# Patient Record
Sex: FEMALE | HISPANIC OR LATINO | Employment: UNEMPLOYED | URBAN - METROPOLITAN AREA
[De-identification: names, ages, dates, MRNs, and addresses within clinical notes are randomized per-mention and may not be internally consistent; named-entity substitution may affect disease eponyms.]

---

## 2017-09-14 DIAGNOSIS — F32.A DEPRESSIVE DISORDER: ICD-10-CM

## 2017-09-14 RX ORDER — CITALOPRAM HYDROBROMIDE 20 MG/1
TABLET ORAL
Qty: 7 TABLET | Refills: 0 | OUTPATIENT
Start: 2017-09-14

## 2017-09-14 NOTE — TELEPHONE ENCOUNTER
CITALOPRAM---LAST SEEN 2015, CANCELLED LAST MEDICATION CHECK APPOINTMENT ON 4/25/16.  PATIENT WAS TOLD NEEDS TO BE SEEN AT LEAST ONCE A YEAR, SOMETIME MORE OFTEN.  TALKED ABOUT THE FACT THAT SHE CANCELLED HER MEDICATION CHECK BACK IN 2016 AND HAS NOT RESCHEDULED AND THE REFILL WAS DENIED ON 8/23/17 WITH REASON NEEDS TO BE SEEN.  SHE STATES THAT PHARMACY NEVER TOLD HER.

## 2017-09-15 RX ORDER — CITALOPRAM HYDROBROMIDE 20 MG/1
TABLET ORAL
Qty: 7 TABLET | Refills: 0 | COMMUNITY
Start: 2017-09-15 | End: 2017-09-20

## 2017-09-15 NOTE — TELEPHONE ENCOUNTER
Ok, per Dr. Heart.  Called in #7 of the citalopram.  Patient has upcoming appointment on 9/20/17.  Left on pharmacy voice mail.    Wilfred Ford,   [facility]  [phone]

## 2017-09-20 ENCOUNTER — OFFICE VISIT (OUTPATIENT)
Dept: FAMILY MEDICINE | Facility: CLINIC | Age: 27
End: 2017-09-20

## 2017-09-20 VITALS
DIASTOLIC BLOOD PRESSURE: 74 MMHG | HEIGHT: 62 IN | HEART RATE: 88 BPM | OXYGEN SATURATION: 99 % | BODY MASS INDEX: 31.69 KG/M2 | SYSTOLIC BLOOD PRESSURE: 108 MMHG | WEIGHT: 172.2 LBS

## 2017-09-20 DIAGNOSIS — F33.42 RECURRENT MAJOR DEPRESSIVE DISORDER, IN FULL REMISSION (H): ICD-10-CM

## 2017-09-20 DIAGNOSIS — E66.09 NON MORBID OBESITY DUE TO EXCESS CALORIES: ICD-10-CM

## 2017-09-20 DIAGNOSIS — Z23 NEED FOR TD VACCINE: Primary | ICD-10-CM

## 2017-09-20 LAB
% GRANULOCYTES: 64.4 % (ref 42.2–75.2)
HCT VFR BLD AUTO: 43.5 % (ref 35–46)
HEMOGLOBIN: 14.6 G/DL (ref 11.8–15.5)
LYMPHOCYTES NFR BLD AUTO: 27.7 % (ref 20.5–51.1)
MCH RBC QN AUTO: 29.1 PG (ref 27–31)
MCHC RBC AUTO-ENTMCNC: 33.6 G/DL (ref 33–37)
MCV RBC AUTO: 86.7 FL (ref 80–100)
MONOCYTES NFR BLD AUTO: 7.9 % (ref 1.7–9.3)
PLATELET # BLD AUTO: 305 K/UL (ref 140–450)
RBC # BLD AUTO: 5.02 X10/CMM (ref 3.7–5.2)
WBC # BLD AUTO: 9.4 X10/CMM (ref 3.8–11)

## 2017-09-20 PROCEDURE — 82306 VITAMIN D 25 HYDROXY: CPT | Mod: 90 | Performed by: FAMILY MEDICINE

## 2017-09-20 PROCEDURE — 82947 ASSAY GLUCOSE BLOOD QUANT: CPT | Mod: 90 | Performed by: FAMILY MEDICINE

## 2017-09-20 PROCEDURE — 80061 LIPID PANEL: CPT | Mod: 90 | Performed by: FAMILY MEDICINE

## 2017-09-20 PROCEDURE — 36415 COLL VENOUS BLD VENIPUNCTURE: CPT | Performed by: FAMILY MEDICINE

## 2017-09-20 PROCEDURE — 99214 OFFICE O/P EST MOD 30 MIN: CPT | Performed by: FAMILY MEDICINE

## 2017-09-20 PROCEDURE — 85025 COMPLETE CBC W/AUTO DIFF WBC: CPT | Performed by: FAMILY MEDICINE

## 2017-09-20 RX ORDER — TESTOSTERONE GEL, 1% 10 MG/G
4 GEL TRANSDERMAL DAILY
Qty: 150 G | Refills: 5 | Status: CANCELLED | OUTPATIENT
Start: 2017-09-20

## 2017-09-20 RX ORDER — CITALOPRAM HYDROBROMIDE 20 MG/1
TABLET ORAL
Qty: 90 TABLET | Refills: 3 | Status: SHIPPED | OUTPATIENT
Start: 2017-09-20 | End: 2018-11-08

## 2017-09-20 ASSESSMENT — PATIENT HEALTH QUESTIONNAIRE - PHQ9: SUM OF ALL RESPONSES TO PHQ QUESTIONS 1-9: 3

## 2017-09-20 NOTE — MR AVS SNAPSHOT
After Visit Summary   9/20/2017    Rosemary Davis    MRN: [id number]           Patient Information     Date Of Birth          1990        Visit Information        Provider Department      9/20/2017 1:00 PM Pamela Heart MD [facility]        Today's Diagnoses     Need for TD vaccine    -  1    Recurrent major depressive disorder, in full remission (H)        Non morbid obesity due to excess calories           Follow-ups after your visit        Additional Services     NUTRITION REFERRAL       Your provider has referred you to: [facility]: [facility] (on call location)  Valerie [phone]   [email or web address]    Please be aware that coverage of these services is subject to the terms and limitations of your health insurance plan.  Call member services at your health plan with any benefit or coverage questions.      Please bring the following with you to your appointment:    (1) This referral request  (2) Any documents given to you regarding this referral  (3) Any specific questions you have about diet and/or food choices                  Who to contact     If you have questions or need follow up information about today's clinic visit or your schedule please contact [facility] directly at [phone].  Normal or non-critical lab and imaging results will be communicated to you by MyChart, letter or phone within 4 business days after the clinic has received the results. If you do not hear from us within 7 days, please contact the clinic through MyChart or phone. If you have a critical or abnormal lab result, we will notify you by phone as soon as possible.  Submit refill requests through [employer] or call your pharmacy and they will forward the refill request to us. Please allow 3 business days for your refill to be completed.          Additional Information About Your Visit        MyChart Information     [employer] lets you send  "messages to your doctor, view your test results, renew your prescriptions, schedule appointments and more. To sign up, go to [email or web address] . Click on \"Log in\" on the left side of the screen, which will take you to the Welcome page. Then click on \"Sign up Now\" on the right side of the page.     You will be asked to enter the access code listed below, as well as some personal information. Please follow the directions to create your username and password.     Your access code is: [email or web address]  Expires: 2017 11:15 PM     Your access code will  in 90 days. If you need help or a new code, please call your [facility] clinic or [phone].        Care EveryWhere ID     This is your Care EveryWhere ID. This could be used by other organizations to access your [facility] medical records  [id number]        Your Vitals Were     Pulse Height Last Period Pulse Oximetry BMI (Body Mass Index)       88 1.575 m (5' 2\") 2017 (Approximate) 99% 31.5 kg/m2        Blood Pressure from Last 3 Encounters:   17 108/74   10/27/15 94/58   10/20/15 112/74    Weight from Last 3 Encounters:   17 78.1 kg (172 lb 3.2 oz)   10/27/15 76.5 kg (168 lb 9.6 oz)   10/20/15 76.8 kg (169 lb 6.4 oz)              We Performed the Following     CBC with Diff/Plt ([facility])     Glucose  Serum ([facility])     Lipid Panel ([facility])     NUTRITION REFERRAL     Vitamin D  25-Hydroxy ([facility])          Where to get your medicines      These medications were sent to [place] [address] [place] - [facility] [address]  [address] [facility], [facility] [address]     Phone:  [phone]     citalopram 20 MG tablet          Primary Care Provider Office Phone # Fax #    Pamela Devika Heart [phone]       [facility] [address]  [facility] [address]        Equal Access to Services     JODIE HIRSCH AH: Perfecto Ryan, nikos ko, houston rodriguez, chip miller" doug hopkins ah. So [facility] [phone].    ATENCIÓN: Si alejandra boyd, tiene a mathew disposición servicios gratuitos de asistencia lingüística. Abigail al [phone].    We comply with applicable federal civil rights laws and [place] laws. We do not discriminate on the basis of race, color, national origin, age, disability sex, sexual orientation or gender identity.            Thank you!     Thank you for choosing [facility]  for your care. Our goal is always to provide you with excellent care. Hearing back from our patients is one way we can continue to improve our services. Please take a few minutes to complete the written survey that you may receive in the mail after your visit with us. Thank you!             Your Updated Medication List - Protect others around you: Learn how to safely use, store and throw away your medicines at www.disposemymeds.org.          This list is accurate as of: 9/20/17 11:15 PM.  Always use your most recent med list.                   Brand Name Dispense Instructions for use Diagnosis    acetaminophen 325 MG tablet    TYLENOL    250 tablet    Take 2 tablets by mouth every 4 hours as needed.    Pain, Supracondylar fracture of humerus, closed       citalopram 20 MG tablet    celeXA    90 tablet    TAKE ONE TAB BY MOUTH ONCE DAILY    Recurrent major depressive disorder, in full remission (H)       ibuprofen 200 MG capsule     100 capsule    Take 400 mg by mouth every 4 hours as needed for fever.    Supracondylar fracture of humerus, closed, Nonunion of fracture, Pain

## 2017-09-20 NOTE — PROGRESS NOTES
"Problem(s) Oriented visit        SUBJECTIVE:                                                    Rosemary Davis is a 27 year old female who presents to clinic today for recheck of moods. She has depression and takes citalopram 20 mg daily. Her sleep has improved over time. She is not and has not ever been suicidal. No family history of depression known. On occasion when she has run out of citalopram she feels sahu and lightheaded. At this time she would like to keep the dose the same. No tobacco, rare alcohol. No drugs.       Problem list, Medication list, Allergies, and Medical/Social/Surgical histories [facility] and updated as appropriate.   Additional history: as documented    ROS:  5 point ROS completed and negative except noted above, including Gen, CV, Resp, GI, MS      Histories:   Patient Active Problem List   Diagnosis     Nonunion of fracture     Ataxia     Depressive disorder     Missed      Humerus fracture     Health Care Home     Past Surgical History:   Procedure Laterality Date     DENTAL SURGERY      wisdom extraction      EYE SURGERY      muscle repair for wandering eye--bilat     OPEN REDUCTION INTERNAL FIXATION ELBOW  2012    Procedure...:OPEN REDUCTION INTERNAL FIXATION ELBOW; REPAIR OF NON-UNION, LEFT ELBOW ; Surgeon:BREE NAVARRETE; Location: OR     OPEN REDUCTION INTERNAL FIXATION HUMERUS DISTAL  2012    Procedure:OPEN REDUCTION INTERNAL FIXATION HUMERUS DISTAL; ORIF LEFT DISTAL HUMERUS FRACTURE. SYNTHES ELBOW PLATES, MINI C-ARM; Surgeon:BREE NAVARRETE; Location: OR       Social History   Substance Use Topics     Smoking status: Never Smoker     Smokeless tobacco: Never Used      Comment: socially only      Alcohol use No     Family History   Problem Relation Age of Onset     Glaucoma No family hx of      Macular Degeneration No family hx of            OBJECTIVE:                                                    /74  Pulse 88  Ht 1.575 m (5' 2\")  " Wt 78.1 kg (172 lb 3.2 oz)  LMP 08/17/2017 (Approximate)  SpO2 99%  BMI 31.5 kg/m2  Body mass index is 31.5 kg/(m^2).   GENERAL APPEARANCE: Alert, no acute distress  NECK: No adenopathy,masses or thyromegaly  RESP: lungs clear to auscultation   CV: normal rate, regular rhythm, no murmur or gallop  ABDOMEN: soft, no organomegaly, masses or tenderness  LYMPHATICS: No cervical, supraclavicular or inguinal adenopathy  MS: extremities normal, no peripheral edema  NEURO: Alert, oriented, speech and mentation normal  PSYCH: mentation appears normal, affect and mood normal   Labs Resulted Today:   Results for orders placed or performed in visit on 09/20/17   CBC with Diff/Plt (G)   Result Value Ref Range    WBC x10/cmm 9.4 3.8 - 11.0 x10/cmm    % Lymphocytes 27.7 20.5 - 51.1 %    % Monocytes 7.9 1.7 - 9.3 %    % Granulocytes 64.4 42.2 - 75.2 %    RBC x10/cmm 5.02 3.7 - 5.2 x10/cmm    Hemoglobin 14.6 11.8 - 15.5 g/dl    Hematocrit 43.5 35 - 46 %    MCV 86.7 80 - 100 fL    MCH 29.1 27.0 - 31.0 pg    MCHC 33.6 33.0 - 37.0 g/dL    Platelet Count 305 140 - 450 K/uL     ASSESSMENT/PLAN:                                                        Rosemary was seen today for depression, recheck medication and establish care.    Diagnoses and all orders for this visit:    Need for TD vaccine    Depressive disorder  -     citalopram (CELEXA) 20 MG tablet; TAKE ONE TAB BY MOUTH ONCE DAILY  Follow up in one year, sooner if any change in moods. Check Vitamin D level.    Non-Morbid Obesity  Nutrition referral is offered, strongly recommend that she add exercise to her daily routine. Check glucose and lipid panel.      There are no Patient Instructions on file for this visit.    The following health maintenance items are reviewed in Epic and correct as of today:  Health Maintenance   Topic Date Due     TETANUS IMMUNIZATION (SYSTEM ASSIGNED)  06/07/2016     PAP SCREENING Q3 YR (SYSTEM ASSIGNED)  02/13/2018     INFLUENZA VACCINE (SYSTEM  ASSIGNED)  Addressed       Pamela Heart MD  [facility]  [phone]    For any issues my office # is [phone]

## 2017-09-21 LAB
CHOLEST SERPL-MCNC: 230 MG/DL (ref 100–199)
GLUCOSE SERPL-MCNC: 86 MG/DL (ref 65–99)
HDLC SERPL-MCNC: 37 MG/DL
LDL/HDL RATIO: 3.5 RATIO UNITS (ref 0–3.2)
LDLC SERPL CALC-MCNC: 129 MG/DL (ref 0–99)
TRIGL SERPL-MCNC: 320 MG/DL (ref 0–149)
VITAMIN D, 25-HYDROXY: 7.8 NG/ML (ref 30–100)
VLDLC SERPL CALC-MCNC: 64 MG/DL (ref 5–40)

## 2017-09-25 ENCOUNTER — TELEPHONE (OUTPATIENT)
Dept: FAMILY MEDICINE | Facility: CLINIC | Age: 27
End: 2017-09-25

## 2017-09-25 DIAGNOSIS — R79.89 LOW VITAMIN D LEVEL: Primary | ICD-10-CM

## 2017-09-25 DIAGNOSIS — E78.00 ELEVATED CHOLESTEROL: ICD-10-CM

## 2017-09-25 NOTE — TELEPHONE ENCOUNTER
Patient returned phone call for results.  Advised patient add 5000IU daily and RTC in 8 weeks to recheck.  Also discussed elevated cholesterol and following low saturated fat diet and exercise and rechecking in 8 weeks.  Kellie Bourgeois

## 2017-09-25 NOTE — TELEPHONE ENCOUNTER
----- Message from Pamela Heart MD sent at 9/21/2017  4:48 PM CDT -----  Blood counts and glucose are normal.  Vitamin D level is horribly low! Please add Vitamin D3 5,000 IU daily and return in 8 weeks to recheck.  Cholesterol is too high. Please follow low saturated fat diet, add exercise, and recheck in 8 weeks.

## 2017-10-10 ENCOUNTER — HOSPITAL ENCOUNTER (OUTPATIENT)
Dept: NUTRITION | Facility: CLINIC | Age: 27
Discharge: HOME | End: 2017-10-10
Attending: FAMILY MEDICINE | Admitting: FAMILY MEDICINE
Payer: COMMERCIAL

## 2017-10-10 PROCEDURE — 97802 MEDICAL NUTRITION INDIV IN: CPT | Performed by: DIETITIAN, REGISTERED

## 2017-10-11 NOTE — PROGRESS NOTES
"OUTPATIENT NUTRITION ASSESSMENT  REASON FOR ASSESSMENT  Rosemary Davis referred by Dr. Heart for MNT related to non morbid obesity due to excess calories.    Patient accompanied by self      ASSESSMENT   Nutrition History: - Information obtained from patient.  Patient has been trying to make changes since meeting with her physician.  Patient has stopped eating chips, popcorn and pretzels.  Patient is focusing on protein sources and vegetables at her meals.  Patient's  prepares meals and is willing to change his eating habits, too.  Patient states her and her  have gained weight since getting .    Diet Recall:  Breakfast: coffee with cream and sugar (2-3 sugar cubes) + Cheerios; previously ate breakfast sandwich from [employer] or bagel with cream cheese   Lunch: chili; previously ate cheeseburger and french fries or other fast food near her work   Dinner: stew (no potatoes) or spaghetti with noodles made from [employer]; previously ate entire pizza or ordered take out Chinese  Snack: nuts, apple, Kind bar, previously-ice cream on break  Beverages: coffee and water  Dining out: varies         Exercise: Patient walks dog 2-3 times per week (1-1.5 miles).  Patient tracking steps on alon (0587-5367 steps per day).  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     PHYSICAL FINDINGS  Observed:  No nutrition-related physical findings observed  Obtained from Chart/Interdisciplinary Team:  None noted    LABS  Labs reviewed-note elevated cholesterol and triglycerides    MEDICATIONS  Medications reviewed    ANTHROPOMETRICS   Height: 5'2\"  Weight: 170.6 lbs  BMI (kg/m2): 31.1 kg/m2  Weight Status:  Obesity Grade I BMI 30-34.9  %IBW: 155%  Weight History:   Wt Readings from Last 5 Encounters:   09/20/17 78.1 kg (172 lb 3.2 oz)   10/27/15 76.5 kg (168 lb 9.6 oz)   10/20/15 76.8 kg (169 lb 6.4 oz)   04/28/15 73.6 kg (162 lb 3.2 oz)   04/21/14 68.6 kg (151 lb 3.2 oz)     ASSESSED " NUTRITION NEEDS  Estimated Energy Needs: 1440-4040 kcals/day (14-17 Kcal/Kg)  Justification:  (obese)  Estimated Protein Needs: 50-60 grams protein/day (1-1.2 g pro/Kg)  Justification:  (maintenance)  Estimated Fluid Needs: 6507-5648 mL/day (30-35 mL/kg)    ASSESSED MALNUTRITION STATUS  % Weight Loss:  None noted  % Intake:  No decreased intake noted  Subcutaneous Fat Loss:  None observed  Loss of Muscle Mass:  None observed  Fluid Retention:  None noted    Malnutrition Diagnosis:  Patient does not meet two of the above criteria necessary for diagnosing malnutrition    DIAGNOSIS   Nutrition Diagnosis:  Obese, class I related to excess energy intake as evidenced by current weight of 31.1 kg/m2      INTERVENTIONS   Nutrition Prescription   Recommend modified carbohydrate diet for weight loss and lipid reduction      IMPLEMENTATION   Assessed learning needs and learning preference  Teaching Method(s) used: Booklet / Handout  Explanation  Vitamin and Mineral Supplements: per MD  Diet Education:  Provided education on weight loss, carbohydrate counting, Mediterranean diet  Nutrition Education (Content):   a)  Discussed portion sizes, label reading, carbohydrate counting and heart health diet guidelines.  Suggest gradual behavior changes for long term weight loss success.  Congratulated patient in changes she has made in her diet.  Encouraged patient to reduce sugar intake to improve triglyceride levels.  Supported patient in her challenge with weight loss and behavior change.   b)  Provided Academy of Nutrition and Dietetics Count Your Carbs: Getting Started booklet  Nutrition Education (Application):   a)  Discussed current eating plans and recommended alternative food choices.   b)  Patient verbalizes understanding of diet by will continue reducing carbohydrate intake.   Anticipate fair-good compliance     GOALS (Patient determined goal)  Increase exercise to 4 times per week     FOLLOW UP/MONITORING   Progress towards  goals will be monitored and evaluated per protocol and Practice Guidelines  Patient to call if desires follow up appointment or if has questions  RD name and number provided    Time Spent with Patient  45 minutes    Ting Head, RD, LD  [facility] Outpatient Dietitian  [phone] (office phone)

## 2017-10-20 ENCOUNTER — TELEPHONE (OUTPATIENT)
Dept: OPHTHALMOLOGY | Facility: CLINIC | Age: 27
End: 2017-10-20

## 2017-10-20 NOTE — TELEPHONE ENCOUNTER
----- Message from Morales Gunn sent at 10/20/2017  8:57 AM CDT -----  Regarding: Pt Wants Contact Rx Emailed to Her  Contact: [phone]  Pt of Dr Brenner stated that she was mailed a copy of her Rx but it got lost and needs to order new pair. Pt is looking for an email copy of her contact Rx.     Please email if possible to [email or web address], any questions call [phone] and please call to update Pt once it's been completed.    Thank you!  CH  Please DO NOT send this message and/or reply back to sender. Call Center Representatives DO NOT respond to messages.

## 2018-04-15 ENCOUNTER — HEALTH MAINTENANCE LETTER (OUTPATIENT)
Age: 28
End: 2018-04-15

## 2018-04-18 LAB
ABO + RH BLD: NORMAL
ABO + RH BLD: NORMAL
BLD GP AB SCN SERPL QL: NORMAL
HBV SURFACE AG SERPL QL IA: NORMAL
HIV 1+2 AB+HIV1 P24 AG SERPL QL IA: NONREACTIVE
RUBELLA ANTIBODY IGG QUANTITATIVE: NORMAL IU/ML
TREPONEMA ANTIBODIES: NORMAL

## 2018-10-24 LAB — GROUP B STREP PCR: NORMAL

## 2018-10-25 ENCOUNTER — PRE VISIT (OUTPATIENT)
Dept: MATERNAL FETAL MEDICINE | Facility: CLINIC | Age: 28
End: 2018-10-25

## 2018-10-26 ENCOUNTER — HOSPITAL ENCOUNTER (OUTPATIENT)
Dept: ULTRASOUND IMAGING | Facility: CLINIC | Age: 28
Discharge: HOME | End: 2018-10-26
Attending: OBSTETRICS & GYNECOLOGY | Admitting: OBSTETRICS & GYNECOLOGY
Payer: COMMERCIAL

## 2018-10-26 ENCOUNTER — OFFICE VISIT (OUTPATIENT)
Dept: MATERNAL FETAL MEDICINE | Facility: CLINIC | Age: 28
End: 2018-10-26
Attending: OBSTETRICS & GYNECOLOGY
Payer: COMMERCIAL

## 2018-10-26 DIAGNOSIS — O35.9XX0 SUSPECTED FETAL ABNORMALITY AFFECTING MANAGEMENT OF MOTHER, ANTEPARTUM, SINGLE OR UNSPECIFIED FETUS: Primary | ICD-10-CM

## 2018-10-26 DIAGNOSIS — Z03.73 SUSPECTED FETAL ANOMALY NOT FOUND: ICD-10-CM

## 2018-10-26 DIAGNOSIS — O26.90 PREGNANCY RELATED CONDITION, ANTEPARTUM: ICD-10-CM

## 2018-10-26 PROCEDURE — 76811 OB US DETAILED SNGL FETUS: CPT

## 2018-10-26 NOTE — PROGRESS NOTES
Please refer to ultrasound report under 'Imaging' Studies of 'Chart Review' tabs.    Les Mc M.D.

## 2018-10-26 NOTE — MR AVS SNAPSHOT
"              After Visit Summary   10/26/2018    Rosemary Davis    MRN: [id number]           Patient Information     Date Of Birth          1990        Visit Information        Provider Department      10/26/2018 11:30 AM Les Mc MD [facility] Maternal Fetal Medicine [facility]        Today's Diagnoses     Suspected fetal abnormality affecting management of mother, antepartum, single or unspecified fetus    -  1    Suspected fetal anomaly not found           Follow-ups after your visit        Who to contact     If you have questions or need follow up information about today's clinic visit or your schedule please contact [facility] MATERNAL FETAL MEDICINE [facility] directly at [phone].  Normal or non-critical lab and imaging results will be communicated to you by MyChart, letter or phone within 4 business days after the clinic has received the results. If you do not hear from us within 7 days, please contact the clinic through [employer] or phone. If you have a critical or abnormal lab result, we will notify you by phone as soon as possible.  Submit refill requests through [employer] or call your pharmacy and they will forward the refill request to us. Please allow 3 business days for your refill to be completed.          Additional Information About Your Visit        MyChart Information     [employer] lets you send messages to your doctor, view your test results, renew your prescriptions, schedule appointments and more. To sign up, go to [email or web address] . Click on \"Log in\" on the left side of the screen, which will take you to the Welcome page. Then click on \"Sign up Now\" on the right side of the page.     You will be asked to enter the access code listed below, as well as some personal information. Please follow the directions to create your username and password.     Your access code is: [email or web address]  Expires: 2019 11:50 AM     Your access code will  in 90 days. If you need help or " a new code, please call your [place] clinic or [phone].        Care EveryWhere ID     This is your Care EveryWhere ID. This could be used by other organizations to access your [facility] medical records  [id number]         Blood Pressure from Last 3 Encounters:   09/20/17 108/74   10/27/15 94/58   10/20/15 112/74    Weight from Last 3 Encounters:   09/20/17 78.1 kg (172 lb 3.2 oz)   10/27/15 76.5 kg (168 lb 9.6 oz)   10/20/15 76.8 kg (169 lb 6.4 oz)              Today, you had the following     No orders found for display       Primary Care Provider Office Phone # Fax #    Pamela Devika Heart [phone] [address] [place] [address]        Equal Access to Services     [facility]: Hadii aad ku hadasho Soomaali, waaxda luqadaha, qaybta kaalmada adeegyada, waxay cristobalin hayfredi hopkins . So [facility] [phone].    ATENCIÓN: Si habla español, tiene a mathew disposición servicios gratuitos de asistencia lingüística. Llame al [phone].    We comply with applicable federal civil rights laws and [place] laws. We do not discriminate on the basis of race, color, national origin, age, disability, sex, sexual orientation, or gender identity.            Thank you!     Thank you for choosing [facility]  for your care. Our goal is always to provide you with excellent care. Hearing back from our patients is one way we can continue to improve our services. Please take a few minutes to complete the written survey that you may receive in the mail after your visit with us. Thank you!             Your Updated Medication List - Protect others around you: Learn how to safely use, store and throw away your medicines at www.disposemymeds.org.          This list is accurate as of 10/26/18 11:50 AM.  Always use your most recent med list.                   Brand Name Dispense Instructions for use Diagnosis    acetaminophen 325 MG tablet    TYLENOL     250 tablet    Take 2 tablets by mouth every 4 hours as needed.    Pain, Supracondylar fracture of humerus, closed       citalopram 20 MG tablet    celeXA    90 tablet    TAKE ONE TAB BY MOUTH ONCE DAILY    Recurrent major depressive disorder, in full remission (H)       ibuprofen 200 MG capsule     100 capsule    Take 400 mg by mouth every 4 hours as needed for fever.    Supracondylar fracture of humerus, closed, Nonunion of fracture, Pain

## 2018-11-07 DIAGNOSIS — F33.42 RECURRENT MAJOR DEPRESSIVE DISORDER, IN FULL REMISSION (H): ICD-10-CM

## 2018-11-07 RX ORDER — CITALOPRAM HYDROBROMIDE 20 MG/1
TABLET ORAL
Qty: 90 TABLET | Refills: 3 | Status: CANCELLED | OUTPATIENT
Start: 2018-11-07

## 2018-11-07 NOTE — TELEPHONE ENCOUNTER
citalopram---last seen 9/20/17, patient is due for an appointment (I have left a message for the patient to call the clinic)

## 2018-11-08 ENCOUNTER — OFFICE VISIT (OUTPATIENT)
Dept: FAMILY MEDICINE | Facility: CLINIC | Age: 28
End: 2018-11-08

## 2018-11-08 VITALS
OXYGEN SATURATION: 98 % | BODY MASS INDEX: 35.67 KG/M2 | WEIGHT: 195 LBS | SYSTOLIC BLOOD PRESSURE: 122 MMHG | HEART RATE: 96 BPM | RESPIRATION RATE: 18 BRPM | DIASTOLIC BLOOD PRESSURE: 82 MMHG

## 2018-11-08 DIAGNOSIS — F33.42 RECURRENT MAJOR DEPRESSIVE DISORDER, IN FULL REMISSION (H): ICD-10-CM

## 2018-11-08 PROCEDURE — 99213 OFFICE O/P EST LOW 20 MIN: CPT | Performed by: NURSE PRACTITIONER

## 2018-11-08 RX ORDER — CITALOPRAM HYDROBROMIDE 20 MG/1
TABLET ORAL
Qty: 90 TABLET | Refills: 3 | Status: SHIPPED | OUTPATIENT
Start: 2018-11-08 | End: 2019-11-22

## 2018-11-08 ASSESSMENT — ANXIETY QUESTIONNAIRES
7. FEELING AFRAID AS IF SOMETHING AWFUL MIGHT HAPPEN: NOT AT ALL
5. BEING SO RESTLESS THAT IT IS HARD TO SIT STILL: SEVERAL DAYS
6. BECOMING EASILY ANNOYED OR IRRITABLE: NOT AT ALL
2. NOT BEING ABLE TO STOP OR CONTROL WORRYING: SEVERAL DAYS
GAD7 TOTAL SCORE: 5
IF YOU CHECKED OFF ANY PROBLEMS ON THIS QUESTIONNAIRE, HOW DIFFICULT HAVE THESE PROBLEMS MADE IT FOR YOU TO DO YOUR WORK, TAKE CARE OF THINGS AT HOME, OR GET ALONG WITH OTHER PEOPLE: SOMEWHAT DIFFICULT
3. WORRYING TOO MUCH ABOUT DIFFERENT THINGS: SEVERAL DAYS
1. FEELING NERVOUS, ANXIOUS, OR ON EDGE: SEVERAL DAYS

## 2018-11-08 ASSESSMENT — PATIENT HEALTH QUESTIONNAIRE - PHQ9
SUM OF ALL RESPONSES TO PHQ QUESTIONS 1-9: 4
5. POOR APPETITE OR OVEREATING: SEVERAL DAYS

## 2018-11-08 NOTE — MR AVS SNAPSHOT
"              After Visit Summary   2018    Rosemary Davis    MRN: [id number]           Patient Information     Date Of Birth          1990        Visit Information        Provider Department      2018 11:00 AM Albina Cano APRN CNP [facility]        Today's Diagnoses     Recurrent major depressive disorder, in full remission (H)           Follow-ups after your visit        Follow-up notes from your care team     Return if symptoms worsen or fail to improve.      Who to contact     If you have questions or need follow up information about today's clinic visit or your schedule please contact [facility] directly at [phone].  Normal or non-critical lab and imaging results will be communicated to you by Digital Labhart, letter or phone within 4 business days after the clinic has received the results. If you do not hear from us within 7 days, please contact the clinic through Digital Labhart or phone. If you have a critical or abnormal lab result, we will notify you by phone as soon as possible.  Submit refill requests through SchoolOut or call your pharmacy and they will forward the refill request to us. Please allow 3 business days for your refill to be completed.          Additional Information About Your Visit        MyChart Information     SchoolOut lets you send messages to your doctor, view your test results, renew your prescriptions, schedule appointments and more. To sign up, go to [email or web address] . Click on \"Log in\" on the left side of the screen, which will take you to the Welcome page. Then click on \"Sign up Now\" on the right side of the page.     You will be asked to enter the access code listed below, as well as some personal information. Please follow the directions to create your username and password.     Your access code is: [email or web address]  Expires: 2019 10:50 AM     Your access code will  in 90 days. If you need help or a new code, please call " your [place] clinic or [phone].        Care EveryWhere ID     This is your Care EveryWhere ID. This could be used by other organizations to access your [place] medical records  [id number]        Your Vitals Were     Pulse Respirations Pulse Oximetry BMI (Body Mass Index)          96 18 98% 35.67 kg/m2         Blood Pressure from Last 3 Encounters:   11/08/18 122/82   09/20/17 108/74   10/27/15 94/58    Weight from Last 3 Encounters:   11/08/18 88.5 kg (195 lb)   09/20/17 78.1 kg (172 lb 3.2 oz)   10/27/15 76.5 kg (168 lb 9.6 oz)              Today, you had the following     No orders found for display         Where to get your medicines      These medications were sent to [place] [address] [place] [address]  [address] [place] [address]     Phone:  [phone]     citalopram 20 MG tablet          Primary Care Provider Office Phone # Fax #    [facility] [phone] [address] [place] [address]        Equal Access to Services     JODIE HIRSCH AH: Hadii aad ku hadasho Soomaali, waaxda luqadaha, qaybta kaalmada adeegyada, chip brown haybriann paul clarke. So [facility] [phone].    ATENCIÓN: Si habla español, tiene a mathew disposición servicios gratuitos de asistencia lingüística. [facility] [phone].    We comply with applicable federal civil rights laws and [place] laws. We do not discriminate on the basis of race, color, national origin, age, disability, sex, sexual orientation, or gender identity.            Thank you!     Thank you for choosing [facility]  for your care. Our goal is always to provide you with excellent care. Hearing back from our patients is one way we can continue to improve our services. Please take a few minutes to complete the written survey that you may receive in the mail after your visit with us. Thank you!             Your Updated Medication List - Protect others around you:  Learn how to safely use, store and throw away your medicines at www.disposemymeds.org.          This list is accurate as of 11/8/18 12:28 PM.  Always use your most recent med list.                   Brand Name Dispense Instructions for use Diagnosis    acetaminophen 325 MG tablet    TYLENOL    250 tablet    Take 2 tablets by mouth every 4 hours as needed.    Pain, Supracondylar fracture of humerus, closed       citalopram 20 MG tablet    celeXA    90 tablet    TAKE ONE TAB BY MOUTH ONCE DAILY    Recurrent major depressive disorder, in full remission (H)       ibuprofen 200 MG capsule     100 capsule    Take 400 mg by mouth every 4 hours as needed for fever.    Supracondylar fracture of humerus, closed, Nonunion of fracture, Pain

## 2018-11-08 NOTE — PROGRESS NOTES
SUBJECTIVE:   Rosemary Davis is a 28 year old female who presents to clinic today for the following health issues: Needing refill on celexa. Pregnant and due next week. Moods doing well. Happy with Dose for depression, some anxiety symptoms last couple years. Will let us know if needing help with that after delievery.      Medication Followup of Citalopram    Taking Medication as prescribed: yes    Side Effects:  None    Medication Helping Symptoms:  yes         Problem list and histories reviewed & adjusted, as indicated.  Additional history: as documented    Patient Active Problem List   Diagnosis     Nonunion of fracture     Ataxia     Depressive disorder     Missed      Humerus fracture     Health Care Home     Recurrent major depressive disorder, in full remission (H)     Past Surgical History:   Procedure Laterality Date     DENTAL SURGERY      wisdom extraction      EYE SURGERY      muscle repair for wandering eye--bilat     OPEN REDUCTION INTERNAL FIXATION ELBOW  2012    Procedure...:OPEN REDUCTION INTERNAL FIXATION ELBOW; REPAIR OF NON-UNION, LEFT ELBOW ; Surgeon:BREE NAVARRETE; Location: OR     OPEN REDUCTION INTERNAL FIXATION HUMERUS DISTAL  2012    Procedure:OPEN REDUCTION INTERNAL FIXATION HUMERUS DISTAL; ORIF LEFT DISTAL HUMERUS FRACTURE. SYNTHES ELBOW PLATES, MINI C-ARM; Surgeon:BREE NAVARRETE; Location: OR       Social History   Substance Use Topics     Smoking status: Never Smoker     Smokeless tobacco: Never Used      Comment: socially only      Alcohol use No     Family History   Problem Relation Age of Onset     Glaucoma No family hx of      Macular Degeneration No family hx of            Reviewed and updated as needed this visit by clinical staff  Allergies  Meds       Reviewed and updated as needed this visit by Provider         ROS:  Constitutional, HEENT, cardiovascular, pulmonary, gi and gu systems are negative, except as otherwise noted.    OBJECTIVE:      /82  Pulse 96  Resp 18  Wt 88.5 kg (195 lb)  SpO2 98%  BMI 35.67 kg/m2  Body mass index is 35.67 kg/(m^2).  GENERAL: healthy, alert and no distress  MS: no gross musculoskeletal defects noted, no edema  SKIN: no suspicious lesions or rashes  PSYCH: mentation appears normal, affect normal/bright    Diagnostic Test Results:  none     ASSESSMENT/PLAN:       1. Recurrent major depressive disorder, in full remission (H)  - citalopram (CELEXA) 20 MG tablet; TAKE ONE TAB BY MOUTH ONCE DAILY  Dispense: 90 tablet; Refill: 3    FUTURE APPOINTMENTS:       - Follow-up for annual visit or as needed    AMELIE Ware [facility]

## 2018-11-09 ASSESSMENT — ANXIETY QUESTIONNAIRES: GAD7 TOTAL SCORE: 5

## 2018-11-13 ENCOUNTER — ANESTHESIA EVENT (OUTPATIENT)
Dept: OBGYN | Facility: CLINIC | Age: 28
End: 2018-11-13
Payer: COMMERCIAL

## 2018-11-13 ENCOUNTER — HOSPITAL ENCOUNTER (INPATIENT)
Facility: CLINIC | Age: 28
LOS: 4 days | Discharge: HOME HEALTH | End: 2018-11-17
Attending: OBSTETRICS & GYNECOLOGY | Admitting: OBSTETRICS & GYNECOLOGY
Payer: COMMERCIAL

## 2018-11-13 ENCOUNTER — ANESTHESIA (OUTPATIENT)
Dept: OBGYN | Facility: CLINIC | Age: 28
End: 2018-11-13
Payer: COMMERCIAL

## 2018-11-13 DIAGNOSIS — Z98.891 S/P C-SECTION: Primary | ICD-10-CM

## 2018-11-13 LAB
ABO + RH BLD: NORMAL
ABO + RH BLD: NORMAL
BASOPHILS # BLD AUTO: 0.1 10E9/L (ref 0–0.2)
BASOPHILS NFR BLD AUTO: 0.6 %
BLD GP AB SCN SERPL QL: NORMAL
BLOOD BANK CMNT PATIENT-IMP: NORMAL
DIFFERENTIAL METHOD BLD: ABNORMAL
EOSINOPHIL # BLD AUTO: 0.2 10E9/L (ref 0–0.7)
EOSINOPHIL NFR BLD AUTO: 2.5 %
ERYTHROCYTE [DISTWIDTH] IN BLOOD BY AUTOMATED COUNT: 14.1 % (ref 10–15)
HCT VFR BLD AUTO: 35.5 % (ref 35–47)
HGB BLD-MCNC: 11.5 G/DL (ref 11.7–15.7)
IMM GRANULOCYTES # BLD: 0 10E9/L (ref 0–0.4)
IMM GRANULOCYTES NFR BLD: 0.3 %
LYMPHOCYTES # BLD AUTO: 2.1 10E9/L (ref 0.8–5.3)
LYMPHOCYTES NFR BLD AUTO: 26 %
MCH RBC QN AUTO: 26.5 PG (ref 26.5–33)
MCHC RBC AUTO-ENTMCNC: 32.4 G/DL (ref 31.5–36.5)
MCV RBC AUTO: 82 FL (ref 78–100)
MONOCYTES # BLD AUTO: 0.4 10E9/L (ref 0–1.3)
MONOCYTES NFR BLD AUTO: 5.4 %
NEUTROPHILS # BLD AUTO: 5.2 10E9/L (ref 1.6–8.3)
NEUTROPHILS NFR BLD AUTO: 65.2 %
NRBC # BLD AUTO: 0 10*3/UL
NRBC BLD AUTO-RTO: 0 /100
PLATELET # BLD AUTO: 299 10E9/L (ref 150–450)
RBC # BLD AUTO: 4.34 10E12/L (ref 3.8–5.2)
SPECIMEN EXP DATE BLD: NORMAL
T PALLIDUM AB SER QL: NONREACTIVE
WBC # BLD AUTO: 8 10E9/L (ref 4–11)

## 2018-11-13 PROCEDURE — 12000031 ZZH R&B OB CRITICAL

## 2018-11-13 PROCEDURE — 86900 BLOOD TYPING SEROLOGIC ABO: CPT | Performed by: OBSTETRICS & GYNECOLOGY

## 2018-11-13 PROCEDURE — 3E033VJ INTRODUCTION OF OTHER HORMONE INTO PERIPHERAL VEIN, PERCUTANEOUS APPROACH: ICD-10-PCS | Performed by: OBSTETRICS & GYNECOLOGY

## 2018-11-13 PROCEDURE — 25000128 H RX IP 250 OP 636: Performed by: OBSTETRICS & GYNECOLOGY

## 2018-11-13 PROCEDURE — 86901 BLOOD TYPING SEROLOGIC RH(D): CPT | Performed by: OBSTETRICS & GYNECOLOGY

## 2018-11-13 PROCEDURE — 27110038 ZZH RX 271: Performed by: ANESTHESIOLOGY

## 2018-11-13 PROCEDURE — 85025 COMPLETE CBC W/AUTO DIFF WBC: CPT | Performed by: OBSTETRICS & GYNECOLOGY

## 2018-11-13 PROCEDURE — 25000128 H RX IP 250 OP 636: Performed by: ANESTHESIOLOGY

## 2018-11-13 PROCEDURE — 10907ZC DRAINAGE OF AMNIOTIC FLUID, THERAPEUTIC FROM PRODUCTS OF CONCEPTION, VIA NATURAL OR ARTIFICIAL OPENING: ICD-10-PCS | Performed by: OBSTETRICS & GYNECOLOGY

## 2018-11-13 PROCEDURE — 37000011 ZZH ANESTHESIA WARD SERVICE

## 2018-11-13 PROCEDURE — 25000125 ZZHC RX 250: Performed by: OBSTETRICS & GYNECOLOGY

## 2018-11-13 PROCEDURE — 36415 COLL VENOUS BLD VENIPUNCTURE: CPT | Performed by: OBSTETRICS & GYNECOLOGY

## 2018-11-13 PROCEDURE — 86850 RBC ANTIBODY SCREEN: CPT | Performed by: OBSTETRICS & GYNECOLOGY

## 2018-11-13 PROCEDURE — 25000125 ZZHC RX 250: Performed by: ANESTHESIOLOGY

## 2018-11-13 PROCEDURE — 86780 TREPONEMA PALLIDUM: CPT | Performed by: OBSTETRICS & GYNECOLOGY

## 2018-11-13 RX ORDER — ROPIVACAINE HYDROCHLORIDE 2 MG/ML
10 INJECTION, SOLUTION EPIDURAL; INFILTRATION; PERINEURAL ONCE
Status: COMPLETED | OUTPATIENT
Start: 2018-11-13 | End: 2018-11-13

## 2018-11-13 RX ORDER — METHYLERGONOVINE MALEATE 0.2 MG/ML
200 INJECTION INTRAVENOUS
Status: DISCONTINUED | OUTPATIENT
Start: 2018-11-13 | End: 2018-11-14

## 2018-11-13 RX ORDER — LIDOCAINE 40 MG/G
CREAM TOPICAL
Status: DISCONTINUED | OUTPATIENT
Start: 2018-11-13 | End: 2018-11-14

## 2018-11-13 RX ORDER — OXYTOCIN/0.9 % SODIUM CHLORIDE 30/500 ML
1-24 PLASTIC BAG, INJECTION (ML) INTRAVENOUS CONTINUOUS
Status: DISCONTINUED | OUTPATIENT
Start: 2018-11-13 | End: 2018-11-14

## 2018-11-13 RX ORDER — CARBOPROST TROMETHAMINE 250 UG/ML
250 INJECTION, SOLUTION INTRAMUSCULAR
Status: DISCONTINUED | OUTPATIENT
Start: 2018-11-13 | End: 2018-11-14

## 2018-11-13 RX ORDER — PRENATAL VIT/IRON FUM/FOLIC AC 27MG-0.8MG
1 TABLET ORAL DAILY
COMMUNITY
End: 2020-12-30

## 2018-11-13 RX ORDER — ONDANSETRON 2 MG/ML
4 INJECTION INTRAMUSCULAR; INTRAVENOUS EVERY 6 HOURS PRN
Status: DISCONTINUED | OUTPATIENT
Start: 2018-11-13 | End: 2018-11-14

## 2018-11-13 RX ORDER — LIDOCAINE HYDROCHLORIDE AND EPINEPHRINE 15; 5 MG/ML; UG/ML
INJECTION, SOLUTION EPIDURAL PRN
Status: DISCONTINUED | OUTPATIENT
Start: 2018-11-13 | End: 2018-12-16 | Stop reason: HOSPADM

## 2018-11-13 RX ORDER — OXYTOCIN 10 [USP'U]/ML
10 INJECTION, SOLUTION INTRAMUSCULAR; INTRAVENOUS
Status: DISCONTINUED | OUTPATIENT
Start: 2018-11-13 | End: 2018-11-14

## 2018-11-13 RX ORDER — EPHEDRINE SULFATE 50 MG/ML
5 INJECTION, SOLUTION INTRAMUSCULAR; INTRAVENOUS; SUBCUTANEOUS
Status: DISCONTINUED | OUTPATIENT
Start: 2018-11-13 | End: 2018-11-17 | Stop reason: HOSPADM

## 2018-11-13 RX ORDER — FENTANYL CITRATE 50 UG/ML
100 INJECTION, SOLUTION INTRAMUSCULAR; INTRAVENOUS ONCE
Status: COMPLETED | OUTPATIENT
Start: 2018-11-13 | End: 2018-11-14

## 2018-11-13 RX ORDER — SODIUM CHLORIDE, SODIUM LACTATE, POTASSIUM CHLORIDE, CALCIUM CHLORIDE 600; 310; 30; 20 MG/100ML; MG/100ML; MG/100ML; MG/100ML
INJECTION, SOLUTION INTRAVENOUS CONTINUOUS
Status: DISCONTINUED | OUTPATIENT
Start: 2018-11-13 | End: 2018-11-14

## 2018-11-13 RX ORDER — OXYTOCIN/0.9 % SODIUM CHLORIDE 30/500 ML
100-340 PLASTIC BAG, INJECTION (ML) INTRAVENOUS CONTINUOUS PRN
Status: DISCONTINUED | OUTPATIENT
Start: 2018-11-13 | End: 2018-11-14

## 2018-11-13 RX ORDER — ACETAMINOPHEN 325 MG/1
650 TABLET ORAL EVERY 4 HOURS PRN
Status: DISCONTINUED | OUTPATIENT
Start: 2018-11-13 | End: 2018-11-14

## 2018-11-13 RX ORDER — FENTANYL CITRATE 50 UG/ML
50-100 INJECTION, SOLUTION INTRAMUSCULAR; INTRAVENOUS
Status: DISCONTINUED | OUTPATIENT
Start: 2018-11-13 | End: 2018-11-14

## 2018-11-13 RX ORDER — NALBUPHINE HYDROCHLORIDE 10 MG/ML
2.5-5 INJECTION, SOLUTION INTRAMUSCULAR; INTRAVENOUS; SUBCUTANEOUS EVERY 6 HOURS PRN
Status: DISCONTINUED | OUTPATIENT
Start: 2018-11-13 | End: 2018-11-14

## 2018-11-13 RX ORDER — IBUPROFEN 400 MG/1
800 TABLET, FILM COATED ORAL
Status: DISCONTINUED | OUTPATIENT
Start: 2018-11-13 | End: 2018-11-14

## 2018-11-13 RX ORDER — VANCOMYCIN HYDROCHLORIDE 1 G/200ML
1000 INJECTION, SOLUTION INTRAVENOUS EVERY 12 HOURS
Status: DISCONTINUED | OUTPATIENT
Start: 2018-11-13 | End: 2018-11-14

## 2018-11-13 RX ORDER — OXYCODONE AND ACETAMINOPHEN 5; 325 MG/1; MG/1
1 TABLET ORAL
Status: DISCONTINUED | OUTPATIENT
Start: 2018-11-13 | End: 2018-11-14

## 2018-11-13 RX ORDER — NALOXONE HYDROCHLORIDE 0.4 MG/ML
.1-.4 INJECTION, SOLUTION INTRAMUSCULAR; INTRAVENOUS; SUBCUTANEOUS
Status: DISCONTINUED | OUTPATIENT
Start: 2018-11-13 | End: 2018-11-14

## 2018-11-13 RX ADMIN — OXYTOCIN-SODIUM CHLORIDE 0.9% IV SOLN 30 UNIT/500ML 2 MILLI-UNITS/MIN: 30-0.9/5 SOLUTION at 08:26

## 2018-11-13 RX ADMIN — SODIUM CHLORIDE, POTASSIUM CHLORIDE, SODIUM LACTATE AND CALCIUM CHLORIDE: 600; 310; 30; 20 INJECTION, SOLUTION INTRAVENOUS at 19:27

## 2018-11-13 RX ADMIN — Medication 10 ML/HR: at 19:08

## 2018-11-13 RX ADMIN — VANCOMYCIN HYDROCHLORIDE 1000 MG: 1 INJECTION, SOLUTION INTRAVENOUS at 08:25

## 2018-11-13 RX ADMIN — SODIUM CHLORIDE, POTASSIUM CHLORIDE, SODIUM LACTATE AND CALCIUM CHLORIDE: 600; 310; 30; 20 INJECTION, SOLUTION INTRAVENOUS at 17:11

## 2018-11-13 RX ADMIN — FENTANYL CITRATE 100 MCG: 50 INJECTION, SOLUTION INTRAMUSCULAR; INTRAVENOUS at 18:57

## 2018-11-13 RX ADMIN — ROPIVACAINE HYDROCHLORIDE 10 ML: 2 INJECTION, SOLUTION EPIDURAL; INFILTRATION at 18:57

## 2018-11-13 RX ADMIN — SODIUM CHLORIDE, POTASSIUM CHLORIDE, SODIUM LACTATE AND CALCIUM CHLORIDE: 600; 310; 30; 20 INJECTION, SOLUTION INTRAVENOUS at 08:15

## 2018-11-13 RX ADMIN — VANCOMYCIN HYDROCHLORIDE 1000 MG: 1 INJECTION, SOLUTION INTRAVENOUS at 20:48

## 2018-11-13 RX ADMIN — SODIUM CHLORIDE, POTASSIUM CHLORIDE, SODIUM LACTATE AND CALCIUM CHLORIDE 1000 ML: 600; 310; 30; 20 INJECTION, SOLUTION INTRAVENOUS at 18:11

## 2018-11-13 RX ADMIN — LIDOCAINE HYDROCHLORIDE,EPINEPHRINE BITARTRATE 3 ML: 15; .005 INJECTION, SOLUTION EPIDURAL; INFILTRATION; INTRACAUDAL; PERINEURAL at 18:54

## 2018-11-13 ASSESSMENT — ACTIVITIES OF DAILY LIVING (ADL)
RETIRED_COMMUNICATION: 0-->UNDERSTANDS/COMMUNICATES WITHOUT DIFFICULTY
SWALLOWING: 0-->SWALLOWS FOODS/LIQUIDS WITHOUT DIFFICULTY
TRANSFERRING: 0-->INDEPENDENT
COGNITION: 0 - NO COGNITION ISSUES REPORTED
FALL_HISTORY_WITHIN_LAST_SIX_MONTHS: NO
BATHING: 0-->INDEPENDENT
AMBULATION: 0-->INDEPENDENT
TOILETING: 0-->INDEPENDENT
DRESS: 0-->INDEPENDENT
RETIRED_EATING: 0-->INDEPENDENT

## 2018-11-13 NOTE — IP AVS SNAPSHOT
[address]    [place] [address]    Phone:  [phone]                                       After Visit Summary   11/13/2018    Rosemary Davis    MRN: [id number]           After Visit Summary Signature Page     I have received my discharge instructions, and my questions have been answered. I have discussed any challenges I see with this plan with the nurse or doctor.    ..........................................................................................................................................  Patient/Patient Representative Signature      ..........................................................................................................................................  Patient Representative Print Name and Relationship to Patient    ..................................................               ................................................  Date                                   Time    ..........................................................................................................................................  Reviewed by Signature/Title    ...................................................              ..............................................  Date                                               Time          22EPIC Rev 08/18

## 2018-11-13 NOTE — H&P
2018    Rosemary Davis  [id number]            OB Admit History & Physical  CC: induction of labor    HPI:  at 39w5d here for IOL for elective indications with adequate bishops score.  Pt pregnancy has over all been uncomplicated.  She was seen by MFM two weeks ago for potential new onset umbilical vein varix but r/o by Level II ultrasound. Pt declined fetal genetic screening    Estimated fetal weight= 8 lbs    GBS positive with hives to PCN.  Plan vancomycin    Pt has a PMH of eye and balance issues.  Will need help with ambulation.     She is a 28 year old   Her OB history:   Obstetric History       T0      L0     SAB1   TAB0   Ectopic0   Multiple0   Live Births0       # Outcome Date GA Lbr Jerry/2nd Weight Sex Delivery Anes PTL Lv   2 Current            1 SAB                        Past Medical History:   Diagnosis Date     Ataxia      Depressive disorder      Humerus fracture initial injury  2012    ORIF left humerus in 2012     Missed            Past Surgical History:   Procedure Laterality Date     DENTAL SURGERY      wisdom extraction      EYE SURGERY      muscle repair for wandering eye--bilat     OPEN REDUCTION INTERNAL FIXATION ELBOW  2012    Procedure...:OPEN REDUCTION INTERNAL FIXATION ELBOW; REPAIR OF NON-UNION, LEFT ELBOW ; Surgeon:BREE NAVARRETE; Location: OR     OPEN REDUCTION INTERNAL FIXATION HUMERUS DISTAL  2012    Procedure:OPEN REDUCTION INTERNAL FIXATION HUMERUS DISTAL; ORIF LEFT DISTAL HUMERUS FRACTURE. SYNTHES ELBOW PLATES, MINI C-ARM; Surgeon:BREE NAVARRETE; Location: OR         No current outpatient prescriptions on file.       Allergies: Sulfa drugs and Amoxicillin      REVIEW OF SYSTEMS:  NEUROLOGIC:  Negative  EYES:  Negative  ENT:  Negative  GI:  Negative  BREAST:  Negative  :  Negative  GYN:  Negative  CV:  Negative  PULMONARY:  Negative  MUSCULOSKELETAL:  Negative  PSYCH:  Negative        Social History      Social History     Marital status:      Spouse name: N/A     Number of children: N/A     Years of education: N/A     Occupational History     Not on file.     Social History Main Topics     Smoking status: Never Smoker     Smokeless tobacco: Never Used      Comment: socially only      Alcohol use No     Drug use: No     Sexual activity: Yes     Other Topics Concern     Not on file     Social History Narrative      Family History   Problem Relation Age of Onset     Glaucoma No family hx of      Macular Degeneration No family hx of              Vitals:   normotensive  FHT cat I    [place]: occastion    Alert Awake in NAD  HEENT grossly normal. Nystagmus of the eyes  Neck: no lymphadenopathy or thryoidomegaly  Lungs clear  Heart regular  ABD gravid,   Pelvic:  no fluid noted, no blood noted  Cervix is 3 cm / 80 % effaced at -2 station  AROM, clear fluid  EXT:  no edema or calf tenderness  Neuro:  Balance disturbance    Assessment:  IUP at 39w5d    IOL   GBS positive    Plan:  Admit to labor and delivery  vancomycin for gbs  AROM and pit  Epidural when pt desires  Continue care for anticipated vaginal delivery        Geoffrey Trujillo MD  Dept of OB/GYN  November 13, 2018

## 2018-11-13 NOTE — IP AVS SNAPSHOT
MRN:[id number]                      After Visit Summary   2018    Rosemary Davis    MRN: [id number]           Thank you!     Thank you for choosing [facility] for your care. Our goal is always to provide you with excellent care. Hearing back from our patients is one way we can continue to improve our services. Please take a few minutes to complete the written survey that you may receive in the mail after you visit with us. Thank you!        Patient Information     Date Of Birth          1990        Designated Caregiver       Most Recent Value    Caregiver    Will someone help with your care after discharge? yes    Name of designated caregiver Scot    Phone number of caregiver [phone]    Caregiver address lives with pt      About your hospital stay     You were admitted on:  2018 You last received care in the:  [address]    You were discharged on:  2018       Who to Call     For medical emergencies, please call 911.  For non-urgent questions about your medical care, please call your primary care provider or clinic, [phone]  For questions related to your surgery, please call your surgery clinic        Attending Provider     Provider Specialty    Geoffrey Trujillo MD OB/Gyn       Primary Care Provider Office Phone # Fax #    [facility] -[phone]      Further instructions from your care team       Postop  Birth Instructions    Activity       Do not lift more than 10 pounds for 6 weeks after surgery.  Ask family and friends for help when you need it.    No driving until you have stopped taking your pain medications (usually two weeks after surgery).    No heavy exercise or activity for 6 weeks.  Don't do anything that will put a strain on your surgery site.    Don't strain when using the toilet.  Your care team may prescribe a stool softener if you have problems with your bowel  movements.     To care for your incision:       Keep the incision clean and dry.    Do not soak your incision in water. No swimming or hot tubs until it has fully healed. You may soak in the bathtub if the water level is below your incision.    Do not use peroxide, gel, cream, lotion, or ointment on your incision.    Adjust your clothes to avoid pressure on your surgery site (check the elastic in your underwear for example).     You may see a small amount of clear or pink drainage and this is normal.  Check with your health care provider:       If the drainage increases or has an odor.    If the incision reddens, you have swelling, or develop a rash.    If you have increased pain and the medicine we prescribed doesn't help.    If you have a fever above 100.4 F (38 C) with or without chills when placing thermometer under your tongue.   The area around your incision (surgery wound), will feel numb.  This is normal. The numbness should go away in less than a year.     Keep your hands clean:  Always wash your hands before touching your incision (surgery wound). This helps reduce your risk of infection. If your hands aren't dirty, you may use an alcohol hand-rub to clean your hands. Keep your nails clean and short.    Call your healthcare provider if you have any of these symptoms:       You soak a sanitary pad with blood within 1 hour, or you see blood clots larger than a golf ball.    Bleeding that lasts more than 6 weeks.    Vaginal discharge that smells bad.    Severe pain, cramping or tenderness in your lower belly area.    A need to urinate more frequently (use the toilet more often), more urgently (use the toilet very quickly), or it burns when you urinate.    Nausea and vomiting.    Redness, swelling or pain around a vein in your leg.    Problems breastfeeding or a red or painful area on your breast.    Chest pain and cough or are gasping for air.    Problems with coping with sadness, anxiety or depression. If  "you have concerns about hurting yourself or the baby, call your provider immediately.      You have questions or concerns after you return home.                  Pending Results     No orders found from 2018 to 2018.            Statement of Approval     Ordered          18 0923  I have reviewed and agree with all the recommendations and orders detailed in this document.  EFFECTIVE NOW     Approved and electronically signed by:  Geoffrey Trujillo MD             Admission Information     Date & Time Provider Department Dept. Phone    2018 Geoffrey Trujillo MD [address] [phone]      Your Vitals Were     Blood Pressure Pulse Temperature Respirations Height Weight    125/81 100 97.8  F (36.6  C) (Oral) 16 1.524 m (5') 88.5 kg (195 lb)    Pulse Oximetry BMI (Body Mass Index)                100% 38.08 kg/m2          MyChart Information     PayDragon lets you send messages to your doctor, view your test results, renew your prescriptions, schedule appointments and more. To sign up, go to [email or web address] . Click on \"Log in\" on the left side of the screen, which will take you to the Welcome page. Then click on \"Sign up Now\" on the right side of the page.     You will be asked to enter the access code listed below, as well as some personal information. Please follow the directions to create your username and password.     Your access code is: [email or web address]  Expires: 2019 10:50 AM     Your access code will  in 90 days. If you need help or a new code, please call your [facility] clinic or [phone].        Care EveryWhere ID     This is your Care EveryWhere ID. This could be used by other organizations to access your [facility] medical records  [id number]        Equal Access to Services     JODIE HIRSCH : Perfecto Ryan, nikos ko, chip gibbs. So [facility] [phone].    ATENCIÓN: " Si alejandra boyd, tiene a mathew disposición servicios gratuitos de asistencia lingüística. Abigail you [phone].    We comply with applicable federal civil rights laws and [place] laws. We do not discriminate on the basis of race, color, national origin, age, disability, sex, sexual orientation, or gender identity.               Review of your medicines      START taking        Dose / Directions    ferrous sulfate 325 (65 Fe) MG tablet   Commonly known as:  IRON        Dose:  325 mg   Take 1 tablet (325 mg) by mouth 2 times daily   Quantity:  60 tablet   Refills:  2       oxyCODONE IR 5 MG tablet   Commonly known as:  ROXICODONE        Dose:  5-10 mg   Take 1-2 tablets (5-10 mg) by mouth every 3 hours as needed   Quantity:  10 tablet   Refills:  0         CONTINUE these medicines which may have CHANGED, or have new prescriptions. If we are uncertain of the size of tablets/capsules you have at home, strength may be listed as something that might have changed.        Dose / Directions    citalopram 20 MG tablet   Commonly known as:  celeXA   This may have changed:    - how much to take  - additional instructions   Used for:  Recurrent major depressive disorder, in full remission (H)        TAKE ONE TAB BY MOUTH ONCE DAILY   Quantity:  90 tablet   Refills:  3         CONTINUE these medicines which have NOT CHANGED        Dose / Directions    acetaminophen 325 MG tablet   Commonly known as:  TYLENOL   Used for:  Pain, Supracondylar fracture of humerus, closed        Dose:  650 mg   Take 2 tablets by mouth every 4 hours as needed.   Quantity:  250 tablet   Refills:  0       ibuprofen 200 MG capsule   Commonly known as:  ADVIL/MOTRIN   Used for:  Supracondylar fracture of humerus, closed, Nonunion of fracture, Pain        Dose:  400 mg   Take 400 mg by mouth every 4 hours as needed for fever.   Quantity:  100 capsule   Refills:  0       prenatal multivitamin plus iron 27-0.8 MG Tabs per tablet        Dose:  1 tablet    Take 1 tablet by mouth daily   Refills:  0       VITAMIN D (CHOLECALCIFEROL) PO        Dose:  5000 Units   Take 5,000 Units by mouth daily   Refills:  0            Where to get your medicines      These medications were sent to [facility] - [address]  [address] [place] [address]     Phone:  [phone]     ferrous sulfate 325 (65 Fe) MG tablet         Some of these will need a paper prescription and others can be bought over the counter. Ask your nurse if you have questions.     Bring a paper prescription for each of these medications     oxyCODONE IR 5 MG tablet                Protect others around you: Learn how to safely use, store and throw away your medicines at www.disposemymeds.org.        Information about OPIOIDS     PRESCRIPTION OPIOIDS: WHAT YOU NEED TO KNOW   We gave you an opioid (narcotic) pain medicine. It is important to manage your pain, but opioids are not always the best choice. You should first try all the other options your care team gave you. Take this medicine for as short a time (and as few doses) as possible.    Some activities can increase your pain, such as bandage changes or therapy sessions. It may help to take your pain medicine 30 to 60 minutes before these activities. Reduce your stress by getting enough sleep, working on hobbies you enjoy and practicing relaxation or meditation. Talk to your care team about ways to manage your pain beyond prescription opioids.    These medicines have risks:    DO NOT drive when on new or higher doses of pain medicine. These medicines can affect your alertness and reaction times, and you could be arrested for driving under the influence (DUI). If you need to use opioids long-term, talk to your care team about driving.    DO NOT operate heavy machinery    DO NOT do any other dangerous activities while taking these medicines.    DO NOT drink any alcohol while taking these medicines.     If the  opioid prescribed includes acetaminophen, DO NOT take with any other medicines that contain acetaminophen. Read all labels carefully. Look for the word  acetaminophen  or  Tylenol.  Ask your pharmacist if you have questions or are unsure.    You can get addicted to pain medicines, especially if you have a history of addiction (chemical, alcohol or substance dependence). Talk to your care team about ways to reduce this risk.    All opioids tend to cause constipation. Drink plenty of water and eat foods that have a lot of fiber, such as fruits, vegetables, prune juice, apple juice and high-fiber cereal. Take a laxative (Miralax, milk of magnesia, Colace, Senna) if you don t move your bowels at least every other day. Other side effects include upset stomach, sleepiness, dizziness, throwing up, tolerance (needing more of the medicine to have the same effect), physical dependence and slowed breathing.    Store your pills in a secure place, locked if possible. We will not replace any lost or stolen medicine. If you don t finish your medicine, please throw away (dispose) as directed by your pharmacist. The [place] Pollution Control Agency has more information about safe disposal: https://www.pca.[facility].[place].us/living-green/managing-unwanted-medications             Medication List: This is a list of all your medications and when to take them. Check marks below indicate your daily home schedule. Keep this list as a reference.      Medications           Morning Afternoon Evening Bedtime As Needed    acetaminophen 325 MG tablet   Commonly known as:  TYLENOL   Take 2 tablets by mouth every 4 hours as needed.   Last time this was given:  975 mg on 11/17/2018  8:42 AM                                citalopram 20 MG tablet   Commonly known as:  celeXA   TAKE ONE TAB BY MOUTH ONCE DAILY   Last time this was given:  20 mg on 11/17/2018  8:42 AM                                ferrous sulfate 325 (65 Fe) MG tablet   Commonly known as:   IRON   Take 1 tablet (325 mg) by mouth 2 times daily                                ibuprofen 200 MG capsule   Commonly known as:  ADVIL/MOTRIN   Take 400 mg by mouth every 4 hours as needed for fever.                                oxyCODONE IR 5 MG tablet   Commonly known as:  ROXICODONE   Take 1-2 tablets (5-10 mg) by mouth every 3 hours as needed                                prenatal multivitamin plus iron 27-0.8 MG Tabs per tablet   Take 1 tablet by mouth daily                                VITAMIN D (CHOLECALCIFEROL) PO   Take 5,000 Units by mouth daily

## 2018-11-13 NOTE — PLAN OF CARE
Primip at 39+5 here for an elective induction. POC discussed with pt, spouse and pt's mother. Monitors applied, assessment obtained. Orientated to room and call light.

## 2018-11-14 ENCOUNTER — ANESTHESIA (OUTPATIENT)
Dept: OBGYN | Facility: CLINIC | Age: 28
End: 2018-11-14
Payer: COMMERCIAL

## 2018-11-14 ENCOUNTER — SURGERY (OUTPATIENT)
Age: 28
End: 2018-11-14

## 2018-11-14 ENCOUNTER — ANESTHESIA EVENT (OUTPATIENT)
Dept: OBGYN | Facility: CLINIC | Age: 28
End: 2018-11-14
Payer: COMMERCIAL

## 2018-11-14 PROCEDURE — 25000128 H RX IP 250 OP 636: Performed by: OBSTETRICS & GYNECOLOGY

## 2018-11-14 PROCEDURE — 71000013 ZZH RECOVERY PHASE 1 LEVEL 1 EA ADDTL HR: Performed by: OBSTETRICS & GYNECOLOGY

## 2018-11-14 PROCEDURE — 25000132 ZZH RX MED GY IP 250 OP 250 PS 637: Performed by: OBSTETRICS & GYNECOLOGY

## 2018-11-14 PROCEDURE — P9041 ALBUMIN (HUMAN),5%, 50ML: HCPCS | Performed by: NURSE ANESTHETIST, CERTIFIED REGISTERED

## 2018-11-14 PROCEDURE — 25000128 H RX IP 250 OP 636

## 2018-11-14 PROCEDURE — 36000056 ZZH SURGERY LEVEL 3 1ST 30 MIN: Performed by: OBSTETRICS & GYNECOLOGY

## 2018-11-14 PROCEDURE — 36000058 ZZH SURGERY LEVEL 3 EA 15 ADDTL MIN: Performed by: OBSTETRICS & GYNECOLOGY

## 2018-11-14 PROCEDURE — 37000011 ZZH ANESTHESIA WARD SERVICE

## 2018-11-14 PROCEDURE — 40000671 ZZH STATISTIC ANESTHESIA CASE

## 2018-11-14 PROCEDURE — 25000125 ZZHC RX 250: Performed by: NURSE ANESTHETIST, CERTIFIED REGISTERED

## 2018-11-14 PROCEDURE — 25000125 ZZHC RX 250: Performed by: OBSTETRICS & GYNECOLOGY

## 2018-11-14 PROCEDURE — 00HU33Z INSERTION OF INFUSION DEVICE INTO SPINAL CANAL, PERCUTANEOUS APPROACH: ICD-10-PCS | Performed by: ANESTHESIOLOGY

## 2018-11-14 PROCEDURE — 12000037 ZZH R&B POSTPARTUM INTERMEDIATE

## 2018-11-14 PROCEDURE — 3E0R3BZ INTRODUCTION OF ANESTHETIC AGENT INTO SPINAL CANAL, PERCUTANEOUS APPROACH: ICD-10-PCS | Performed by: ANESTHESIOLOGY

## 2018-11-14 PROCEDURE — 27210794 ZZH OR GENERAL SUPPLY STERILE: Performed by: OBSTETRICS & GYNECOLOGY

## 2018-11-14 PROCEDURE — 25000128 H RX IP 250 OP 636: Performed by: NURSE ANESTHETIST, CERTIFIED REGISTERED

## 2018-11-14 PROCEDURE — 71000012 ZZH RECOVERY PHASE 1 LEVEL 1 FIRST HR: Performed by: OBSTETRICS & GYNECOLOGY

## 2018-11-14 PROCEDURE — 25000128 H RX IP 250 OP 636: Performed by: ANESTHESIOLOGY

## 2018-11-14 RX ORDER — SODIUM CHLORIDE, SODIUM LACTATE, POTASSIUM CHLORIDE, CALCIUM CHLORIDE 600; 310; 30; 20 MG/100ML; MG/100ML; MG/100ML; MG/100ML
INJECTION, SOLUTION INTRAVENOUS CONTINUOUS
Status: DISCONTINUED | OUTPATIENT
Start: 2018-11-14 | End: 2018-11-14

## 2018-11-14 RX ORDER — KETOROLAC TROMETHAMINE 30 MG/ML
30 INJECTION, SOLUTION INTRAMUSCULAR; INTRAVENOUS EVERY 6 HOURS
Status: COMPLETED | OUTPATIENT
Start: 2018-11-14 | End: 2018-11-15

## 2018-11-14 RX ORDER — LIDOCAINE 40 MG/G
CREAM TOPICAL
Status: DISCONTINUED | OUTPATIENT
Start: 2018-11-14 | End: 2018-11-14

## 2018-11-14 RX ORDER — NALOXONE HYDROCHLORIDE 0.4 MG/ML
.1-.4 INJECTION, SOLUTION INTRAMUSCULAR; INTRAVENOUS; SUBCUTANEOUS
Status: DISCONTINUED | OUTPATIENT
Start: 2018-11-14 | End: 2018-11-14

## 2018-11-14 RX ORDER — BUPIVACAINE HYDROCHLORIDE 7.5 MG/ML
INJECTION, SOLUTION INTRASPINAL
Status: DISCONTINUED
Start: 2018-11-14 | End: 2018-11-14 | Stop reason: HOSPADM

## 2018-11-14 RX ORDER — LIDOCAINE HCL/EPINEPHRINE/PF 2%-1:200K
VIAL (ML) INJECTION
Status: DISCONTINUED
Start: 2018-11-14 | End: 2018-11-14 | Stop reason: HOSPADM

## 2018-11-14 RX ORDER — OXYTOCIN/0.9 % SODIUM CHLORIDE 30/500 ML
100 PLASTIC BAG, INJECTION (ML) INTRAVENOUS CONTINUOUS
Status: DISCONTINUED | OUTPATIENT
Start: 2018-11-14 | End: 2018-11-17 | Stop reason: HOSPADM

## 2018-11-14 RX ORDER — SIMETHICONE 80 MG
80 TABLET,CHEWABLE ORAL 4 TIMES DAILY PRN
Status: DISCONTINUED | OUTPATIENT
Start: 2018-11-14 | End: 2018-11-17 | Stop reason: HOSPADM

## 2018-11-14 RX ORDER — ONDANSETRON 4 MG/1
4 TABLET, ORALLY DISINTEGRATING ORAL EVERY 6 HOURS PRN
Status: DISCONTINUED | OUTPATIENT
Start: 2018-11-14 | End: 2018-11-14

## 2018-11-14 RX ORDER — CITRIC ACID/SODIUM CITRATE 334-500MG
30 SOLUTION, ORAL ORAL
Status: COMPLETED | OUTPATIENT
Start: 2018-11-14 | End: 2018-11-14

## 2018-11-14 RX ORDER — NALBUPHINE HYDROCHLORIDE 10 MG/ML
2.5-5 INJECTION, SOLUTION INTRAMUSCULAR; INTRAVENOUS; SUBCUTANEOUS EVERY 6 HOURS PRN
Status: DISCONTINUED | OUTPATIENT
Start: 2018-11-14 | End: 2018-11-14

## 2018-11-14 RX ORDER — IBUPROFEN 400 MG/1
800 TABLET, FILM COATED ORAL EVERY 6 HOURS PRN
Status: DISCONTINUED | OUTPATIENT
Start: 2018-11-15 | End: 2018-11-17 | Stop reason: HOSPADM

## 2018-11-14 RX ORDER — ALBUMIN, HUMAN INJ 5% 5 %
SOLUTION INTRAVENOUS
Status: COMPLETED
Start: 2018-11-14 | End: 2018-11-14

## 2018-11-14 RX ORDER — DIPHENHYDRAMINE HYDROCHLORIDE 50 MG/ML
25 INJECTION INTRAMUSCULAR; INTRAVENOUS EVERY 6 HOURS PRN
Status: DISCONTINUED | OUTPATIENT
Start: 2018-11-14 | End: 2018-11-17 | Stop reason: HOSPADM

## 2018-11-14 RX ORDER — ONDANSETRON 2 MG/ML
INJECTION INTRAMUSCULAR; INTRAVENOUS
Status: DISCONTINUED
Start: 2018-11-14 | End: 2018-11-14 | Stop reason: HOSPADM

## 2018-11-14 RX ORDER — MORPHINE SULFATE 1 MG/ML
INJECTION, SOLUTION EPIDURAL; INTRATHECAL; INTRAVENOUS
Status: DISCONTINUED
Start: 2018-11-14 | End: 2018-11-14 | Stop reason: HOSPADM

## 2018-11-14 RX ORDER — ALBUMIN, HUMAN INJ 5% 5 %
SOLUTION INTRAVENOUS CONTINUOUS PRN
Status: DISCONTINUED | OUTPATIENT
Start: 2018-11-14 | End: 2018-11-14

## 2018-11-14 RX ORDER — OXYCODONE HYDROCHLORIDE 5 MG/1
5-10 TABLET ORAL
Status: DISCONTINUED | OUTPATIENT
Start: 2018-11-14 | End: 2018-11-17 | Stop reason: HOSPADM

## 2018-11-14 RX ORDER — LIDOCAINE 40 MG/G
CREAM TOPICAL
Status: DISCONTINUED | OUTPATIENT
Start: 2018-11-14 | End: 2018-11-17 | Stop reason: HOSPADM

## 2018-11-14 RX ORDER — OXYTOCIN/0.9 % SODIUM CHLORIDE 30/500 ML
PLASTIC BAG, INJECTION (ML) INTRAVENOUS
Status: DISCONTINUED
Start: 2018-11-14 | End: 2018-11-14 | Stop reason: HOSPADM

## 2018-11-14 RX ORDER — OXYTOCIN 10 [USP'U]/ML
10 INJECTION, SOLUTION INTRAMUSCULAR; INTRAVENOUS
Status: DISCONTINUED | OUTPATIENT
Start: 2018-11-14 | End: 2018-11-17 | Stop reason: HOSPADM

## 2018-11-14 RX ORDER — MORPHINE SULFATE 1 MG/ML
INJECTION, SOLUTION EPIDURAL; INTRATHECAL; INTRAVENOUS PRN
Status: DISCONTINUED | OUTPATIENT
Start: 2018-11-14 | End: 2018-11-14

## 2018-11-14 RX ORDER — NALBUPHINE HYDROCHLORIDE 10 MG/ML
2.5-5 INJECTION, SOLUTION INTRAMUSCULAR; INTRAVENOUS; SUBCUTANEOUS EVERY 6 HOURS PRN
Status: DISCONTINUED | OUTPATIENT
Start: 2018-11-14 | End: 2018-11-17 | Stop reason: HOSPADM

## 2018-11-14 RX ORDER — ONDANSETRON 2 MG/ML
INJECTION INTRAMUSCULAR; INTRAVENOUS PRN
Status: DISCONTINUED | OUTPATIENT
Start: 2018-11-14 | End: 2018-11-14

## 2018-11-14 RX ORDER — FENTANYL CITRATE 50 UG/ML
INJECTION, SOLUTION INTRAMUSCULAR; INTRAVENOUS
Status: DISCONTINUED
Start: 2018-11-14 | End: 2018-11-14 | Stop reason: HOSPADM

## 2018-11-14 RX ORDER — AMOXICILLIN 250 MG
2 CAPSULE ORAL 2 TIMES DAILY PRN
Status: DISCONTINUED | OUTPATIENT
Start: 2018-11-14 | End: 2018-11-17 | Stop reason: HOSPADM

## 2018-11-14 RX ORDER — EPHEDRINE SULFATE 50 MG/ML
INJECTION, SOLUTION INTRAMUSCULAR; INTRAVENOUS; SUBCUTANEOUS PRN
Status: DISCONTINUED | OUTPATIENT
Start: 2018-11-14 | End: 2018-11-14

## 2018-11-14 RX ORDER — HYDROMORPHONE HYDROCHLORIDE 1 MG/ML
.3-.5 INJECTION, SOLUTION INTRAMUSCULAR; INTRAVENOUS; SUBCUTANEOUS EVERY 30 MIN PRN
Status: DISCONTINUED | OUTPATIENT
Start: 2018-11-14 | End: 2018-11-17 | Stop reason: HOSPADM

## 2018-11-14 RX ORDER — NALOXONE HYDROCHLORIDE 0.4 MG/ML
.1-.4 INJECTION, SOLUTION INTRAMUSCULAR; INTRAVENOUS; SUBCUTANEOUS
Status: DISCONTINUED | OUTPATIENT
Start: 2018-11-14 | End: 2018-11-17 | Stop reason: HOSPADM

## 2018-11-14 RX ORDER — ACETAMINOPHEN 325 MG/1
TABLET ORAL
Status: DISCONTINUED
Start: 2018-11-14 | End: 2018-11-14 | Stop reason: WASHOUT

## 2018-11-14 RX ORDER — FENTANYL CITRATE 50 UG/ML
100 INJECTION, SOLUTION INTRAMUSCULAR; INTRAVENOUS ONCE
Status: DISCONTINUED | OUTPATIENT
Start: 2018-11-14 | End: 2018-11-14

## 2018-11-14 RX ORDER — OXYTOCIN/0.9 % SODIUM CHLORIDE 30/500 ML
340 PLASTIC BAG, INJECTION (ML) INTRAVENOUS CONTINUOUS PRN
Status: DISCONTINUED | OUTPATIENT
Start: 2018-11-14 | End: 2018-11-17 | Stop reason: HOSPADM

## 2018-11-14 RX ORDER — ONDANSETRON 2 MG/ML
4 INJECTION INTRAMUSCULAR; INTRAVENOUS EVERY 6 HOURS PRN
Status: DISCONTINUED | OUTPATIENT
Start: 2018-11-14 | End: 2018-11-14

## 2018-11-14 RX ORDER — ACETAMINOPHEN 325 MG/1
650 TABLET ORAL EVERY 4 HOURS PRN
Status: DISCONTINUED | OUTPATIENT
Start: 2018-11-17 | End: 2018-11-17 | Stop reason: HOSPADM

## 2018-11-14 RX ORDER — ONDANSETRON 2 MG/ML
4 INJECTION INTRAMUSCULAR; INTRAVENOUS EVERY 6 HOURS PRN
Status: DISCONTINUED | OUTPATIENT
Start: 2018-11-14 | End: 2018-11-17 | Stop reason: HOSPADM

## 2018-11-14 RX ORDER — OXYTOCIN/0.9 % SODIUM CHLORIDE 30/500 ML
PLASTIC BAG, INJECTION (ML) INTRAVENOUS CONTINUOUS PRN
Status: DISCONTINUED | OUTPATIENT
Start: 2018-11-14 | End: 2018-11-14

## 2018-11-14 RX ORDER — BISACODYL 10 MG
10 SUPPOSITORY, RECTAL RECTAL DAILY PRN
Status: DISCONTINUED | OUTPATIENT
Start: 2018-11-16 | End: 2018-11-17 | Stop reason: HOSPADM

## 2018-11-14 RX ORDER — KETOROLAC TROMETHAMINE 30 MG/ML
INJECTION, SOLUTION INTRAMUSCULAR; INTRAVENOUS
Status: COMPLETED
Start: 2018-11-14 | End: 2018-11-14

## 2018-11-14 RX ORDER — CITALOPRAM HYDROBROMIDE 20 MG/1
20 TABLET ORAL DAILY
Status: DISCONTINUED | OUTPATIENT
Start: 2018-11-14 | End: 2018-11-17 | Stop reason: HOSPADM

## 2018-11-14 RX ORDER — AMOXICILLIN 250 MG
1 CAPSULE ORAL 2 TIMES DAILY PRN
Status: DISCONTINUED | OUTPATIENT
Start: 2018-11-14 | End: 2018-11-17 | Stop reason: HOSPADM

## 2018-11-14 RX ORDER — BUPIVACAINE HYDROCHLORIDE 7.5 MG/ML
INJECTION, SOLUTION INTRASPINAL PRN
Status: DISCONTINUED | OUTPATIENT
Start: 2018-11-14 | End: 2018-11-14

## 2018-11-14 RX ORDER — DIPHENHYDRAMINE HCL 25 MG
25 CAPSULE ORAL EVERY 6 HOURS PRN
Status: DISCONTINUED | OUTPATIENT
Start: 2018-11-14 | End: 2018-11-17 | Stop reason: HOSPADM

## 2018-11-14 RX ORDER — CLINDAMYCIN PHOSPHATE 900 MG/50ML
900 INJECTION, SOLUTION INTRAVENOUS
Status: COMPLETED | OUTPATIENT
Start: 2018-11-14 | End: 2018-11-14

## 2018-11-14 RX ORDER — LANOLIN 100 %
OINTMENT (GRAM) TOPICAL
Status: DISCONTINUED | OUTPATIENT
Start: 2018-11-14 | End: 2018-11-17 | Stop reason: HOSPADM

## 2018-11-14 RX ORDER — HYDROCORTISONE 2.5 %
CREAM (GRAM) TOPICAL 3 TIMES DAILY PRN
Status: DISCONTINUED | OUTPATIENT
Start: 2018-11-14 | End: 2018-11-17 | Stop reason: HOSPADM

## 2018-11-14 RX ORDER — DEXTROSE, SODIUM CHLORIDE, SODIUM LACTATE, POTASSIUM CHLORIDE, AND CALCIUM CHLORIDE 5; .6; .31; .03; .02 G/100ML; G/100ML; G/100ML; G/100ML; G/100ML
INJECTION, SOLUTION INTRAVENOUS CONTINUOUS
Status: DISCONTINUED | OUTPATIENT
Start: 2018-11-14 | End: 2018-11-17 | Stop reason: HOSPADM

## 2018-11-14 RX ORDER — ACETAMINOPHEN 325 MG/1
975 TABLET ORAL EVERY 8 HOURS
Status: DISCONTINUED | OUTPATIENT
Start: 2018-11-14 | End: 2018-11-17 | Stop reason: HOSPADM

## 2018-11-14 RX ADMIN — ALBUMIN (HUMAN): 12.5 SOLUTION INTRAVENOUS at 07:56

## 2018-11-14 RX ADMIN — PHENYLEPHRINE HYDROCHLORIDE 200 MCG: 10 INJECTION, SOLUTION INTRAMUSCULAR; INTRAVENOUS; SUBCUTANEOUS at 08:15

## 2018-11-14 RX ADMIN — AZITHROMYCIN MONOHYDRATE 0.5 G: 500 INJECTION, POWDER, LYOPHILIZED, FOR SOLUTION INTRAVENOUS at 07:28

## 2018-11-14 RX ADMIN — PHENYLEPHRINE HYDROCHLORIDE 150 MCG: 10 INJECTION, SOLUTION INTRAMUSCULAR; INTRAVENOUS; SUBCUTANEOUS at 07:49

## 2018-11-14 RX ADMIN — PHENYLEPHRINE HYDROCHLORIDE 150 MCG: 10 INJECTION, SOLUTION INTRAMUSCULAR; INTRAVENOUS; SUBCUTANEOUS at 07:55

## 2018-11-14 RX ADMIN — Medication 5 MG: at 08:17

## 2018-11-14 RX ADMIN — PHENYLEPHRINE HYDROCHLORIDE 200 MCG: 10 INJECTION, SOLUTION INTRAMUSCULAR; INTRAVENOUS; SUBCUTANEOUS at 07:39

## 2018-11-14 RX ADMIN — SODIUM CHLORIDE, POTASSIUM CHLORIDE, SODIUM LACTATE AND CALCIUM CHLORIDE: 600; 310; 30; 20 INJECTION, SOLUTION INTRAVENOUS at 07:29

## 2018-11-14 RX ADMIN — PHENYLEPHRINE HYDROCHLORIDE 200 MCG: 10 INJECTION, SOLUTION INTRAMUSCULAR; INTRAVENOUS; SUBCUTANEOUS at 08:13

## 2018-11-14 RX ADMIN — PHENYLEPHRINE HYDROCHLORIDE 100 MCG: 10 INJECTION, SOLUTION INTRAMUSCULAR; INTRAVENOUS; SUBCUTANEOUS at 07:27

## 2018-11-14 RX ADMIN — CITALOPRAM HYDROBROMIDE 20 MG: 20 TABLET ORAL at 14:54

## 2018-11-14 RX ADMIN — PHENYLEPHRINE HYDROCHLORIDE 100 MCG: 10 INJECTION, SOLUTION INTRAMUSCULAR; INTRAVENOUS; SUBCUTANEOUS at 07:33

## 2018-11-14 RX ADMIN — PHENYLEPHRINE HYDROCHLORIDE 200 MCG: 10 INJECTION, SOLUTION INTRAMUSCULAR; INTRAVENOUS; SUBCUTANEOUS at 07:58

## 2018-11-14 RX ADMIN — CLINDAMYCIN IN 5 PERCENT DEXTROSE 900 MG: 18 INJECTION, SOLUTION INTRAVENOUS at 06:50

## 2018-11-14 RX ADMIN — Medication 340 ML/HR: at 07:38

## 2018-11-14 RX ADMIN — PHENYLEPHRINE HYDROCHLORIDE 150 MCG: 10 INJECTION, SOLUTION INTRAMUSCULAR; INTRAVENOUS; SUBCUTANEOUS at 07:43

## 2018-11-14 RX ADMIN — OXYTOCIN-SODIUM CHLORIDE 0.9% IV SOLN 30 UNIT/500ML 100 ML/HR: 30-0.9/5 SOLUTION at 12:00

## 2018-11-14 RX ADMIN — SODIUM CITRATE AND CITRIC ACID MONOHYDRATE 30 ML: 500; 334 SOLUTION ORAL at 06:52

## 2018-11-14 RX ADMIN — ONDANSETRON 4 MG: 2 INJECTION INTRAMUSCULAR; INTRAVENOUS at 07:25

## 2018-11-14 RX ADMIN — ACETAMINOPHEN 975 MG: 325 TABLET, FILM COATED ORAL at 13:54

## 2018-11-14 RX ADMIN — PHENYLEPHRINE HYDROCHLORIDE 100 MCG: 10 INJECTION, SOLUTION INTRAMUSCULAR; INTRAVENOUS; SUBCUTANEOUS at 07:25

## 2018-11-14 RX ADMIN — SENNOSIDES AND DOCUSATE SODIUM 1 TABLET: 8.6; 5 TABLET ORAL at 20:28

## 2018-11-14 RX ADMIN — MORPHINE SULFATE 0.2 MG: 1 INJECTION, SOLUTION EPIDURAL; INTRATHECAL; INTRAVENOUS at 07:17

## 2018-11-14 RX ADMIN — PHENYLEPHRINE HYDROCHLORIDE 100 MCG: 10 INJECTION, SOLUTION INTRAMUSCULAR; INTRAVENOUS; SUBCUTANEOUS at 07:19

## 2018-11-14 RX ADMIN — PHENYLEPHRINE HYDROCHLORIDE 200 MCG: 10 INJECTION, SOLUTION INTRAMUSCULAR; INTRAVENOUS; SUBCUTANEOUS at 08:00

## 2018-11-14 RX ADMIN — GENTAMICIN SULFATE 140 MG: 40 INJECTION, SOLUTION INTRAMUSCULAR; INTRAVENOUS at 07:14

## 2018-11-14 RX ADMIN — FENTANYL CITRATE 100 MCG: 50 INJECTION, SOLUTION INTRAMUSCULAR; INTRAVENOUS at 05:43

## 2018-11-14 RX ADMIN — KETOROLAC TROMETHAMINE 30 MG: 30 INJECTION, SOLUTION INTRAMUSCULAR at 09:01

## 2018-11-14 RX ADMIN — PHENYLEPHRINE HYDROCHLORIDE 100 MCG: 10 INJECTION, SOLUTION INTRAMUSCULAR; INTRAVENOUS; SUBCUTANEOUS at 07:37

## 2018-11-14 RX ADMIN — BUPIVACAINE HYDROCHLORIDE IN DEXTROSE 12 MG: 7.5 INJECTION, SOLUTION SUBARACHNOID at 07:17

## 2018-11-14 RX ADMIN — PHENYLEPHRINE HYDROCHLORIDE 100 MCG: 10 INJECTION, SOLUTION INTRAMUSCULAR; INTRAVENOUS; SUBCUTANEOUS at 08:17

## 2018-11-14 RX ADMIN — PHENYLEPHRINE HYDROCHLORIDE 150 MCG: 10 INJECTION, SOLUTION INTRAMUSCULAR; INTRAVENOUS; SUBCUTANEOUS at 07:53

## 2018-11-14 RX ADMIN — SODIUM CHLORIDE, POTASSIUM CHLORIDE, SODIUM LACTATE AND CALCIUM CHLORIDE 1000 ML: 600; 310; 30; 20 INJECTION, SOLUTION INTRAVENOUS at 06:45

## 2018-11-14 RX ADMIN — PHENYLEPHRINE HYDROCHLORIDE 150 MCG: 10 INJECTION, SOLUTION INTRAMUSCULAR; INTRAVENOUS; SUBCUTANEOUS at 07:45

## 2018-11-14 RX ADMIN — KETOROLAC TROMETHAMINE 30 MG: 30 INJECTION, SOLUTION INTRAMUSCULAR at 14:54

## 2018-11-14 RX ADMIN — KETOROLAC TROMETHAMINE 30 MG: 30 INJECTION, SOLUTION INTRAMUSCULAR at 20:28

## 2018-11-14 RX ADMIN — SODIUM CHLORIDE, SODIUM LACTATE, POTASSIUM CHLORIDE, CALCIUM CHLORIDE AND DEXTROSE MONOHYDRATE: 5; 600; 310; 30; 20 INJECTION, SOLUTION INTRAVENOUS at 17:39

## 2018-11-14 RX ADMIN — PHENYLEPHRINE HYDROCHLORIDE 100 MCG: 10 INJECTION, SOLUTION INTRAMUSCULAR; INTRAVENOUS; SUBCUTANEOUS at 07:51

## 2018-11-14 ASSESSMENT — LIFESTYLE VARIABLES
TOBACCO_USE: 0
TOBACCO_USE: 0

## 2018-11-14 NOTE — ANESTHESIA POSTPROCEDURE EVALUATION
Patient: Rosemary Anastasia Frailing    Procedure(s):   SECTION    Diagnosis:pregnancy  Diagnosis Additional Information: No value filed.    Anesthesia Type:  Epidural    Note:  Anesthesia Post Evaluation         Comments: To OR for  section.        Last vitals:  Vitals:    18 0600 18 0630 18 0700   BP: 128/70 139/63 120/66   Pulse:      Resp:      Temp:      SpO2:            Electronically Signed By: Saman Barton MD  2018  8:03 AM

## 2018-11-14 NOTE — PLAN OF CARE
Problem: Labor (Cervical Ripen, Induct, Augment) (Adult,Obstetrics,Pediatric)  Goal: Signs and Symptoms of Listed Potential Problems Will be Absent, Minimized or Managed (Labor)  Signs and symptoms of listed potential problems will be absent, minimized or managed by discharge/transition of care (reference Labor (Cervical Ripen, Induct, Augment) (Adult,Obstetrics,Pediatric) C[facility]).   Outcome: Therapy, progress toward functional goals as expected  Vital signs stable. Making cervical change. Comfortable with epidural. Titrating pitocin to maintain adequate MVU's.

## 2018-11-14 NOTE — PLAN OF CARE
Problem: Patient Care Overview  Goal: Plan of Care/Patient Progress Review  Outcome: No Change  Pt. arrived on floor at 1015, oriented to room and safety protocols. VSS, fundus firm, scant to small flow. Inc. intact with surgical glue. IV Pitocin infusing. Nielson draining clear, angelita urine. Tolerating juice and crackers. Breastfeeding her baby boy with a shield. Attempted to assist pt. up at side of bed with assist of two and d/t her ataxia she was unable to make it to the BR. Assisted back to bed and isidro care done. Toradol and Tylenol for pain. Resting between cares.

## 2018-11-14 NOTE — PLAN OF CARE
2315- Bedside report received from Maxine GARCIA  2330- SVE. See flowsheet for data. Pitocin augmentation per protocol.  0230- SVE  See flowesheet for data.   0245 - Dr. Trujillo updated with but not limited to Labor status and status update, SVE, Pitocin augmentation status.  Per MD stop Pitocin for 30 min and restart after 30 min at half the dose.   0515- MDA paged for pain request.  0530 - Epidural Bolus given, SVE. IUPC removed inadequate reading. and Pitocin stopped.  0545- MDA at bedside for pain request by patient.  0550- Dr. Trujillo updated with but not limited to SVE, Status update and to come in and assess patient.  0630- Dr. Trujillo at bedside SVE performed, POC discussed with patient. Consent for  signed and discussed for arrest of descent.   0650 - Antibiotic infusing   0700- MDA at bedside discussing POC with patient regarding .   0710- Report given to Joana GARCIA. Pt transferred to OR.

## 2018-11-14 NOTE — ANESTHESIA PREPROCEDURE EVALUATION
Procedure: Procedure(s):   SECTION  Preop diagnosis: pregnancy    Allergies   Allergen Reactions     Sulfa Drugs Nausea and Vomiting     Cold/shivering/conjunctival injection     Amoxicillin Hives     Past Medical History:   Diagnosis Date     Ataxia      Depressive disorder      Humerus fracture initial injury  2012    ORIF left humerus in 2012     Missed       Past Surgical History:   Procedure Laterality Date     DENTAL SURGERY      wisdom extraction      EYE SURGERY      muscle repair for wandering eye--bilat     OPEN REDUCTION INTERNAL FIXATION ELBOW  2012    Procedure...:OPEN REDUCTION INTERNAL FIXATION ELBOW; REPAIR OF NON-UNION, LEFT ELBOW ; Surgeon:BREE SADLER; Location: OR     OPEN REDUCTION INTERNAL FIXATION HUMERUS DISTAL  2012    Procedure:OPEN REDUCTION INTERNAL FIXATION HUMERUS DISTAL; ORIF LEFT DISTAL HUMERUS FRACTURE. SYNTHES ELBOW PLATES, MINI C-ARM; Surgeon:BREE SADLER; Location: OR     Social History   Substance Use Topics     Smoking status: Never Smoker     Smokeless tobacco: Never Used      Comment: socially only      Alcohol use No     Prior to Admission medications    Medication Sig Start Date End Date Taking? Authorizing Provider   acetaminophen (TYLENOL) 325 MG tablet Take 2 tablets by mouth every 4 hours as needed. 12  Yes Jabari Cleveland PA-C   citalopram (CELEXA) 20 MG tablet TAKE ONE TAB BY MOUTH ONCE DAILY  Patient taking differently: 10 mg TAKE ONE TAB BY MOUTH ONCE DAILY 18  Yes Albina Cano APRN CNP   Prenatal Vit-Fe Fumarate-FA (PRENATAL MULTIVITAMIN PLUS IRON) 27-0.8 MG TABS per tablet Take 1 tablet by mouth daily   Yes Reported, Patient   VITAMIN D, CHOLECALCIFEROL, PO Take 5,000 Units by mouth daily   Yes Reported, Patient   ibuprofen 200 MG capsule Take 400 mg by mouth every 4 hours as needed for fever. 12   Bree Sadler MD     Current Facility-Administered Medications Ordered in Epic    Medication Dose Route Frequency Last Rate Last Dose     acetaminophen (TYLENOL) tablet 650 mg  650 mg Oral Q4H PRN         azithromycin (ZITHROMAX) 500 mg in sodium chloride 0.9 % 250 mL intermittent infusion  500 mg Intravenous Pre-Op/Pre-procedure x 1 dose         carboprost (HEMABATE) injection 250 mcg  250 mcg Intramuscular Once PRN         clindamycin (CLEOCIN) infusion 900 mg  900 mg Intravenous Pre-Op/Pre-procedure x 1 dose 50 mL/hr at 11/14/18 0650 900 mg at 11/14/18 0650     ePHEDrine injection 5 mg  5 mg Intravenous Q3 Min PRN         fentaNYL (PF) (SUBLIMAZE) injection 100 mcg  100 mcg EPIDURAL Once         fentaNYL (PF) (SUBLIMAZE) injection  mcg   mcg Intravenous Q1H PRN         fentaNYL (SUBLIMAZE) 2 mcg/mL, ropivacaine (NAROPIN) 0.2% in NaCl 0.9% EPIDURAL infusion   EPIDURAL Continuous 10 mL/hr at 11/13/18 2330       gentamicin (GARAMYCIN) 140 mg in sodium chloride 0.9 % 50 mL intermittent infusion  1.5 mg/kg Intravenous Pre-Op/Pre-procedure x 1 dose         ibuprofen (ADVIL/MOTRIN) tablet 800 mg  800 mg Oral Once PRN         lactated ringers BOLUS 1,000 mL  1,000 mL Intravenous Once         lactated ringers BOLUS 250 mL  250 mL Intravenous Once PRN         lactated ringers BOLUS 500 mL  500 mL Intravenous Once PRN         lactated ringers infusion   Intravenous Continuous         lactated ringers infusion   Intravenous Continuous 125 mL/hr at 11/13/18 1927       lidocaine (LMX4) cream   Topical Q1H PRN         lidocaine (LMX4) cream   Topical Q1H PRN         lidocaine 1 % 0.1-20 mL  0.1-20 mL Subcutaneous Once PRN         lidocaine 1 % 1 mL  1 mL Other Q1H PRN         lidocaine 1 % 1 mL  1 mL Other Q1H PRN         lidocaine-EPINEPHrine 1.5 %-1:[id number] injection    PRN   3 mL at 11/13/18 1854     medication instruction   Does not apply Continuous PRN         medication instruction   Does not apply Continuous PRN         medication instruction   Does not apply Continuous PRN          "Medication Instructions: misoprostol (CYTOTEC)- Nurse to discuss ordering with provider, if needed. Ordered via \"OB misoprostol (CYTOTEC) Postpartum Hemorrhage PANEL\"   Does not apply Continuous PRN         methylergonovine (METHERGINE) injection 200 mcg  200 mcg Intramuscular Once PRN         nalbuphine (NUBAIN) injection 2.5-5 mg  2.5-5 mg Intravenous Q6H PRN         nalbuphine (NUBAIN) injection 2.5-5 mg  2.5-5 mg Intravenous Q6H PRN         naloxone (NARCAN) injection 0.1-0.4 mg  0.1-0.4 mg Intravenous Q2 Min PRN         naloxone (NARCAN) injection 0.1-0.4 mg  0.1-0.4 mg Intravenous Q2 Min PRN         naloxone (NARCAN) injection 0.1-0.4 mg  0.1-0.4 mg Intravenous Q2 Min PRN         ondansetron (ZOFRAN-ODT) ODT tab 4 mg  4 mg Oral Q6H PRN        Or     ondansetron (ZOFRAN) injection 4 mg  4 mg Intravenous Q6H PRN         ondansetron (ZOFRAN) injection 4 mg  4 mg Intravenous Q6H PRN         Opioid plan postpartum - medication instruction   Does not apply Continuous PRN         Opioid plan postpartum - medication instruction   Does not apply Continuous PRN         oxyCODONE-acetaminophen (PERCOCET) 5-325 MG per tablet 1 tablet  1 tablet Oral Once PRN         oxytocin (PITOCIN) 30 units in 500 mL 0.9% NaCl infusion  100-340 mL/hr Intravenous Continuous PRN         oxytocin (PITOCIN) 30 units in 500 mL 0.9% NaCl infusion  1-24 natalie-units/min Intravenous Continuous 16 mL/hr at 11/14/18 0500 16 natalie-units/min at 11/14/18 0500     oxytocin (PITOCIN) injection 10 Units  10 Units Intramuscular Once PRN         oxytocin in 0.9% NaCl (PITOCIN) 30 units/500 mL infusion             sodium chloride (PF) 0.9% PF flush 3 mL  3 mL Intracatheter Q1H PRN         sodium chloride (PF) 0.9% PF flush 3 mL  3 mL Intracatheter Q8H         sodium chloride (PF) 0.9% PF flush 3 mL  3 mL Intracatheter Q1H PRN         sodium chloride (PF) 0.9% PF flush 3 mL  3 mL Intracatheter Q8H         tranexamic acid (CYKLOKAPRON) Bolus 1g vial attach " to NaCl 50 or 100 mL bag ADULT  1 g Intravenous Q30 Min PRN         vancomycin (VANCOCIN) 1000 mg in dextrose 5% 200 mL PREMIX  1,000 mg Intravenous Q12H 200 mL/hr at 11/13/18 2048 1,000 mg at 11/13/18 2048     No current Psychiatric-ordered outpatient prescriptions on file.       Wt Readings from Last 1 Encounters:   11/13/18 88.5 kg (195 lb)     Temp Readings from Last 1 Encounters:   11/14/18 38.2  C (100.8  F) (Temporal)     BP Readings from Last 6 Encounters:   11/14/18 109/62   11/08/18 122/82   09/20/17 108/74   10/27/15 94/58   10/20/15 112/74   04/28/15 118/82     Pulse Readings from Last 4 Encounters:   11/13/18 100   11/08/18 96   09/20/17 88   10/27/15 84     Resp Readings from Last 1 Encounters:   11/13/18 16     SpO2 Readings from Last 1 Encounters:   11/14/18 97%     Recent Labs   Lab Test  09/20/17   1347  10/17/12   1606  01/27/12   1800   NA   --   137  139   POTASSIUM   --   4.0  3.4   CHLORIDE   --   99  104   CO2   --    --   21   ANIONGAP   --    --   15   GLC  86  88  104*   BUN   --   13  28*  14   CR   --   0.47*  0.58   KATHRYN   --   10.0  9.2     Recent Labs   Lab Test  10/17/12   1606   AST  127*   ALT  178*   ALKPHOS  88   BILITOTAL  0.2     Recent Labs   Lab Test  11/13/18   0802  09/20/17   1353   WBC  8.0  9.4   HGB  11.5*  14.6   PLT  299  305     Recent Labs   Lab Test  11/13/18   0802   ABO  O   RH  Pos     No results for input(s): INR, PTT in the last 69999 hours.   No results for input(s): TROPI in the last 73334 hours.  No results for input(s): PH, PCO2, PO2, HCO3 in the last 63616 hours.  Recent Labs   Lab Test  05/25/12   1150   HCG  Negative     Recent Results (from the past 744 hour(s))   [facility] US Comprehensive Single    Narrative            Comprehensive  ---------------------------------------------------------------------------------------------------------  Pat. Name: YADI RAINA       Study Date:  10/26/2018 10:54am  Pat. 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      METHOD  ---------------------------------------------------------------------------------------------------------  Transabdominal ultrasound examination. View: Suboptimal view: limited by maternal body habitus and limited by late gestational age.      PREGNANCY  ---------------------------------------------------------------------------------------------------------  Mahmood pregnancy. Number of fetuses: 1      DATING  ---------------------------------------------------------------------------------------------------------                                           Date                                Details                                                                                      Gest. age                      GARFIELD  LMP                                  2018                                                                                                                         34 w + 2 d                     2018  Prior assessment               2018                          GA: 11 w + 5 d                                                                           37 w + 1 d                     11/15/2018  U/S                                   10/26/2018                       based upon AC, BPD, Femur, HC                                                38 w + 1 d                     2018  Assigned dating                  Dating performed on 10/26/2018, based on the prior assessment (on 2018)                    37 w + 1 d                     11/15/2018      GENERAL  EVALUATION  ---------------------------------------------------------------------------------------------------------  Cardiac activity present.  bpm.  Fetal movements present.  Presentation cephalic.  Placenta posterior.  Umbilical cord 3 vessel cord, suboptimal placental cord insert .  Amniotic fluid Amount of AF: normal. MVP 5.6 cm. ESTRELLITA 17.2 cm. Q1 5.1 cm, Q2 5.6 cm, Q3 2.2 cm, Q4 4.3 cm.      FETAL BIOMETRY  ---------------------------------------------------------------------------------------------------------  Main Fetal Biometry:  BPD                                        93.1                    mm                         37w 6d                Hadlock  OFD                                        114.4                  mm                          35w 5d                Nicolaides  HC                                          333.2                  mm                          38w 0d                Hadlock  AC                                          364.9                  mm                          40w 3d                Hadlock  Femur                                      70.4                   mm                          36w 1d                Hadlock  Humerus                                  62.6                    mm                         36w 2d                Jacklyn  Fetal Weight Calculation:  EFW                                       3,636                  g                                     86%         Brandon  EFW (lb,oz)                             8 lb 0                  oz  EFW by                                        Hadlock (BPD-HC-AC-FL)  Head / Face / Neck Biometry:                                             2.8                     mm  Nasal bone                               9.7                     mm      FETAL ANATOMY  ---------------------------------------------------------------------------------------------------------  The following structures appear normal:  Head / Neck                          Cranium. Head size. Head shape. Lateral ventricles. Choroid plexus. Midline falx. Parenchyma. Thalami.                                             Neck.  Face                                   Lips. Profile. Nose. Orbits. Lens.  Heart / Thorax                      4-chamber view. RVOT view. LVOT view. Situs. Cardiac position. Cardiac size. Cardiac rhythm.                                             Right lung. Left lung. Diaphragm.  Abdomen                             Abdominal wall. Cord insertion. Stomach. Kidneys. Bladder. Liver. Bowel. Genitals.  Spine                                  Cervical spine. Thoracic spine. Lumbar spine. Sacral spine.    The following structures could not be adequately visualized:  Head / Neck                         Cavum septi pellucidi. Cerebellum. Cisterna magna. Vermis.  Face                                   Maxilla. Mandible.  Heart / Thorax                      Aortic arch view. Bicaval view. Ductal arch view. 3-vessel view. 3-vessel-trachea view.  Extremities / Skeleton          Right hand. Left hand. Right foot. Left foot.    Gender: male.      MATERNAL STRUCTURES  ---------------------------------------------------------------------------------------------------------  Cervix                                  Visualized                                             Appearance: Appears Closed  Right Ovary                          Visualized  Left Ovary                            Not visualized      RECOMMENDATION  ---------------------------------------------------------------------------------------------------------    We discussed the findings on today's ultrasound with the patient.    Return to primary provider for continued prenatal care.    No indication for early deliver ir increased surveillance.    Further ultrasound studies as clinically indicated.    Thank you for the opportunity to participate in the care of this patient. If you have questions  regarding today's evaluation or if we can be of further service, please contact the  [facility].    **Fetal anomalies may be present but not detected**        Impression    IMPRESSION  ---------------------------------------------------------------------------------------------------------    Patient here for a comprehensive fetal growth scan secondary to a diagnosis of UVV. She is at 37w1d gestational age.    Active single fetus with behavior appropriate for gestational age.    Appropriate interval fetal growth.    Estimated fetal weight is appropriate for gestational age.    None of the anomalies commonly detected by ultrasound were evident in the limited fetal anatomic survey described above.    The umbilical vein was traced in its entire intra-abdominal course. There is no visible dilation. At one point there is an adjacent vessel with clearly different flow that without  color appears to be a dilated segment, but when color Doppler is used they can be clearly distinguished from each other.    Normal amniotic fluid volume.           RECENT LABS:   ECG:   ECHO:       Anesthesia Evaluation     . Pt has had prior anesthetic.     No history of anesthetic complications          ROS/MED HX    ENT/Pulmonary:      (-) tobacco use   Neurologic:     (+)other neuro Ataxia    Cardiovascular:         METS/Exercise Tolerance:     Hematologic:         Musculoskeletal:         GI/Hepatic:         Renal/Genitourinary:         Endo:     (+) Obesity, .      Psychiatric:     (+) psychiatric history depression      Infectious Disease:         Malignancy:         Other:    (+) Possibly pregnant                                         Anesthesia Plan      History & Physical Review  History and physical reviewed and following examination; no interval change.    ASA Status:  2 emergent.  OB Epidural Asa: 2       Plan for Spinal   PONV prophylaxis:  Ondansetron (or other 5HT-3)  Epidural currently without any  discernable level to ice. Decision made to pull epidural and perform spinal anesthetic in OR.  Discussed with patient who was in agreement.      Postoperative Care  Postoperative pain management:  Neuraxial analgesia and Multi-modal analgesia.      Consents  Anesthetic plan, risks, benefits and alternatives discussed with:  Patient and Patient.  Use of blood products discussed: Yes.   Use of blood products discussed with Patient.  .                          .

## 2018-11-14 NOTE — ANESTHESIA PROCEDURE NOTES
Peripheral nerve/Neuraxial procedure note : intrathecal  Pre-Procedure  Performed by MARY AKHTAR  Location: OB      Pre-Anesthestic Checklist: patient identified, IV checked, site marked, risks and benefits discussed, informed consent, monitors and equipment checked, pre-op evaluation, at physician/surgeon's request and post-op pain management    Timeout  Correct Patient: Yes   Correct Procedure: Yes   Correct Site: Yes   Correct Laterality: Yes   Correct Position: Yes   Site Marked: Yes   .   Procedure Documentation    .    Procedure:    Intrathecal.  Insertion Site:L2-3  (midline approach)      Patient Prep;povidone-iodine 7.5% surgical scrub.  .  Needle: Mackenzie tip Spinal Needle (gauge): 25  Spinal/LP Needle Length (inches): 3 # of attempts: 1 and # of redirects:  Introducer used Introducer: 20 G .       Assessment/Narrative  Paresthesias: No.  .  .  clear CSF fluid removed while sitting   . : T2 bilaterally. Comments:  Patient sitting on edge of OR bed, lower back cleaned and prepped in sterile fashion with betadine. 1% lido used to numb area. Introducer placed, spinal needle through introducer. Appropriate flow of CSF and confirmed with aspiration via syringe. Spinal dose given, 12 mg 0.75% bupivacaine, 200mcg morphine. No complications.

## 2018-11-14 NOTE — PLAN OF CARE
Pt transferred via bed to room 414 with infant in arms. Pt tolerated tx well. Report given to TATE Morales RN.

## 2018-11-14 NOTE — ANESTHESIA CARE TRANSFER NOTE
Patient: Rosemary Oconnor Frailriver    Procedure(s):   SECTION    Diagnosis: pregnancy  Diagnosis Additional Information: No value filed.    Anesthesia Type:   Epidural     Note:  Airway :Room Air  Patient transferred to:Labor and Delivery  Comments: VSS on Room Air. Talking and reports no pain. Report given to RN before transfer of pt care.Handoff Report: Identifed the Patient, Identified the Reponsible Provider, Reviewed the pertinent medical history, Discussed the surgical course, Reviewed Intra-OP anesthesia mangement and issues during anesthesia, Set expectations for post-procedure period and Allowed opportunity for questions and acknowledgement of understanding      Vitals: (Last set prior to Anesthesia Care Transfer)    CRNA VITALS  2018 0801 - 2018 0831      2018             Resp Rate (set): 10                Electronically Signed By: AMELIE Garcia CRNA  2018  8:31 AM

## 2018-11-14 NOTE — PROGRESS NOTES
[id number]: Patient uncomfortable. Wanting to sit up and stand more for pain management. Difficult to trace fetal heart rate while patient moving.Writer at bedside adjusting U/S frequently.

## 2018-11-14 NOTE — OP NOTE
Section Operative Note    Rosemary Davis  DOS: 2018  Place of delivery: [facility]    Pre-operative Diagnosis:  at 39w6d   Failure to progress, arrest of descent    Post-operative Diagnosis: Same    Procedure done: PLTCS    Surgeon: José Luis SIN    Assist: Circulator: Joana Chappell RN; Jaz Veras RN  Scrub Person: AbimaelAnita  Baby : Rosario Pleitez RN     Anesthesia: combined spinal-epidural    Complications:  None    EBL: 1000 ml    UOP-  175 ml clear    IV fluids- see anesthesia record    Drains- sanz catheter    Findings:  Infant male in cephalic position.  Weight 8lbs 15 ounces   APGARS- 9/9  3-V cord  Normal tubes and ovaries.    Indications:   Patient is a 28 year old  , who was admitted at 39w6d weeks pregnancy for IOL for elective indication with adequate bishops score.  She slowly progressed to 9 cm and then started having significant pain with contractions.  Attempt at pushing resulted in no decent and persistent cervix.    R/b/a were discussed and all questions were answered.     Procedure Details:  IV antibiotics given per protocol.  SCD placed for VTE prophylaxis.  Spinal anesthesia administered, checked and found to be adequate.  Sanz catheter was in place.  The patient was draped and prepped in the usual sterile manner in the dorsal postion with a left tilt. A Pfannenstiel incision was made and carried down through the subcutaneous tissue to the fascia. Fascial incision was made and extended transversely.The fascia was  from the underlying rectus tissue superiorly and inferiorly. The peritoneum was identified and entered bluntly. Peritoneal incision was extended with careful visualization of bladder.  The bladder blade was inserted.   The utero-vesical peritoneal reflection was opened sharply and extended latteraly.  The bladder blade was then replaced.   Transverse incision made in the lower uterine segment above the  bladder.  Infant  was delivered from the cephalic presentation. Nose and mouth suctioned at the abdominal wall. Rest of the infant delivered without complications. Umbilical cord was found to be wrapped once around the neck. The loops were loose. After the umbilical cord was clamped and cut cord blood sampled.  The placenta was allowed to separate and expelled spontaneously with membranes. Uterus was removed.  The uterus was well retracted.  Tubes and ovaries appeared normal.   The uterine incision was closed with running locked sutures of 0 Monocryl.   Second layer of sutures used to imbricate the initial layer.  Hemostasis was observed  Copious irrigation and suctioning of the peritoneal cavity was carried out. Para-colic gutters were cleared of all clots and debris.  Uterus was replaced into the abdomen.   Hysterotomy once again checked to ascertain hemostasis.  Peritoneum was closed with running 4-0 monocryl and the muscle layer approximated.   Interceed placed over the uterine incision   The fascia was closed with running sutures of 0 Vicryl.   Subcutaneous tissue approximated with plain catgut.  The skin was closed in a subcuticular fashion and dermabond.  Instrument, sponge, and needle counts were correct x 3   Patient and the baby were returned to the recovery room in a stable condition.    Geoffrey Trujillo MD

## 2018-11-14 NOTE — ANESTHESIA PROCEDURE NOTES
Peripheral nerve/Neuraxial procedure note : epidural catheter  Pre-Procedure  Performed by AURORA REYES  Location: OB      Pre-Anesthestic Checklist: patient identified, IV checked, site marked, risks and benefits discussed, informed consent, monitors and equipment checked, pre-op evaluation and at physician/surgeon's request    Timeout  Correct Patient: Yes   Correct Procedure: Yes   Correct Site: Yes   Correct Laterality: N/A   Correct Position: Yes   Site Marked: N/A   .   Procedure Documentation    .    Procedure:    Epidural catheter.  Insertion Site:L3-4  (midline approach) Injection technique: LORT saline   Local skin infiltrated with 3 mL of 1% lidocaine.  NOAM at 8 cm     Patient Prep;mask, sterile gloves, povidone-iodine 7.5% surgical scrub, patient draped.  .  Needle: Touhy needle Needle Gauge: 17.    Needle Length (Inches) 3.5  # of attempts: 2 and # of redirects:  .   Catheter: 19 G . .  Catheter threaded easily  .  12 cm at skin.   .    Assessment/Narrative  Paresthesias: No.  .  .  Aspiration negative for heme or CSF  . Test dose of 3 mL lidocaine 1.5% w/ 1:200,000 epinephrine at. Test dose negative for signs of intravascular, subdural or intrathecal injection. Comments:  No complications   Secured with Tegaderm, adhesive spray and tape

## 2018-11-14 NOTE — ANESTHESIA PREPROCEDURE EVALUATION
Procedure: Procedure(s):   SECTION  Preop diagnosis: pregnancy    Allergies   Allergen Reactions     Sulfa Drugs Nausea and Vomiting     Cold/shivering/conjunctival injection     Amoxicillin Hives     Past Medical History:   Diagnosis Date     Ataxia      Depressive disorder      Humerus fracture initial injury  2012    ORIF left humerus in 2012     Missed       Past Surgical History:   Procedure Laterality Date     DENTAL SURGERY      wisdom extraction      EYE SURGERY      muscle repair for wandering eye--bilat     OPEN REDUCTION INTERNAL FIXATION ELBOW  2012    Procedure...:OPEN REDUCTION INTERNAL FIXATION ELBOW; REPAIR OF NON-UNION, LEFT ELBOW ; Surgeon:BREE SADLER; Location: OR     OPEN REDUCTION INTERNAL FIXATION HUMERUS DISTAL  2012    Procedure:OPEN REDUCTION INTERNAL FIXATION HUMERUS DISTAL; ORIF LEFT DISTAL HUMERUS FRACTURE. SYNTHES ELBOW PLATES, MINI C-ARM; Surgeon:BREE SADLER; Location: OR     Social History   Substance Use Topics     Smoking status: Never Smoker     Smokeless tobacco: Never Used      Comment: socially only      Alcohol use No     Prior to Admission medications    Medication Sig Start Date End Date Taking? Authorizing Provider   acetaminophen (TYLENOL) 325 MG tablet Take 2 tablets by mouth every 4 hours as needed. 12  Yes Jabari Cleveland PA-C   citalopram (CELEXA) 20 MG tablet TAKE ONE TAB BY MOUTH ONCE DAILY  Patient taking differently: 10 mg TAKE ONE TAB BY MOUTH ONCE DAILY 18  Yes Albina Cano APRN CNP   Prenatal Vit-Fe Fumarate-FA (PRENATAL MULTIVITAMIN PLUS IRON) 27-0.8 MG TABS per tablet Take 1 tablet by mouth daily   Yes Reported, Patient   VITAMIN D, CHOLECALCIFEROL, PO Take 5,000 Units by mouth daily   Yes Reported, Patient   ibuprofen 200 MG capsule Take 400 mg by mouth every 4 hours as needed for fever. 12   Bree Sadler MD     Current Facility-Administered Medications Ordered in Epic  "  Medication Dose Route Frequency Last Rate Last Dose     acetaminophen (TYLENOL) tablet 650 mg  650 mg Oral Q4H PRN         azithromycin (ZITHROMAX) 500 mg in sodium chloride 0.9 % 250 mL intermittent infusion  500 mg Intravenous Pre-Op/Pre-procedure x 1 dose         carboprost (HEMABATE) injection 250 mcg  250 mcg Intramuscular Once PRN         clindamycin (CLEOCIN) infusion 900 mg  900 mg Intravenous Pre-Op/Pre-procedure x 1 dose         ePHEDrine injection 5 mg  5 mg Intravenous Q3 Min PRN         fentaNYL (PF) (SUBLIMAZE) injection 100 mcg  100 mcg EPIDURAL Once         fentaNYL (PF) (SUBLIMAZE) injection  mcg   mcg Intravenous Q1H PRN         fentaNYL (SUBLIMAZE) 2 mcg/mL, ropivacaine (NAROPIN) 0.2% in NaCl 0.9% EPIDURAL infusion   EPIDURAL Continuous 10 mL/hr at 11/13/18 2330       gentamicin (GARAMYCIN) 140 mg in sodium chloride 0.9 % 50 mL intermittent infusion  1.5 mg/kg Intravenous Pre-Op/Pre-procedure x 1 dose         ibuprofen (ADVIL/MOTRIN) tablet 800 mg  800 mg Oral Once PRN         lactated ringers BOLUS 1,000 mL  1,000 mL Intravenous Once         lactated ringers BOLUS 1,000 mL  1,000 mL Intravenous Once         lactated ringers BOLUS 250 mL  250 mL Intravenous Once PRN         lactated ringers BOLUS 500 mL  500 mL Intravenous Once PRN         lactated ringers infusion   Intravenous Continuous         lactated ringers infusion   Intravenous Continuous 125 mL/hr at 11/13/18 1927       lidocaine (LMX4) cream   Topical Q1H PRN         lidocaine 1 % 0.1-20 mL  0.1-20 mL Subcutaneous Once PRN         lidocaine 1 % 1 mL  1 mL Other Q1H PRN         lidocaine-EPINEPHrine 1.5 %-1:[id number] injection    PRN   3 mL at 11/13/18 1854     medication instruction   Does not apply Continuous PRN         medication instruction   Does not apply Continuous PRN         Medication Instructions: misoprostol (CYTOTEC)- Nurse to discuss ordering with provider, if needed. Ordered via \"OB misoprostol (CYTOTEC) " "Postpartum Hemorrhage PANEL\"   Does not apply Continuous PRN         methylergonovine (METHERGINE) injection 200 mcg  200 mcg Intramuscular Once PRN         nalbuphine (NUBAIN) injection 2.5-5 mg  2.5-5 mg Intravenous Q6H PRN         naloxone (NARCAN) injection 0.1-0.4 mg  0.1-0.4 mg Intravenous Q2 Min PRN         naloxone (NARCAN) injection 0.1-0.4 mg  0.1-0.4 mg Intravenous Q2 Min PRN         ondansetron (ZOFRAN) injection 4 mg  4 mg Intravenous Q6H PRN         Opioid plan postpartum - medication instruction   Does not apply Continuous PRN         oxyCODONE-acetaminophen (PERCOCET) 5-325 MG per tablet 1 tablet  1 tablet Oral Once PRN         oxytocin (PITOCIN) 30 units in 500 mL 0.9% NaCl infusion  100-340 mL/hr Intravenous Continuous PRN         oxytocin (PITOCIN) 30 units in 500 mL 0.9% NaCl infusion  1-24 natalie-units/min Intravenous Continuous 16 mL/hr at 11/14/18 0500 16 natalie-units/min at 11/14/18 0500     oxytocin (PITOCIN) injection 10 Units  10 Units Intramuscular Once PRN         oxytocin in 0.9% NaCl (PITOCIN) 30 units/500 mL infusion             sodium chloride (PF) 0.9% PF flush 3 mL  3 mL Intracatheter Q1H PRN         sodium chloride (PF) 0.9% PF flush 3 mL  3 mL Intracatheter Q8H         sodium citrate-citric acid (BICITRA) solution 30 mL  30 mL Oral Pre-Op/Pre-procedure x 1 dose         tranexamic acid (CYKLOKAPRON) Bolus 1g vial attach to NaCl 50 or 100 mL bag ADULT  1 g Intravenous Q30 Min PRN         vancomycin (VANCOCIN) 1000 mg in dextrose 5% 200 mL PREMIX  1,000 mg Intravenous Q12H 200 mL/hr at 11/13/18 2048 1,000 mg at 11/13/18 2048     No current Epic-ordered outpatient prescriptions on file.       fentaNYL-ropivacaine 10 mL/hr at 11/13/18 2330     lactated ringers       lactated ringers 125 mL/hr at 11/13/18 1927     - MEDICATION INSTRUCTIONS -       - MEDICATION INSTRUCTIONS -       - MEDICATION INSTRUCTIONS -       - MEDICATION INSTRUCTIONS -       oxytocin in 0.9% NaCl       oxytocin in " 0.9% NaCl 16 natalie-units/min (11/14/18 0500)     Wt Readings from Last 1 Encounters:   11/13/18 88.5 kg (195 lb)     Temp Readings from Last 1 Encounters:   11/14/18 38.2  C (100.8  F) (Temporal)     BP Readings from Last 6 Encounters:   11/14/18 109/62   11/08/18 122/82   09/20/17 108/74   10/27/15 94/58   10/20/15 112/74   04/28/15 118/82     Pulse Readings from Last 4 Encounters:   11/13/18 100   11/08/18 96   09/20/17 88   10/27/15 84     Resp Readings from Last 1 Encounters:   11/13/18 16     SpO2 Readings from Last 1 Encounters:   11/14/18 97%     Recent Labs   Lab Test  09/20/17   1347  10/17/12   1606  01/27/12   1800   NA   --   137  139   POTASSIUM   --   4.0  3.4   CHLORIDE   --   99  104   CO2   --    --   21   ANIONGAP   --    --   15   GLC  86  88  104*   BUN   --   13  28*  14   CR   --   0.47*  0.58   KATHRYN   --   10.0  9.2     Recent Labs   Lab Test  10/17/12   1606   AST  127*   ALT  178*   ALKPHOS  88   BILITOTAL  0.2     Recent Labs   Lab Test  11/13/18   0802  09/20/17   1353   WBC  8.0  9.4   HGB  11.5*  14.6   PLT  299  305     Recent Labs   Lab Test  11/13/18   0802   ABO  O   RH  Pos     No results for input(s): INR, PTT in the last 38904 hours.   No results for input(s): TROPI in the last 83848 hours.  No results for input(s): PH, PCO2, PO2, HCO3 in the last 49898 hours.  Recent Labs   Lab Test  05/25/12   1150   HCG  Negative     Recent Results (from the past 744 hour(s))   [facility] Comprehensive Single    Narrative            Comprehensive  ---------------------------------------------------------------------------------------------------------  Pat. Name: RAINA GRULLON       Study Date:  10/26/2018 10:54am  Pat. 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      METHOD  ---------------------------------------------------------------------------------------------------------  Transabdominal ultrasound examination. View: Suboptimal view: limited by maternal body habitus and limited by late gestational age.      PREGNANCY  ---------------------------------------------------------------------------------------------------------  Mahmood pregnancy. Number of fetuses: 1      DATING  ---------------------------------------------------------------------------------------------------------                                           Date                                Details                                                                                      Gest. age                      GARFIELD  LMP                                  2018                                                                                                                         34 w + 2 d                     2018  Prior assessment               2018                          GA: 11 w + 5 d                                                                           37 w + 1 d                     11/15/2018  U/S                                   10/26/2018                       based upon AC, BPD, Femur, HC                                                38 w + 1 d                     2018  Assigned dating                  Dating performed on 10/26/2018, based on the prior assessment (on 2018)                    37 w + 1 d                     11/15/2018      GENERAL  EVALUATION  ---------------------------------------------------------------------------------------------------------  Cardiac activity present.  bpm.  Fetal movements present.  Presentation cephalic.  Placenta posterior.  Umbilical cord 3 vessel cord, suboptimal placental cord insert .  Amniotic fluid Amount of AF: normal. MVP 5.6 cm. ESTRELLITA 17.2 cm. Q1 5.1 cm, Q2 5.6 cm, Q3 2.2 cm, Q4 4.3 cm.      FETAL BIOMETRY  ---------------------------------------------------------------------------------------------------------  Main Fetal Biometry:  BPD                                        93.1                    mm                         37w 6d                Hadlock  OFD                                        114.4                  mm                          35w 5d                Nicolaides  HC                                          333.2                  mm                          38w 0d                Hadlock  AC                                          364.9                  mm                          40w 3d                Hadlock  Femur                                      70.4                   mm                          36w 1d                Hadlock  Humerus                                  62.6                    mm                         36w 2d                Jacklyn  Fetal Weight Calculation:  EFW                                       3,636                  g                                     86%         Brandon  EFW (lb,oz)                             8 lb 0                  oz  EFW by                                        Hadlock (BPD-HC-AC-FL)  Head / Face / Neck Biometry:                                             2.8                     mm  Nasal bone                               9.7                     mm      FETAL ANATOMY  ---------------------------------------------------------------------------------------------------------  The following structures appear normal:  Head / Neck                          Cranium. Head size. Head shape. Lateral ventricles. Choroid plexus. Midline falx. Parenchyma. Thalami.                                             Neck.  Face                                   Lips. Profile. Nose. Orbits. Lens.  Heart / Thorax                      4-chamber view. RVOT view. LVOT view. Situs. Cardiac position. Cardiac size. Cardiac rhythm.                                             Right lung. Left lung. Diaphragm.  Abdomen                             Abdominal wall. Cord insertion. Stomach. Kidneys. Bladder. Liver. Bowel. Genitals.  Spine                                  Cervical spine. Thoracic spine. Lumbar spine. Sacral spine.    The following structures could not be adequately visualized:  Head / Neck                         Cavum septi pellucidi. Cerebellum. Cisterna magna. Vermis.  Face                                   Maxilla. Mandible.  Heart / Thorax                      Aortic arch view. Bicaval view. Ductal arch view. 3-vessel view. 3-vessel-trachea view.  Extremities / Skeleton          Right hand. Left hand. Right foot. Left foot.    Gender: male.      MATERNAL STRUCTURES  ---------------------------------------------------------------------------------------------------------  Cervix                                  Visualized                                             Appearance: Appears Closed  Right Ovary                          Visualized  Left Ovary                            Not visualized      RECOMMENDATION  ---------------------------------------------------------------------------------------------------------    We discussed the findings on today's ultrasound with the patient.    Return to primary provider for continued prenatal care.    No indication for early deliver ir increased surveillance.    Further ultrasound studies as clinically indicated.    Thank you for the opportunity to participate in the care of this patient. If you have questions  regarding today's evaluation or if we can be of further service, please contact the  [facility].    **Fetal anomalies may be present but not detected**        Impression    IMPRESSION  ---------------------------------------------------------------------------------------------------------    Patient here for a comprehensive fetal growth scan secondary to a diagnosis of UVV. She is at 37w1d gestational age.    Active single fetus with behavior appropriate for gestational age.    Appropriate interval fetal growth.    Estimated fetal weight is appropriate for gestational age.    None of the anomalies commonly detected by ultrasound were evident in the limited fetal anatomic survey described above.    The umbilical vein was traced in its entire intra-abdominal course. There is no visible dilation. At one point there is an adjacent vessel with clearly different flow that without  color appears to be a dilated segment, but when color Doppler is used they can be clearly distinguished from each other.    Normal amniotic fluid volume.           RECENT LABS:   ECG:   ECHO:       Anesthesia Evaluation     . Pt has had prior anesthetic.     No history of anesthetic complications          ROS/MED HX    ENT/Pulmonary:      (-) tobacco use   Neurologic:     (+)other neuro Ataxia    Cardiovascular:         METS/Exercise Tolerance:     Hematologic:         Musculoskeletal:         GI/Hepatic:         Renal/Genitourinary:         Endo:     (+) Obesity, .      Psychiatric:     (+) psychiatric history depression      Infectious Disease:         Malignancy:         Other:    (+) Possibly pregnant                                   Anesthesia Plan      History & Physical Review  History and physical reviewed and following examination; no interval change.    ASA Status:  2 emergent.  OB Epidural Asa: 2   NPO Status:  > 8 hours    Plan for Spinal   PONV prophylaxis:  Ondansetron (or other 5HT-3)  No current epidural  level with ice.  Suspect migrated catheter. Decision to pull epidural made and discussed with patient with plan for spinal in OR.      Postoperative Care  Postoperative pain management:  Neuraxial analgesia and Multi-modal analgesia.      Consents  Anesthetic plan, risks, benefits and alternatives discussed with:  Patient and Patient.  Use of blood products discussed: Yes.   Use of blood products discussed with Patient.  .                          .

## 2018-11-14 NOTE — ANESTHESIA POSTPROCEDURE EVALUATION
Patient: Rosemary Oconnor Frailriver    * No procedures listed *    Diagnosis:* No pre-op diagnosis entered *  Diagnosis Additional Information: No value filed.    Anesthesia Type:  Spinal    Note:  Anesthesia Post Evaluation    Patient location during evaluation: OB PACU  Patient participation: Able to fully participate in evaluation  Level of consciousness: awake and alert  Pain management: adequate  Airway patency: patent  Cardiovascular status: hemodynamically stable  Respiratory status: acceptable and room air  Hydration status: acceptable  PONV: none             Last vitals:  Vitals:    11/14/18 1352 11/14/18 1454 11/14/18 1609   BP: 115/73 128/81 110/68   Pulse:      Resp: 18 16 18   Temp: 36.5  C (97.7  F)  36.4  C (97.5  F)   SpO2: 98% 97%          Electronically Signed By: Saman Barton MD  November 14, 2018  4:12 PM

## 2018-11-14 NOTE — ANESTHESIA CARE TRANSFER NOTE
Patient: Rosemary Davis    * No procedures listed *    Diagnosis: * No pre-op diagnosis entered *  Diagnosis Additional Information: No value filed.    Anesthesia Type:   Spinal     Note:  Airway :Room Air  Patient transferred to:Labor and Delivery  Comments: VSS on Room Air. Talking and repots no pain. Report given to RN before transfer of pt care.Handoff Report: Identifed the Patient, Identified the Reponsible Provider, Reviewed the pertinent medical history, Discussed the surgical course, Reviewed Intra-OP anesthesia mangement and issues during anesthesia, Set expectations for post-procedure period and Allowed opportunity for questions and acknowledgement of understanding      Vitals: (Last set prior to Anesthesia Care Transfer)    CRNA VITALS  11/14/2018 0749 - 11/14/2018 0833      11/14/2018             Resp Rate (set): 10                Electronically Signed By: AMELIE Garcia CRNA  November 14, 2018  8:33 AM

## 2018-11-14 NOTE — ANESTHESIA PROCEDURE NOTES
Peripheral nerve/Neuraxial procedure note : intrathecal  Pre-Procedure  Performed by MARY AKHTAR  Location: OB      Pre-Anesthestic Checklist: patient identified, IV checked, site marked, risks and benefits discussed, informed consent, monitors and equipment checked, pre-op evaluation, at physician/surgeon's request and post-op pain management    Timeout  Correct Patient: Yes   Correct Procedure: Yes   Correct Site: Yes   Correct Laterality: Yes   Correct Position: Yes   Site Marked: Yes   .   Procedure Documentation    .    Procedure:    Intrathecal.  Insertion Site:L2-3  (midline approach)      Patient Prep;mask, sterile gloves, povidone-iodine 7.5% surgical scrub, patient draped.  .  Needle: Mackenzie tip Spinal Needle (gauge): 25  Spinal/LP Needle Length (inches): 3 # of attempts: 1 and # of redirects:  Introducer used Introducer: 20 G .       Assessment/Narrative  Paresthesias: No.  .  .  clear CSF fluid removed while sitting   . Comments:  Patient sitting on edge of OR bed, lower back cleaned and prepped in sterile fashion with betadine. 1% lido used to numb area. Introducer placed, spinal needle through introducer. Appropriate flow of CSF and confirmed with aspiration via syringe. Spinal dose given, 12 mg 0.75% bupivacaine, 200mcg morphine. No complications.

## 2018-11-15 LAB — HGB BLD-MCNC: 7.3 G/DL (ref 11.7–15.7)

## 2018-11-15 PROCEDURE — 85018 HEMOGLOBIN: CPT | Performed by: OBSTETRICS & GYNECOLOGY

## 2018-11-15 PROCEDURE — 12000037 ZZH R&B POSTPARTUM INTERMEDIATE

## 2018-11-15 PROCEDURE — 25000128 H RX IP 250 OP 636: Performed by: OBSTETRICS & GYNECOLOGY

## 2018-11-15 PROCEDURE — 25000132 ZZH RX MED GY IP 250 OP 250 PS 637: Performed by: OBSTETRICS & GYNECOLOGY

## 2018-11-15 PROCEDURE — 36415 COLL VENOUS BLD VENIPUNCTURE: CPT | Performed by: OBSTETRICS & GYNECOLOGY

## 2018-11-15 RX ADMIN — CITALOPRAM HYDROBROMIDE 20 MG: 20 TABLET ORAL at 10:16

## 2018-11-15 RX ADMIN — SENNOSIDES AND DOCUSATE SODIUM 2 TABLET: 8.6; 5 TABLET ORAL at 19:49

## 2018-11-15 RX ADMIN — KETOROLAC TROMETHAMINE 30 MG: 30 INJECTION, SOLUTION INTRAMUSCULAR at 04:33

## 2018-11-15 RX ADMIN — IBUPROFEN 800 MG: 400 TABLET ORAL at 17:32

## 2018-11-15 RX ADMIN — SENNOSIDES AND DOCUSATE SODIUM 2 TABLET: 8.6; 5 TABLET ORAL at 10:16

## 2018-11-15 RX ADMIN — ACETAMINOPHEN 975 MG: 325 TABLET, FILM COATED ORAL at 06:48

## 2018-11-15 RX ADMIN — ACETAMINOPHEN 975 MG: 325 TABLET, FILM COATED ORAL at 23:32

## 2018-11-15 RX ADMIN — IBUPROFEN 800 MG: 400 TABLET ORAL at 23:32

## 2018-11-15 RX ADMIN — ACETAMINOPHEN 975 MG: 325 TABLET, FILM COATED ORAL at 15:02

## 2018-11-15 RX ADMIN — IBUPROFEN 800 MG: 400 TABLET ORAL at 10:16

## 2018-11-15 NOTE — PLAN OF CARE
Problem: Patient Care Overview  Goal: Plan of Care/Patient Progress Review  Outcome: No Change  Vital signs stable. Nielson intact. Lung sounds clear. Pain under control with tylenol and toradol. Working on breast feeding baby on demand- using the nipple shield. Able to hand express small drops of colostrum. Encourage to call with questions or concerns. Will continue to monitor.

## 2018-11-15 NOTE — PLAN OF CARE
Problem: Patient Care Overview  Goal: Plan of Care/Patient Progress Review  Outcome: Improving  VSS. Incision C/D/I. IV running. Up to bathroom with assist, tolerated well, gait still unsteady but pt reports it more her norm.  Pain well controlled, requesting prn pain meds as needed.  Working on breastfeeding  and  cares.  On pathway. Continue to monitor and notify MD as needed.

## 2018-11-15 NOTE — PROVIDER NOTIFICATION
Call placed to Dr. Yu regarding AM Hgb. of 7.3. She stated no treatment needed as long as pt. is asymptomatic and VS are stable. Will recheck in AM. Pt. denies any dizziness when up in chair. Will continue to monitor.

## 2018-11-15 NOTE — PROGRESS NOTES
Rosemary Oconnor Frailing  November 15, 2018   POD1 s/p PLTCS    S: 28 year old  delivered at 39w6d   Doing well without complaints.  Sore but medication helping.   Lochia moderate.   Ambulating.  Urinating without issues.  Breastfeeding.    O: BP 98/64  Pulse 100  Temp 97.5  F (36.4  C) (Oral)  Resp 16  Ht 1.524 m (5')  Wt 88.5 kg (195 lb)  SpO2 99%  Breastfeeding? Unknown  BMI 38.08 kg/m2  Gen: NAD  Abd: soft, NT, ND, FF, incision c/d/i with dermabond  Ext: NT, nonedematous    A/P:  28 year old  POD1 s/p PLTCS  - ibuprofen, acetaminophen, oxycodone PRN pain  - support breastfeeding  - monitor lochia  - encourage ambulation  - regular diet  - incision appropriate  - routine PP care    Fallon Yu MD  Text Page (8am - 5pm)

## 2018-11-15 NOTE — LACTATION NOTE
Initial Lactation visit.  Recommend unlimited, frequent breast feedings: At least 8 - 12 times every 24 hours. Avoid pacifiers and supplementation with formula unless medically indicated. Explained benefits of holding baby skin on skin to help promote better breastfeeding outcomes.  Assisted with feeding.  Infant was feeding well on R side at time of visit.  Using shield. RN said that Rosemary had not wanted to use the shield but when they tried baby could not stay latched.  Nipples are flat and shield needed.  Infant had a good coordinated suck with audible swallowing.  Visible colostrum in shield.   When infant came off the breast Rosemary was unable to get him latched back on.  Worked with her on hand positioning and shield placement as she was allowing the shield to slip and be off center without her nipple in.  Rosemary required a full assist to get infant latched.  Showed Rosemary the football hold.  Infant fed well.  She did a little better with football positioning.  Discussed importance of correct shield placement and encouraged Rosemary to use it when feeding.  She had no questions today.    Will continue to follow.  Racheal Maciel  RN, IBCLC       Will revisit as needed.    Racheal Maciel RN, IBCLC

## 2018-11-16 PROCEDURE — 12000037 ZZH R&B POSTPARTUM INTERMEDIATE

## 2018-11-16 PROCEDURE — 25000132 ZZH RX MED GY IP 250 OP 250 PS 637: Performed by: OBSTETRICS & GYNECOLOGY

## 2018-11-16 RX ADMIN — ACETAMINOPHEN 975 MG: 325 TABLET, FILM COATED ORAL at 07:52

## 2018-11-16 RX ADMIN — SENNOSIDES AND DOCUSATE SODIUM 1 TABLET: 8.6; 5 TABLET ORAL at 07:52

## 2018-11-16 RX ADMIN — IBUPROFEN 800 MG: 400 TABLET ORAL at 05:40

## 2018-11-16 RX ADMIN — IBUPROFEN 800 MG: 400 TABLET ORAL at 23:20

## 2018-11-16 RX ADMIN — ACETAMINOPHEN 975 MG: 325 TABLET, FILM COATED ORAL at 15:34

## 2018-11-16 RX ADMIN — ACETAMINOPHEN 975 MG: 325 TABLET, FILM COATED ORAL at 23:20

## 2018-11-16 RX ADMIN — IBUPROFEN 800 MG: 400 TABLET ORAL at 11:35

## 2018-11-16 RX ADMIN — SENNOSIDES AND DOCUSATE SODIUM 1 TABLET: 8.6; 5 TABLET ORAL at 21:13

## 2018-11-16 RX ADMIN — CITALOPRAM HYDROBROMIDE 20 MG: 20 TABLET ORAL at 07:51

## 2018-11-16 RX ADMIN — IBUPROFEN 800 MG: 400 TABLET ORAL at 17:39

## 2018-11-16 NOTE — LACTATION NOTE
Follow up visit. Rosemary states infant is breastfeeding well with a shield but he is at a 9% weight loss. She started pumping and supplementing with any ebm she gets. Discussed expected milk production. Rosemary denies questions or concerns at this time. Encouraged her to continue calling staff for latch checks and assist with feedings as needed. Rosemary appreciative of my visit. Will revisit as needed.

## 2018-11-16 NOTE — PLAN OF CARE
Problem: Patient Care Overview  Goal: Plan of Care/Patient Progress Review  Outcome: Improving  VSS.  Incision C/D/I.  Up to bathroom independently, tolerated well.  Pain well controlled, requesting prn pain meds as needed.  Pt declined oxycodone.  Encouraged pt to walk in the hallway 2-3 times today.  Working on breastfeeding  and  cares. Mother pumped after each breastfeeding. Mother's in-law was supportive and helpful at bed side.   went home to rest. On pathway. Continue to monitor and notify MD as needed.

## 2018-11-16 NOTE — PROGRESS NOTES
Rosemary Oconnor Frailing  November 16, 2018    S: pt is doing well.  Tolerating po intake and pain is well controlled.  Ambulating.  Decreasing lochia. Breast feeding.    O:/82  Pulse 100  Temp 98  F (36.7  C) (Axillary)  Resp 18  Ht 1.524 m (5')  Wt 88.5 kg (195 lb)  SpO2 100%  Breastfeeding? Unknown  BMI 38.08 kg/m2    Recent Labs  Lab 11/15/18  0830 11/13/18  0802   HGB 7.3* 11.5*     Abdomen: soft, non distended, fundus firm below the umbilicus.  Incision is C/D/I  Ext: non tender, no edema or erythema    A/P: s/p PLTCS POD #2  Doing well  Continue care  Discharge planning for tomorrow    Geoffrey Trujillo

## 2018-11-16 NOTE — PLAN OF CARE
Problem: Patient Care Overview  Goal: Plan of Care/Patient Progress Review  Outcome: Improving  VSS. Voiding without difficulty. Scant vaginal bleeding. Incision WDL. Pain controlled with ibuprofen and tylenol. Breastfeeding infant with shield and pumping d/t infants weight loss.  at bedside, supportive and involved in cares. Will continue to monitor.

## 2018-11-17 VITALS
HEART RATE: 100 BPM | TEMPERATURE: 97.8 F | OXYGEN SATURATION: 100 % | SYSTOLIC BLOOD PRESSURE: 125 MMHG | HEIGHT: 60 IN | WEIGHT: 195 LBS | DIASTOLIC BLOOD PRESSURE: 81 MMHG | BODY MASS INDEX: 38.28 KG/M2 | RESPIRATION RATE: 16 BRPM

## 2018-11-17 PROCEDURE — 25000132 ZZH RX MED GY IP 250 OP 250 PS 637: Performed by: OBSTETRICS & GYNECOLOGY

## 2018-11-17 RX ORDER — OXYCODONE HYDROCHLORIDE 5 MG/1
5-10 TABLET ORAL
Qty: 10 TABLET | Refills: 0 | Status: SHIPPED | OUTPATIENT
Start: 2018-11-17 | End: 2020-12-30

## 2018-11-17 RX ORDER — FERROUS SULFATE 325(65) MG
325 TABLET ORAL 2 TIMES DAILY
Qty: 60 TABLET | Refills: 2 | Status: SHIPPED | OUTPATIENT
Start: 2018-11-17 | End: 2021-12-15

## 2018-11-17 RX ADMIN — SENNOSIDES AND DOCUSATE SODIUM 1 TABLET: 8.6; 5 TABLET ORAL at 08:42

## 2018-11-17 RX ADMIN — IBUPROFEN 800 MG: 400 TABLET ORAL at 12:00

## 2018-11-17 RX ADMIN — CITALOPRAM HYDROBROMIDE 20 MG: 20 TABLET ORAL at 08:42

## 2018-11-17 RX ADMIN — IBUPROFEN 800 MG: 400 TABLET ORAL at 05:34

## 2018-11-17 RX ADMIN — ACETAMINOPHEN 975 MG: 325 TABLET, FILM COATED ORAL at 08:42

## 2018-11-17 NOTE — PLAN OF CARE
Problem: Patient Care Overview  Goal: Plan of Care/Patient Progress Review  Outcome: Improving  Pt is looking forward to discharge to home.  Continues to monitor Pt.

## 2018-11-17 NOTE — DISCHARGE SUMMARY
[facility] Discharge Summary    Rosemary Davis MRN# [id number]   Age: 28 year old YOB: 1990     Date of Admission:  2018  Date of Discharge::  2018  Admitting Physician:  Geoffrey Trujillo MD  Discharge Physician:  Geoffrey Trujillo MD               Admission Diagnoses:   pregnancy  Indication for care in labor or delivery  pregnancy  S/P           Discharge Diagnosis:   Failure to progress  Primary  section          Procedures:   Procedure(s): Primary low transverse  section       No other procedures performed during this admission           Medications Prior to Admission:     Prescriptions Prior to Admission   Medication Sig Dispense Refill Last Dose     acetaminophen (TYLENOL) 325 MG tablet Take 2 tablets by mouth every 4 hours as needed. 250 tablet  Past Month at Unknown time     citalopram (CELEXA) 20 MG tablet TAKE ONE TAB BY MOUTH ONCE DAILY (Patient taking differently: 10 mg TAKE ONE TAB BY MOUTH ONCE DAILY) 90 tablet 3 2018 at Unknown time     Prenatal Vit-Fe Fumarate-FA (PRENATAL MULTIVITAMIN PLUS IRON) 27-0.8 MG TABS per tablet Take 1 tablet by mouth daily   2018 at Unknown time     VITAMIN D, CHOLECALCIFEROL, PO Take 5,000 Units by mouth daily   2018 at Unknown time     ibuprofen 200 MG capsule Take 400 mg by mouth every 4 hours as needed for fever. 100 capsule 0 More than a month at Unknown time             Discharge Medications:     Current Discharge Medication List      START taking these medications    Details   ferrous sulfate (IRON) 325 (65 Fe) MG tablet Take 1 tablet (325 mg) by mouth 2 times daily  Qty: 60 tablet, Refills: 2    Associated Diagnoses: S/P       oxyCODONE IR (ROXICODONE) 5 MG tablet Take 1-2 tablets (5-10 mg) by mouth every 3 hours as needed  Qty: 10 tablet, Refills: 0    Associated Diagnoses: S/P          CONTINUE these medications which have NOT CHANGED    Details   acetaminophen  (TYLENOL) 325 MG tablet Take 2 tablets by mouth every 4 hours as needed.  Qty: 250 tablet    Associated Diagnoses: Pain; Supracondylar fracture of humerus, closed      citalopram (CELEXA) 20 MG tablet TAKE ONE TAB BY MOUTH ONCE DAILY  Qty: 90 tablet, Refills: 3    Associated Diagnoses: Recurrent major depressive disorder, in full remission (H)      Prenatal Vit-Fe Fumarate-FA (PRENATAL MULTIVITAMIN PLUS IRON) 27-0.8 MG TABS per tablet Take 1 tablet by mouth daily      VITAMIN D, CHOLECALCIFEROL, PO Take 5,000 Units by mouth daily      ibuprofen 200 MG capsule Take 400 mg by mouth every 4 hours as needed for fever.  Qty: 100 capsule, Refills: 0    Comments: Use  400 to 600 mg  q  6 hrs prn  Pain  Or inbetween pain pills  Associated Diagnoses: Supracondylar fracture of humerus, closed; Nonunion of fracture; Pain                   Consultations:   No consultations were requested during this admission          Brief History of Labor or Admission:   IOL with failure to progress           Hospital Course:   The patient's hospital course was unremarkable.  She recovered as anticipated and experienced no post-operative complications.  On discharge, her pain was well controlled. Vaginal bleeding is similar to peak menstrual flow.  Voiding without difficulty.  Ambulating well and tolerating a normal diet.  No fever or significant wound drainage.  Breastfeeding well.  Infant is stable.  No bowel movement yet.  She was discharged on post-partum day #3.    Post-partum hemoglobin:   Hemoglobin   Date Value Ref Range Status   11/15/2018 7.3 (L) 11.7 - 15.7 g/dL Final             Discharge Instructions and Follow-Up:   Discharge diet: Regular   Discharge activity: No lifting, driving, or strenuous exercise for 6 week(s)  No heavy lifting, pushing, pulling for 6 week(s)  No sex for 6 week(s)   Discharge follow-up: Follow up with me in 6 weeks   Wound care: Ice to area for comfort  Keep wound clean and dry           Discharge  Disposition:   Discharged to home      Attestation:  Amount of time performed on this discharge summary: 10 minutes.    Geoffrey Trujillo MD

## 2018-11-17 NOTE — PROGRESS NOTES
Rosemary Oconnor Frailing  November 17, 2018    S: pt is doing well.  Tolerating po intake and pain is well controlled.  Ambulating.  Decreasing lochia. Breast and Bottle feeding.    O:/82  Pulse 100  Temp 97.6  F (36.4  C) (Oral)  Resp 16  Ht 1.524 m (5')  Wt 88.5 kg (195 lb)  SpO2 100%  Breastfeeding? Unknown  BMI 38.08 kg/m2    Recent Labs  Lab 11/15/18  0830 11/13/18  0802   HGB 7.3* 11.5*     Abdomen: soft, non distended, fundus firm below the umbilicus.  Incision is C/D/I  Ext: non tender, no edema or erythema    A/P: s/p PLTCS POD #3  ABL anemia- oral iron  Doing well  Continue care  Discharge planning for today  Geoffrey Trujillo

## 2018-11-17 NOTE — PLAN OF CARE
Problem: Patient Care Overview  Goal: Plan of Care/Patient Progress Review  Outcome: Improving  Data: Vital signs within normal limits. Postpartum checks within normal limits - see flow record. Patient eating and drinking normally. Patient able to empty bladder independently and is up ambulating. No apparent signs of infection. Incision healing well. Patient performing self cares and is able to care for infant.  Action: Patient medicated during the shift for pain. See MAR. Patient reassessed within 1 hour after each medication and pain was improved - patient stated she was comfortable. Patient education done. See flow record.  Response: Positive attachment behaviors observed with infant. Support persons is present.   Plan: Anticipate discharge on 11/17.

## 2018-11-19 ENCOUNTER — DOCUMENTATION ONLY (OUTPATIENT)
Dept: CARE COORDINATION | Facility: CLINIC | Age: 28
End: 2018-11-19

## 2018-11-19 NOTE — PROGRESS NOTES
[facility] will be sharing updates with you on Maternal Child Health Referral requests for home care services.  This is for care coordination purposes and alert you to referral status.  We received the referral for  Rosemary Davis; MRN [id number] and want to update you:      [facility] has made two attempts to contact patient by phone and text message over the last two days.   We have not had any response from patient.  Final message was left advising patient to follow up with Primary Care Providers for mom and baby.      Sincerely [facility]  Yajaira Khan  [phone]

## 2018-11-24 NOTE — ANESTHESIA PREPROCEDURE EVALUATION
Anesthesia Evaluation     .             ROS/MED HX    ENT/Pulmonary:  - neg pulmonary ROS    (-) asthma, sleep apnea and recent URI   Neurologic:  - neg neurologic ROS    (-) seizures   Cardiovascular:  - neg cardiovascular ROS      (-) hypertension   METS/Exercise Tolerance:  >4 METS   Hematologic:  - neg hematologic  ROS       Musculoskeletal:  - neg musculoskeletal ROS       GI/Hepatic:  - neg GI/hepatic ROS   (+) GERD       Renal/Genitourinary:  - ROS Renal section negative       Endo:  - neg endo ROS       Psychiatric:  - neg psychiatric ROS   (+) psychiatric history depression      Infectious Disease:  - neg infectious disease ROS       Malignancy:      - no malignancy   Other:                                    Anesthesia Plan      History & Physical Review  History and physical reviewed and following examination; no interval change.    ASA Status:  2 .  OB Epidural Asa: 2       Plan for Epidural   PONV prophylaxis:  Ondansetron (or other 5HT-3)       Postoperative Care  Postoperative pain management:  Multi-modal analgesia.      Consents  Anesthetic plan, risks, benefits and alternatives discussed with:  Patient..                          .

## 2018-12-17 ASSESSMENT — ENCOUNTER SYMPTOMS: SEIZURES: 0

## 2019-02-08 ENCOUNTER — MEDICAL CORRESPONDENCE (OUTPATIENT)
Dept: HEALTH INFORMATION MANAGEMENT | Facility: CLINIC | Age: 29
End: 2019-02-08
Payer: COMMERCIAL

## 2019-06-25 ENCOUNTER — TELEPHONE (OUTPATIENT)
Dept: NEUROLOGY | Facility: CLINIC | Age: 29
End: 2019-06-25

## 2019-06-25 NOTE — TELEPHONE ENCOUNTER
[facility] Call Center    Phone Message    May a detailed message be left on voicemail: yes    Reason for Call: Other: Suyapa calling to request a call back. She has some questions on the [facility]. Please call her back to discuss.      Action Taken: Message routed to:  [facility] vinay neuro

## 2019-06-25 NOTE — TELEPHONE ENCOUNTER
[facility] Call Center    Phone Message    May a detailed message be left on voicemail: yes    Reason for Call: Other: Rosemary calling to state that she would like to schedule appt. Please call her back to schedule.      Action Taken: Message routed to:  [facility] vinay neuro

## 2019-06-25 NOTE — TELEPHONE ENCOUNTER
Spoke to pt's mom. She was asking a lot of questions. Her daughter has difficulty walking, has nystagmus and now has a new baby. She was adopted from [facility] so they know nothing about her family. She will share all the info with her daughter about the ataxia clinic, how to make an appointment and the websites of [facility] and [employer] with her daughter and have her call this writer if she decides she wants to make an appointment,.

## 2019-07-02 ENCOUNTER — TELEPHONE (OUTPATIENT)
Dept: NEUROLOGY | Facility: CLINIC | Age: 29
End: 2019-07-02

## 2019-07-02 NOTE — TELEPHONE ENCOUNTER
[facility] Call Center    Phone Message    May a detailed message be left on voicemail: yes    Reason for Call: Other: Pt returning Vanita Harris phone call, please call pt at number provided anytime     Action Taken: Message routed to:  [facility] Neurology

## 2019-07-02 NOTE — TELEPHONE ENCOUNTER
Called pt, no answer, message was left.    Pt return called. An appointment was made for Oct 4 at 9:30 with Dr. Wood. She is adopted and doesn't know anything about her biological parents. She doesn't know if she has any biological siblings. She has problems with ambulation and speech. She has not had an MRI since she was 1 years old. Will check with Dr. Wood to see if he wants her to have a scan before her appointment.

## 2019-07-02 NOTE — TELEPHONE ENCOUNTER
Called pt, no answer, message was left for her to return phone call to help arrange for her to be seen in the ataxia clinic.

## 2019-07-02 NOTE — TELEPHONE ENCOUNTER
[facility] Call Center    Phone Message    May a detailed message be left on voicemail: yes    Reason for Call: Other: Per call from PT is returning calls to [facility].  Attempted to contact [facility] via [employer]. Please reach out to the PT.      Action Taken: Message routed to:  [facility] Neurology

## 2019-07-24 NOTE — TELEPHONE ENCOUNTER
RECORDS RECEIVED FROM: Self referral   DATE RECEIVED: 10/4/19   NOTES (FOR ALL VISITS) STATUS DETAILS   OFFICE NOTE from referring provider N/A    OFFICE NOTE from other specialist N/A    DISCHARGE SUMMARY from hospital N/A    DISCHARGE REPORT from the ER N/A    OPERATIVE REPORT N/A    MEDICATION LIST N/A    IMAGING  (FOR ALL VISITS)     EMG N/A    EEG N/A    ECT N/A    MRI (HEAD, NECK, SPINE) N/A    LUMBAR PUNCTURE N/A    EJ Scan N/A    CT (HEAD, NECK, SPINE) N/A       Action    Action Taken Per 7/2/19 encounter, patient does not have outside neurology records nor scans.            No

## 2019-09-03 ENCOUNTER — TELEPHONE (OUTPATIENT)
Dept: NEUROLOGY | Facility: CLINIC | Age: 29
End: 2019-09-03

## 2019-09-03 NOTE — TELEPHONE ENCOUNTER
Received medical records sent from pt's mom. Pt was adopted so very little is known about her family. She did have an MRI done at [facility] in Oct. 2012. Called that clinic and they are going to push the scans to [place]. Pt will be seen on Oct. 4 by Dr. RONALD Wood.

## 2019-10-04 ENCOUNTER — OFFICE VISIT (OUTPATIENT)
Dept: NEUROLOGY | Facility: CLINIC | Age: 29
End: 2019-10-04
Payer: COMMERCIAL

## 2019-10-04 ENCOUNTER — PRE VISIT (OUTPATIENT)
Dept: NEUROLOGY | Facility: CLINIC | Age: 29
End: 2019-10-04

## 2019-10-04 VITALS
SYSTOLIC BLOOD PRESSURE: 121 MMHG | HEART RATE: 100 BPM | RESPIRATION RATE: 18 BRPM | BODY MASS INDEX: 33.18 KG/M2 | DIASTOLIC BLOOD PRESSURE: 66 MMHG | HEIGHT: 60 IN | TEMPERATURE: 98.3 F | WEIGHT: 169 LBS | OXYGEN SATURATION: 96 %

## 2019-10-04 DIAGNOSIS — R27.0 ATAXIA: Primary | ICD-10-CM

## 2019-10-04 DIAGNOSIS — R27.0 ATAXIA: ICD-10-CM

## 2019-10-04 LAB
AFP SERPL-MCNC: <1.5 UG/L (ref 0–8)
FOLATE SERPL-MCNC: 13.6 NG/ML
VIT B12 SERPL-MCNC: 631 PG/ML (ref 193–986)

## 2019-10-04 RX ORDER — ISOTRETINOIN 30 MG/1
CAPSULE, LIQUID FILLED ORAL
Refills: 0 | COMMUNITY
Start: 2019-09-30 | End: 2021-12-15

## 2019-10-04 ASSESSMENT — ENCOUNTER SYMPTOMS
DIZZINESS: 1
HEADACHES: 0
LOSS OF CONSCIOUSNESS: 0
NUMBNESS: 0
MEMORY LOSS: 0
TREMORS: 0
TINGLING: 0
WEAKNESS: 1
SEIZURES: 0
SPEECH CHANGE: 0
PARALYSIS: 0
DISTURBANCES IN COORDINATION: 1

## 2019-10-04 ASSESSMENT — PAIN SCALES - GENERAL: PAINLEVEL: NO PAIN (0)

## 2019-10-04 ASSESSMENT — MIFFLIN-ST. JEOR: SCORE: 1403.08

## 2019-10-04 NOTE — LETTER
10/4/2019       RE: Rosemary Davis  [address] [address]     Dear Colleague,    Thank you for referring your patient, Rosemary Davis, to the [facility] NEUROLOGY at [facility]. Please see a copy of my visit note below.    Department of Neurology  Movement Disorders Division     Patient: Rosemary Davis   MRN: [id number]   : 1990   Date of Visit: 10/4/2019     Reason for visit:   Referring Physician:     History of Present Illness  Ms. Davis is a 29 year old ***R/L handed female with PMH of *** that presents to Neurology Ataxia clinci  as a new patient for evaluation of ***    Nystagmus at 2 months old  1 year fluctuating Ptosis , tested for   Imaging studies right hemisphere schizencephaly .  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   Patient reports    Review of Systems:  ***  Other than that mentioned above, the remainder of 12 systems reviewed were negative.    Medications:  Current Outpatient Medications   Medication Sig Dispense Refill     citalopram (CELEXA) 20 MG tablet TAKE ONE TAB BY MOUTH ONCE DAILY (Patient taking differently: 10 mg TAKE ONE TAB BY MOUTH ONCE DAILY) 90 tablet 3     MYORISAN 30 MG capsule TAKE 1 CAPSULE BY MOUTH TWICE A DAY  0     acetaminophen (TYLENOL) 325 MG tablet Take 2 tablets by mouth every 4 hours as needed. (Patient not taking: Reported on 10/4/2019) 250 tablet      ferrous sulfate (IRON) 325 (65 Fe) MG tablet Take 1 tablet (325 mg) by mouth 2 times daily (Patient not taking: Reported on  10/4/2019) 60 tablet 2     ibuprofen 200 MG capsule Take 400 mg by mouth every 4 hours as needed for fever. (Patient not taking: Reported on 10/4/2019) 100 capsule 0     oxyCODONE IR (ROXICODONE) 5 MG tablet Take 1-2 tablets (5-10 mg) by mouth every 3 hours as needed (Patient not taking: Reported on 10/4/2019) 10 tablet 0     Prenatal Vit-Fe Fumarate-FA (PRENATAL MULTIVITAMIN PLUS IRON) 27-0.8 MG TABS per tablet Take 1 tablet by mouth daily       VITAMIN D, CHOLECALCIFEROL, PO Take 5,000 Units by mouth daily           Physical Exam:  The patient's  height is 1.524 m (5') and weight is 76.7 kg (169 lb). Her oral temperature is 98.3  F (36.8  C). Her blood pressure is 121/66 and her pulse is 100. Her respiration is 18 and oxygen saturation is 96%.    Alert  and oriented, answers questions appropriately.    Gaze evoked Nystagmus in lateral and vertical  Tone is normal   DTR UE 2+, LE 1+  Dysmetria on FTN L>R  Ataxic , wide based gait  Unable to to tandem gait, unable to stand with heel to feet       Data Reviewed:   ***    Impression:  Rosemary Davis is a 29 year old female with ***     Plan:   -   B12, foalte, e, AFP, TTE, TPO, KARMEN, thyroid AB    RTC:     Nubia Dixon MD   Movement Disorders Fellow    Patient seen and discussed with  ***      Summary of orders placed this encounter:  No orders of the defined types were placed in this encounter.                Department of Neurology  Movement Disorders Division     Patient: Rosemary Davis   MRN: [id number]   : 1990   Date of Visit: 10/4/2019     Reason for visit: ataxia  Referring Physician:     History of Present Illness  Ms. 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 History obtained from patient and her mother. We  reviewed available records from [facility], Dr Boykin from 1989 when Rosemary was evaluated for nystagmus and ptosis . She was seen at [facility] in [place], [place] and congenital myasthenia has been ruled out.    We reviewed records form [facility][1] Dr. Singer was 06/2010 when Rosemary was evalauted for alternating esotropia with nystagmus and surgical correction . The nystagmus was thought to be dampened with improvement on vision and indeed improved with wearing contacts.  She underwent eye surgery 2012 with improvement of the nystagmus but unfortunately she was noted to have gait abnormality.    She is denying double vision, no swallowing problems , she is unsteady with walking with falls, last fall was 4 weeks ago, she falls every couple of months, , gait has been the same for the last 7 years. She was a cheerleader in High School and she did not have any gait problems until 2012.  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      Review of Systems:  Other than that mentioned above, the remainder of 12 systems reviewed were negative.    Medications:  Current Outpatient Medications   Medication Sig Dispense Refill     citalopram (CELEXA) 20 MG tablet TAKE ONE TAB BY MOUTH ONCE DAILY (Patient taking differently: 10 mg TAKE ONE TAB BY MOUTH ONCE DAILY) 90 tablet 3     MYORISAN 30 MG capsule TAKE 1 CAPSULE BY MOUTH TWICE A DAY  0     acetaminophen (TYLENOL) 325 MG tablet Take 2 tablets by mouth every 4 hours as needed. (Patient not taking: Reported on 10/4/2019) 250 tablet      ferrous sulfate (IRON) 325 (65 Fe) MG tablet Take 1 tablet (325 mg) by mouth 2 times daily (Patient not taking: Reported on 10/4/2019) 60 tablet 2     ibuprofen 200 MG capsule Take 400 mg by mouth every 4 hours as needed for fever. (Patient not taking: Reported on 10/4/2019) 100 capsule 0     oxyCODONE IR (ROXICODONE) 5 MG tablet Take 1-2 tablets (5-10 mg) by mouth every 3 hours  as needed (Patient not taking: Reported on 10/4/2019) 10 tablet 0     Prenatal Vit-Fe Fumarate-FA (PRENATAL MULTIVITAMIN PLUS IRON) 27-0.8 MG TABS per tablet Take 1 tablet by mouth daily       VITAMIN D, CHOLECALCIFEROL, PO Take 5,000 Units by mouth daily           Physical Exam:  The patient's  height is 1.524 m (5') and weight is 76.7 kg (169 lb). Her oral temperature is 98.3  F (36.8  C). Her blood pressure is 121/66 and her pulse is 100. Her respiration is 18 and oxygen saturation is 96%.    Alert  and oriented, answers questions appropriately.  CN: gaze evoked nystagmus more prominent on lateral gaze, gaze apraxia can not be excluded, face is symmetric at rest and with activation, tongue protrudes to midline, no palatal tremor.  Tone is normal, no abnormal movements . Strength is 5/5 in all extremities.  Sensation is intact to light touch, PP, vibration ( > 10 sec in big toes) and proprioception are intact. Dysmetria on FTN L>R.  Plantar reflexes down going.  Gait is wide based, ataxic, unable to do tandem gait , unable to stand with one foot on front of the other not even for a few seconds.    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  We reviewed MRI images and there is no cerebellar atrophy .  At this point the cause of her ataxia remains unknown.     Plan:   1. Will start evaluation for reversible causes of ataxia ( vitamin B12, folate, vitamin E, alpha fetoprotein, thyroid globulin Ab, thyroid peroxidase Ab, TTG , KARMEN)  2. Patient is hesitant in genetic testings , will think and will get back with genetic counselor .  3. Discussed about safety, no falls .      Nubia Dixon MD    Movement Disorders Fellow    Patient seen and discussed with .      Summary of orders placed this encounter:  No orders of the defined types were placed in this encounter.                I have personally interviewed and examined Ms. Riley Anastasia Frailing and agree with diagnosis and management. The total time spent with the patient was  25 minutes, over 50% of the time spent in counseling and coordinating care.      Again, thank you for allowing me to participate in the care of your patient.      Sincerely,    Suha Wood MD

## 2019-10-04 NOTE — NURSING NOTE
Pt states she does not have headaches. She said she does not have double or blurred vision. She has had an eye exam in the past couple of years.She said she does not choke when she eats or drinks. She said she fell 7 years ago and fracture her left elbow and has had two surgeries on that area. She does not have problems with bowel or bladder habits. She said she falls once every couple of months. She was last in physical therapy in 2012. She did not work on exercises on balance. Pt states she sleeps ok, it takes her a while but she does sleep maybe 7 or 8 hours per night. She said she has an 11 month old and sometimes has to get up with her son. She believes she falls because she looses her balance and her legs sometimes give out. Pt's mom can tell a change in hand writing and also a change in speech.

## 2019-10-04 NOTE — PROGRESS NOTES
I have personally interviewed and examined Ms. Riley Anastasia Frailing and agree with diagnosis and management. The total time spent with the patient was  25 minutes, over 50% of the time spent in counseling and coordinating care.

## 2019-10-04 NOTE — LETTER
"10/4/2019       RE: Rosemary Davis  [address] [address]     Dear Colleague,    Thank you for referring your patient, Rosemary Davis, to the [facility] NEUROLOGY at [facility]. Please see a copy of my visit note below.    GENETIC COUNSELING-Ataxia clinic  I met with Rosemary \"Pari\" and her mother for 30 minutes in the ataxia clinic at [facility]. We met to review her history of ataxia and discuss genetic testing options    MEDICAL HISTORY-  Rosemary has a long standing history of developmental delay. 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 More recently in the past 5-7 years, she has developed gait imbalance and ataxia.    FAMILY HISTORY  Rosemary is adopted and has not information about her family medical history. She does have a healthy 1-year-old son.    GENETIC COUNSELING-  We discussed the genetic and environmental basis of movement disorders. I explained that in most cases, they result from complex and lifelong interaction of multiple genetic and environmental factors. In some rare cases, there may be a single gene cause.  I explained that for Rosemary, genetic testing may identify a precise genetic basis for her findings, which may help to guide our medical management and would also allow us to determine potential risks to her son and any future children. Given that she is adopted, we have no way of assessing whether or not her condition is autosomal dominant- which is an inheritance pattern that could place her son at a 50% risk.  Rosemary indicated that she would like to consider genetic testing before making a decision.    Jermaine Iyer MS, [facility]  Licensed Genetic Counselor    "

## 2019-10-04 NOTE — PROGRESS NOTES
Department of Neurology  Movement Disorders Division     Patient: Rosemary Davis   MRN: [id number]   : 1990   Date of Visit: 10/4/2019     Reason for visit: ataxia  Referring Physician:     History of Present Illness  Ms. 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 History obtained from patient and her  mother. Rosemary was adopted at birth from and little is known about her biological parents, her mother was from [facility].    We reviewed available records from [facility], Dr Boykin from  when Rosemary was evaluated for nystagmus and ptosis. She was seen at [facility] in [place], [place] and congenital myasthenia has been ruled out.    Also we reviewed records form [facility] Dr. Singer  2010 when Rosemary was evalauted for alternating esotropia with nystagmus and surgical correction . The nystagmus was thought to be dampened with improvement on vision and indeed improved with wearing contacts.    She underwent eye surgery  with improvement of the nystagmus . Around the same time she  was noted to have gait abnormality.    She is denying double vision, no swallowing problems , she is unsteady with walking with falls, last fall was 4 weeks ago, she falls every couple of months, , gait has been the same for the last 7 years. 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      Review of Systems:  Other than that mentioned above, the remainder of 12 systems reviewed were negative.    Medications:  Current Outpatient Medications   Medication Sig Dispense Refill     citalopram (CELEXA) 20 MG tablet TAKE ONE TAB BY MOUTH ONCE DAILY (Patient taking  differently: 10 mg TAKE ONE TAB BY MOUTH ONCE DAILY) 90 tablet 3     MYORISAN 30 MG capsule TAKE 1 CAPSULE BY MOUTH TWICE A DAY  0     acetaminophen (TYLENOL) 325 MG tablet Take 2 tablets by mouth every 4 hours as needed. (Patient not taking: Reported on 10/4/2019) 250 tablet      ferrous sulfate (IRON) 325 (65 Fe) MG tablet Take 1 tablet (325 mg) by mouth 2 times daily (Patient not taking: Reported on 10/4/2019) 60 tablet 2     ibuprofen 200 MG capsule Take 400 mg by mouth every 4 hours as needed for fever. (Patient not taking: Reported on 10/4/2019) 100 capsule 0     oxyCODONE IR (ROXICODONE) 5 MG tablet Take 1-2 tablets (5-10 mg) by mouth every 3 hours as needed (Patient not taking: Reported on 10/4/2019) 10 tablet 0     Prenatal Vit-Fe Fumarate-FA (PRENATAL MULTIVITAMIN PLUS IRON) 27-0.8 MG TABS per tablet Take 1 tablet by mouth daily       VITAMIN D, CHOLECALCIFEROL, PO Take 5,000 Units by mouth daily           Physical Exam:  The patient's  height is 1.524 m (5') and weight is 76.7 kg (169 lb). Her oral temperature is 98.3  F (36.8  C). Her blood pressure is 121/66 and her pulse is 100. Her respiration is 18 and oxygen saturation is 96%.    Alert  and oriented, answers questions appropriately.  CN: gaze evoked nystagmus more prominent on lateral gaze, gaze apraxia can not be excluded, face is symmetric at rest and with activation, tongue protrudes to midline, no palatal tremor.  Tone is normal, no abnormal movements . Strength is 5/5 in all extremities.  Sensation is intact to light touch, PP, vibration ( > 10 sec in big toes) and proprioception are intact. Dysmetria on FTN L>R.  Plantar reflexes down going.  Gait is wide based, ataxic, not able to walk in  tandem gait , not able  to stand with feet together in the tandem stance not even for a few seconds.    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  We reviewed MRI images and there is no cerebellar atrophy .  At this point the cause of her ataxia remains unknown.     Plan:   1. Will start evaluation for reversible causes of ataxia ( vitamin B12, folate, vitamin E, alpha fetoprotein, thyroid globulin Ab, thyroid peroxidase Ab, TTG , KARMEN)  2. Patient is hesitant in genetic testings , will think and will get back with genetic counselor .  3. Discussed about safety, no falls .      Nubia Dixon MD   Movement Disorders Fellow    Patient seen and discussed with .      Summary of orders placed this encounter:  No orders of the defined types were placed in this encounter.

## 2019-10-04 NOTE — LETTER
10/4/2019      RE: Rosemary Davis  [address] [facility] [address] [facility] [address]       Department of Neurology  Movement Disorders Division     Patient: Rosemary Davis   MRN: [id number]   : 1990   Date of Visit: 10/4/2019     Reason for visit: ataxia  Referring Physician:     History of Present Illness  Ms. 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 History obtained from patient and her  mother. Rosemary was adopted at birth from and little is known about her biological parents, her mother was from [facility].    We reviewed available records from [facility], Dr Boykin from  when Rosemary was evaluated for nystagmus and ptosis. She was seen at [facility] in [place], [place] and congenital myasthenia has been ruled out.    Also we reviewed records form [facility] Dr. Singer  2010 when Rosemary was evalauted for alternating esotropia with nystagmus and surgical correction . The nystagmus was thought to be dampened with improvement on vision and indeed improved with wearing contacts.    She underwent eye surgery  with improvement of the nystagmus . Around the same time she  was noted to have gait abnormality.    She is denying double vision, no swallowing problems , she is unsteady with walking with falls, last fall was 4 weeks ago, she falls every couple of months, , gait has been the same for the last 7 years. 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      Review of Systems:  Other than that mentioned above, the remainder of 12 systems reviewed were negative.    Medications:  Current Outpatient Medications   Medication Sig Dispense  Refill     citalopram (CELEXA) 20 MG tablet TAKE ONE TAB BY MOUTH ONCE DAILY (Patient taking differently: 10 mg TAKE ONE TAB BY MOUTH ONCE DAILY) 90 tablet 3     MYORISAN 30 MG capsule TAKE 1 CAPSULE BY MOUTH TWICE A DAY  0     acetaminophen (TYLENOL) 325 MG tablet Take 2 tablets by mouth every 4 hours as needed. (Patient not taking: Reported on 10/4/2019) 250 tablet      ferrous sulfate (IRON) 325 (65 Fe) MG tablet Take 1 tablet (325 mg) by mouth 2 times daily (Patient not taking: Reported on 10/4/2019) 60 tablet 2     ibuprofen 200 MG capsule Take 400 mg by mouth every 4 hours as needed for fever. (Patient not taking: Reported on 10/4/2019) 100 capsule 0     oxyCODONE IR (ROXICODONE) 5 MG tablet Take 1-2 tablets (5-10 mg) by mouth every 3 hours as needed (Patient not taking: Reported on 10/4/2019) 10 tablet 0     Prenatal Vit-Fe Fumarate-FA (PRENATAL MULTIVITAMIN PLUS IRON) 27-0.8 MG TABS per tablet Take 1 tablet by mouth daily       VITAMIN D, CHOLECALCIFEROL, PO Take 5,000 Units by mouth daily           Physical Exam:  The patient's  height is 1.524 m (5') and weight is 76.7 kg (169 lb). Her oral temperature is 98.3  F (36.8  C). Her blood pressure is 121/66 and her pulse is 100. Her respiration is 18 and oxygen saturation is 96%.    Alert  and oriented, answers questions appropriately.  CN: gaze evoked nystagmus more prominent on lateral gaze, gaze apraxia can not be excluded, face is symmetric at rest and with activation, tongue protrudes to midline, no palatal tremor.  Tone is normal, no abnormal movements . Strength is 5/5 in all extremities.  Sensation is intact to light touch, PP, vibration ( > 10 sec in big toes) and proprioception are intact. Dysmetria on FTN L>R.  Plantar reflexes down going.  Gait is wide based, ataxic, not able to walk in  tandem gait , not able  to stand with feet together in the tandem stance not even for a few seconds.    Data Reviewed:   MRI brain, images reviewed: cortical  dysplasia with thickening of the gray matter, right lateral frontal schizencephaly , absence of the ventricular septum.    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  We reviewed MRI images and there is no cerebellar atrophy .  At this point the cause of her ataxia remains unknown.     Plan:   1. Will start evaluation for reversible causes of ataxia ( vitamin B12, folate, vitamin E, alpha fetoprotein, thyroid globulin Ab, thyroid peroxidase Ab, TTG , KARMEN)  2. Patient is hesitant in genetic testings , will think and will get back with genetic counselor .  3. Discussed about safety, no falls .    Nubia Dixon MD   Movement Disorders Fellow    Patient seen and discussed with .    Summary of orders placed this encounter:  No orders of the defined types were placed in this encounter.    I have personally interviewed and examined Ms. Rosemary Davis and agree with diagnosis and management. The total time spent with the patient was  25 minutes, over 50% of the time spent in counseling and coordinating care.    Suha Wood MD

## 2019-10-06 LAB — GAD65 AB SER IA-ACNC: <5 IU/ML (ref 0–5)

## 2019-10-07 LAB
A-TOCOPHEROL VIT E SERPL-MCNC: 11.2 MG/L (ref 5.5–18)
BETA+GAMMA TOCOPHEROL SERPL-MCNC: 3.1 MG/L (ref 0–6)
THYROGLOB AB SERPL IA-ACNC: <20 IU/ML (ref 0–40)
THYROGLOB SERPL-MCNC: 12.6 NG/ML
THYROPEROXIDASE AB SERPL-ACNC: <10 IU/ML
TTG IGA SER-ACNC: 1 U/ML
TTG IGG SER-ACNC: <1 U/ML

## 2019-10-07 NOTE — PROGRESS NOTES
"GENETIC COUNSELING-Ataxia clinic  I met with Rosemary \"Pari\" and her mother for 30 minutes in the ataxia clinic at [facility]. We met to review her history of ataxia and discuss genetic testing options    MEDICAL HISTORY-  Rosemary has a long standing history of developmental delay. 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 More recently in the past 5-7 years, she has developed gait imbalance and ataxia.    FAMILY HISTORY  Rosemary is adopted and has not information about her family medical history. She does have a healthy 1-year-old son.    GENETIC COUNSELING-  We discussed the genetic and environmental basis of movement disorders. I explained that in most cases, they result from complex and lifelong interaction of multiple genetic and environmental factors. In some rare cases, there may be a single gene cause.  I explained that for Rosemary, genetic testing may identify a precise genetic basis for her findings, which may help to guide our medical management and would also allow us to determine potential risks to her son and any future children. Given that she is adopted, we have no way of assessing whether or not her condition is autosomal dominant- which is an inheritance pattern that could place her son at a 50% risk.  Rosemary indicated that she would like to consider genetic testing before making a decision.    Jermaine Iyer MS, [facility]  Licensed Genetic Counselor  "

## 2019-10-10 ENCOUNTER — MEDICAL CORRESPONDENCE (OUTPATIENT)
Dept: HEALTH INFORMATION MANAGEMENT | Facility: CLINIC | Age: 29
End: 2019-10-10

## 2019-10-16 ENCOUNTER — TELEPHONE (OUTPATIENT)
Dept: NEUROLOGY | Facility: CLINIC | Age: 29
End: 2019-10-16

## 2019-10-16 NOTE — TELEPHONE ENCOUNTER
Health Call Center    Phone Message    May a detailed message be left on voicemail: yes    Reason for Call: Other: pt would like to know what the next step for her should be, whether or not she should be scheduling an appointment. please f/u w/ pt.     Action Taken: Message routed to:  [facility]  neuro      Called pt and gave her the information from Dr. Dixon. She said her dad is in the hospital at this time and she will discuss this with her mom and call this writer back if there are changes or if she decides to have testing done. She has already had physical therapy so does not need to go back at this time.

## 2019-11-08 ENCOUNTER — HEALTH MAINTENANCE LETTER (OUTPATIENT)
Age: 29
End: 2019-11-08

## 2019-11-22 DIAGNOSIS — R52 PAIN: ICD-10-CM

## 2019-11-22 DIAGNOSIS — S42.413A: ICD-10-CM

## 2019-11-22 DIAGNOSIS — F33.42 RECURRENT MAJOR DEPRESSIVE DISORDER, IN FULL REMISSION (H): ICD-10-CM

## 2019-11-22 RX ORDER — CITALOPRAM HYDROBROMIDE 20 MG/1
TABLET ORAL
Qty: 30 TABLET | Refills: 0 | Status: SHIPPED | OUTPATIENT
Start: 2019-11-22 | End: 2019-11-25

## 2019-11-22 NOTE — TELEPHONE ENCOUNTER
Patient scheduled for depression medication check 11/25/19 with Isamar Graves CNP. Patient is requesting refill citalopram at this time. Per Isamar Graves CNP refill #[id number] sent to pharmacy. Kellie Bourgeois

## 2019-11-25 ENCOUNTER — OFFICE VISIT (OUTPATIENT)
Dept: FAMILY MEDICINE | Facility: CLINIC | Age: 29
End: 2019-11-25

## 2019-11-25 VITALS
WEIGHT: 171 LBS | BODY MASS INDEX: 33.57 KG/M2 | HEART RATE: 88 BPM | OXYGEN SATURATION: 98 % | RESPIRATION RATE: 16 BRPM | DIASTOLIC BLOOD PRESSURE: 68 MMHG | SYSTOLIC BLOOD PRESSURE: 98 MMHG | HEIGHT: 60 IN

## 2019-11-25 DIAGNOSIS — F33.42 RECURRENT MAJOR DEPRESSIVE DISORDER, IN FULL REMISSION (H): Primary | ICD-10-CM

## 2019-11-25 PROCEDURE — 99213 OFFICE O/P EST LOW 20 MIN: CPT | Performed by: FAMILY MEDICINE

## 2019-11-25 RX ORDER — CITALOPRAM HYDROBROMIDE 20 MG/1
TABLET ORAL
Qty: 90 TABLET | Refills: 3 | Status: SHIPPED | OUTPATIENT
Start: 2019-11-25 | End: 2020-12-22

## 2019-11-25 ASSESSMENT — ANXIETY QUESTIONNAIRES
1. FEELING NERVOUS, ANXIOUS, OR ON EDGE: SEVERAL DAYS
6. BECOMING EASILY ANNOYED OR IRRITABLE: MORE THAN HALF THE DAYS
5. BEING SO RESTLESS THAT IT IS HARD TO SIT STILL: NOT AT ALL
7. FEELING AFRAID AS IF SOMETHING AWFUL MIGHT HAPPEN: NOT AT ALL
GAD7 TOTAL SCORE: 6
IF YOU CHECKED OFF ANY PROBLEMS ON THIS QUESTIONNAIRE, HOW DIFFICULT HAVE THESE PROBLEMS MADE IT FOR YOU TO DO YOUR WORK, TAKE CARE OF THINGS AT HOME, OR GET ALONG WITH OTHER PEOPLE: SOMEWHAT DIFFICULT
3. WORRYING TOO MUCH ABOUT DIFFERENT THINGS: MORE THAN HALF THE DAYS
2. NOT BEING ABLE TO STOP OR CONTROL WORRYING: NOT AT ALL

## 2019-11-25 ASSESSMENT — PATIENT HEALTH QUESTIONNAIRE - PHQ9
SUM OF ALL RESPONSES TO PHQ QUESTIONS 1-9: 3
5. POOR APPETITE OR OVEREATING: SEVERAL DAYS

## 2019-11-25 ASSESSMENT — MIFFLIN-ST. JEOR: SCORE: 1412.15

## 2019-11-25 NOTE — PATIENT INSTRUCTIONS
Patient Education     Citalopram tablets  Brand Name: Celexa  What is this medicine?  CITALOPRAM (sye HALI oh pram) is a medicine for depression.  How should I use this medicine?  Take this medicine by mouth with a glass of water. Follow the directions on the prescription label. You can take it with or without food. Take your medicine at regular intervals. Do not take your medicine more often than directed. Do not stop taking this medicine suddenly except upon the advice of your doctor. Stopping this medicine too quickly may cause serious side effects or your condition may worsen.  A special MedGuide will be given to you by the pharmacist with each prescription and refill. Be sure to read this information carefully each time.  Talk to your pediatrician regarding the use of this medicine in children. Special care may be needed.  Patients over 60 years old may have a stronger reaction and need a smaller dose.  What side effects may I notice from receiving this medicine?  Side effects that you should report to your doctor or health care professional as soon as possible:    allergic reactions like skin rash, itching or hives, swelling of the face, lips, or tongue    anxious    black, tarry stools    breathing problems    changes in vision    chest pain    confusion    elevated mood, decreased need for sleep, racing thoughts, impulsive behavior    eye pain    fast, irregular heartbeat    feeling faint or lightheaded, falls    feeling agitated, angry, or irritable    hallucination, loss of contact with reality    loss of balance or coordination    loss of memory    painful or prolonged erections    restlessness, pacing, inability to keep still    seizures    stiff muscles    suicidal thoughts or other mood changes    trouble sleeping    unusual bleeding or bruising    unusually weak or tired    vomiting  Side effects that usually do not require medical attention (report to your doctor or health care professional if they  continue or are bothersome):    change in appetite or weight    change in sex drive or performance    dizziness    headache    increased sweating    indigestion, nausea    tremors  What may interact with this medicine?  Do not take this medicine with any of the following medications:    certain medicines for fungal infections like fluconazole, itraconazole, ketoconazole, posaconazole, voriconazole    cisapride    dofetilide    dronedarone    escitalopram    linezolid    MAOIs like Carbex, Eldepryl, Marplan, Nardil, and Parnate    methylene blue (injected into a vein)    pimozide    thioridazine    ziprasidone  This medicine may also interact with the following medications:    alcohol    amphetamines    aspirin and aspirin-like medicines    carbamazepine    certain medicines for depression, anxiety, or psychotic disturbances    certain medicines for infections like chloroquine, clarithromycin, erythromycin, furazolidone, isoniazid, pentamidine    certain medicines for migraine headaches like almotriptan, eletriptan, frovatriptan, naratriptan, rizatriptan, sumatriptan, zolmitriptan    certain medicines for sleep    certain medicines that treat or prevent blood clots like dalteparin, enoxaparin, warfarin    cimetidine    diuretics    fentanyl    lithium    methadone    metoprolol    NSAIDs, medicines for pain and inflammation, like ibuprofen or naproxen    omeprazole    other medicines that prolong the QT interval (cause an abnormal heart rhythm)    procarbazine    rasagiline    supplements like Rittman's wort, kava kava, valerian    tramadol    tryptophan  What if I miss a dose?  If you miss a dose, take it as soon as you can. If it is almost time for your next dose, take only that dose. Do not take double or extra doses.  Where should I keep my medicine?  Keep out of reach of children.  Store at room temperature between 15 and 30 degrees C (59 and 86 degrees F). Throw away any unused medicine after the expiration  date.  What should I tell my health care provider before I take this medicine?  They need to know if you have any of these conditions:    bleeding disorders    bipolar disorder or a family history of bipolar disorder    glaucoma    heart disease    history of irregular heartbeat    kidney disease    liver disease    low levels of magnesium or potassium in the blood    receiving electroconvulsive therapy    seizures    suicidal thoughts, plans, or attempt; a previous suicide attempt by you or a family member    take medicines that treat or prevent blood clots    thyroid disease    an unusual or allergic reaction to citalopram, escitalopram, other medicines, foods, dyes, or preservatives    pregnant or trying to become pregnant    breast-feeding  What should I watch for while using this medicine?  Tell your doctor if your symptoms do not get better or if they get worse. Visit your doctor or health care professional for regular checks on your progress. Because it may take several weeks to see the full effects of this medicine, it is important to continue your treatment as prescribed by your doctor.  Patients and their families should watch out for new or worsening thoughts of suicide or depression. Also watch out for sudden changes in feelings such as feeling anxious, agitated, panicky, irritable, hostile, aggressive, impulsive, severely restless, overly excited and hyperactive, or not being able to sleep. If this happens, especially at the beginning of treatment or after a change in dose, call your health care professional.  You may get drowsy or dizzy. Do not drive, use machinery, or do anything that needs mental alertness until you know how this medicine affects you. Do not stand or sit up quickly, especially if you are an older patient. This reduces the risk of dizzy or fainting spells. Alcohol may interfere with the effect of this medicine. Avoid alcoholic drinks.  Your mouth may get dry. Chewing sugarless gum or  sucking hard candy, and drinking plenty of water will help. Contact your doctor if the problem does not go away or is severe.  NOTE:This sheet is a summary. It may not cover all possible information. If you have questions about this medicine, talk to your doctor, pharmacist, or health care provider. Copyright  2019 [employer]

## 2019-11-25 NOTE — PROGRESS NOTES
Problem(s) Oriented visit        SUBJECTIVE:                                                    Rosemary Davis is a 29 year old female who presents to clinic today for the following health issues :  Follow up for anxiety, which is currently well managed with citalopram 20 mg. Rosemary reports she would like to reduce the dose of her medication because her symptoms are well controlled and her previous provider suggested she try to wean off of the medication. Rosemary reports liable mood when she is not taking the medication.   Depression and Anxiety Follow-Up    How are you doing with your depression since your last visit? Improved    How are you doing with your anxiety since your last visit?  Improved     Are you having other symptoms that might be associated with depression or anxiety? No    Have you had a significant life event? OTHER: Father has been in hospital     Do you have any concerns with your use of alcohol or other drugs? No      Problem list, Medication list, Allergies, and Medical/Social/Surgical histories reviewed in EPIC and updated as appropriate.   Additional history: as documented    ROS:  General:  NEGATIVE for fever, chills, change in weight CV:  NEGATIVE for chest pain, palpitations or peripheral edema Resp:  NEGATIVE for significant cough or SOB Musculoskeletal:  No significant muscle or joint pains  Neurologic: POSITIVE for:, balance problems, coordination, gait disturbance Psychiatric: POSITIVE for:, anxiety    Histories:   Patient Active Problem List   Diagnosis     Nonunion of fracture     Ataxia     Depressive disorder     Missed      Humerus fracture     Health Care Home     Recurrent major depressive disorder, in full remission (H)     Indication for care in labor or delivery     S/P      Past Surgical History:   Procedure Laterality Date      SECTION N/A 2018    Procedure:  SECTION;  Surgeon: Geoffrey Trujillo MD;  Location: [facility]  SURGERY      wisdom extraction      EYE SURGERY      muscle repair for wandering eye--bilat     OPEN REDUCTION INTERNAL FIXATION ELBOW  5/25/2012    Procedure...:OPEN REDUCTION INTERNAL FIXATION ELBOW; REPAIR OF NON-UNION, LEFT ELBOW ; Surgeon:BREE NAVARRETE; Location: OR     OPEN REDUCTION INTERNAL FIXATION HUMERUS DISTAL  1/28/2012    Procedure:OPEN REDUCTION INTERNAL FIXATION HUMERUS DISTAL; ORIF LEFT DISTAL HUMERUS FRACTURE. SYNTHES ELBOW PLATES, MINI C-ARM; Surgeon:BREE NAVARRETE; Location: OR       Social History     Tobacco Use     Smoking status: Never Smoker     Smokeless tobacco: Never Used     Tobacco comment: socially only    Substance Use Topics     Alcohol use: No     Family History   Problem Relation Age of Onset     Glaucoma No family hx of      Macular Degeneration No family hx of            OBJECTIVE:                                                    BP 98/68   Pulse 88   Resp 16   Ht 1.524 m (5')   Wt 77.6 kg (171 lb)   SpO2 98%   BMI 33.40 kg/m    Body mass index is 33.4 kg/m .   GENERAL: healthy, alert and no distress  EYES: Eyes grossly normal to inspection, PERRL and conjunctivae and sclerae normal  RESP: lungs clear to auscultation - no rales, rhonchi or wheezes  CV: regular rate and rhythm, normal S1 S2, no S3 or S4, no murmur, click or rub, no peripheral edema and peripheral pulses strong  PSYCH: mentation appears normal, affect normal/bright     ASSESSMENT/PLAN:                                                        Diagnoses and all orders for this visit:    Recurrent major depressive disorder, in full remission (H)  -     citalopram (CELEXA) 20 MG tablet; TAKE ONE TAB BY MOUTH ONCE DAILY    I advise you continue to take the medication as it is currently prescribed, as it is helping with your symptoms; however, if you are planning to reduce the dose to 10 mg please call or return to clinic for any symptoms of anxiety or depression, call 911 or go to the nearest hospital  emergency department if you are feeling suicidal or homicidal. If you have an increase in symptoms please resume your previous dose.      ASSESSMENT/PLAN:       The following health maintenance items are reviewed in Epic and correct as of today:  Health Maintenance   Topic Date Due     PREVENTIVE CARE VISIT  1988     DEPRESSION ACTION PLAN  1988     HPV  01/19/2009     DTAP/TDAP/TD IMMUNIZATION (8 - Td) 06/07/2016     PHQ-9  05/08/2019     INFLUENZA VACCINE (1) 09/01/2019     PAP  02/13/2020     ADVANCE CARE PLANNING  11/14/2023     HIV SCREENING  Completed     IPV IMMUNIZATION  Completed     MENINGITIS IMMUNIZATION  Completed       Isamar Graves NP  [facility]  Family Practice  [facility]  [phone]    For any issues my office # is [phone]

## 2019-11-26 ASSESSMENT — ANXIETY QUESTIONNAIRES: GAD7 TOTAL SCORE: 6

## 2020-01-24 DIAGNOSIS — R27.0 ATAXIA: Primary | ICD-10-CM

## 2020-02-23 ENCOUNTER — HEALTH MAINTENANCE LETTER (OUTPATIENT)
Age: 30
End: 2020-02-23

## 2020-03-04 ENCOUNTER — HOSPITAL ENCOUNTER (OUTPATIENT)
Dept: PHYSICAL THERAPY | Facility: CLINIC | Age: 30
Setting detail: THERAPIES SERIES
End: 2020-03-04
Attending: PSYCHIATRY & NEUROLOGY
Payer: COMMERCIAL

## 2020-03-04 PROCEDURE — 97112 NEUROMUSCULAR REEDUCATION: CPT | Mod: GP | Performed by: PHYSICAL THERAPIST

## 2020-03-04 PROCEDURE — 97110 THERAPEUTIC EXERCISES: CPT | Mod: GP | Performed by: PHYSICAL THERAPIST

## 2020-03-04 PROCEDURE — 97163 PT EVAL HIGH COMPLEX 45 MIN: CPT | Mod: GP | Performed by: PHYSICAL THERAPIST

## 2020-03-04 ASSESSMENT — 6 MINUTE WALK TEST (6MWT): TOTAL DISTANCE WALKED (FT): 1450

## 2020-03-04 NOTE — PROGRESS NOTES
"   03/04/20 1300   Quick Adds   Type of Visit Initial OP PT Evaluation   General Information   Start of Care Date 03/04/20   Referring Physician Suha Wood MD   Orders Evaluate and Treat as Indicated   Order Date 01/30/20   Medical Diagnosis Ataxia R27.0     Onset of illness/injury or Date of Surgery 01/30/20   Precautions/Limitations fall precautions   Surgical/Medical history reviewed Yes   Pertinent history of current problem (include personal factors and/or comorbidities that impact the POC) Rosemary Davis is a 29 yof with Esotropia, Nystagmus , s/p eye surgery, Global developmental delay , Schizencephaly and 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  She is most afraid of falling while holding her son.   Pertinent Visual History  Esotropia, Nystagmus , s/p eye surgery in 2012   Prior level of function comment independent with ataxic gait   Patient role/Employment history Employed   Living environment House/[facility]   Home/Community Accessibility Comments mother drives her to work and  drove her to her appointment.  She has stairs that she states that she struggles on.  She has a 16month old son named Jann.   Patient/Family Goals Statement to stop falling so much and to increase her confidence around mobility   General Information Comments She is working in retail at a shoe store and states difficulty bending to pick boxes up off the floor   Fall Risk Screen   Fall screen completed by PT   Have you fallen 2 or more times in the past year? Yes   Have you fallen and had an injury in the past year? Yes   Timed Up and Go score (seconds) 10.84   Is patient a fall risk? Yes   Fall screen comments Pt falls at least one time per month, she \"falls hard\", has a black eye from a fall last week and has chipped a tooth with a fall   Pain   Patient currently in pain Yes   Pain location back, hips feel tight, B knees   Cognitive Status Examination   Orientation orientation to person, place and time "   Strength   Strength Comments hips 3+/5, knees 4/5, ankles 4+/5   Bed Mobility   Bed Mobility Comments increased time and effort noted while turing over on mat during exercises   Transfer Skills   Transfer Comments able to perform sit to stand from standard chair w/o UE assist, some ataxic movements noted   Gait   Gait Comments Pt ambulates with a WBOS, decreased trunk mobility, ataxic LE movements that worsen with increased speed or effort   Gait Special Tests Functional Gait Assessment Score out of 30   Score out of 30 15/30   Gait Special Tests Six Minute Walk Test   Feet 1450 Feet   Comments consistant speed and pattern noted t/o   Balance Special Tests Sit to Stand Reps in 30 Seconds   Reps in 30 seconds 11   Height standard chair   Comments some ataxic LE movements noted   Coordination   Coordination Comments impaired coordination of L UE and L>R LE with finger to nose to finger and heel to shin   Planned Therapy Interventions   Planned Therapy Interventions balance training;gait training;bed mobility training;neuromuscular re-education;motor coordination training;strengthening;stretching;transfer training;manual therapy   Clinical Impression   Criteria for Skilled Therapeutic Interventions Met yes, treatment indicated   PT Diagnosis Impaired ambulation, transfers, and fall risk   Influenced by the following impairments decreased coordination, decreased strength, decreased activity tolerance   Functional limitations due to impairments fear of falling with son, Fall Risk   Clinical Presentation Evolving/Changing   Clinical Presentation Rationale Pt notes changes in condition over last 7 years that have continued to today with regards to ataxia and decreased strength   Clinical Decision Making (Complexity) High complexity   Therapy Frequency 2 times/Week   Predicted Duration of Therapy Intervention (days/wks) 6weeks   Risk & Benefits of therapy have been explained Yes   Patient, Family & other staff in  agreement with plan of care Yes   Clinical Impression Comments Pt is a FALL RISK.  She has an ataxic gait pattern of unkown origin that has progressively worsened over the past 7 years.  She is a mother of a 16 month old and her biggest fear is falling while carrying him.  She requires LE strengthening, balance training, gait training, and coordination interventions.   Education Assessment   Preferred Learning Style Listening;Reading;Demonstration   Goal 1   Goal Identifier 30sec sit to stand   Goal Description Pt will perform 15 sit to stand transfers in 30sec in order to demonstrate an improvement in strength.   Target Date 05/03/20   Goal 2   Goal Identifier FGA   Goal Description Pt will score a >22/30 in order to demonstrate a decrease in fall risk.   Target Date 05/03/20   Goal 3   Goal Identifier 6MWT   Goal Description Pt will walk for 1600' in order to demonstrate an increase in activity tolerance.   Target Date 05/03/20   Goal 4   Goal Identifier FALLS   Goal Description Pt will report being fall free for 45 days in order to safely perform ADLs,  responsibilities, and tasks at her job including carrying boxes and climbing ladders to reach inventory.   Target Date 05/03/20   Total Evaluation Time   PT Yuliya High Complexity Minutes ([id number]) 35

## 2020-04-09 ENCOUNTER — HOSPITAL ENCOUNTER (OUTPATIENT)
Dept: PHYSICAL THERAPY | Facility: CLINIC | Age: 30
Setting detail: THERAPIES SERIES
End: 2020-04-09
Attending: PSYCHIATRY & NEUROLOGY
Payer: COMMERCIAL

## 2020-04-09 PROCEDURE — 97112 NEUROMUSCULAR REEDUCATION: CPT | Mod: GP,GT | Performed by: PHYSICAL THERAPIST

## 2020-04-09 PROCEDURE — 97110 THERAPEUTIC EXERCISES: CPT | Mod: GP,95 | Performed by: PHYSICAL THERAPIST

## 2020-04-09 NOTE — PROGRESS NOTES
Rosemary Davis is a 30 year old female who is being seen via a billable video visit.      Patient has given verbal consent for Video visit? Yes    Video Start Time: 1301    Telehealth Visit Details    Type of Service:  Telehealth    Video End Time (time video stopped): 1342    Originating Location (pt. location): Home    Additional Participants in Telehealth Visit: No    Distant Location (provider location):  [facility] PHYSICAL THERAPY     Mode of Communication (Audio Visual or Audio Only):  Audio/Visual    Bethany Kaba, PT  April 9, 2020

## 2020-04-23 ENCOUNTER — HOSPITAL ENCOUNTER (OUTPATIENT)
Dept: PHYSICAL THERAPY | Facility: CLINIC | Age: 30
Setting detail: THERAPIES SERIES
End: 2020-04-23
Attending: PSYCHIATRY & NEUROLOGY
Payer: COMMERCIAL

## 2020-04-23 PROCEDURE — 97112 NEUROMUSCULAR REEDUCATION: CPT | Mod: GP,GT | Performed by: PHYSICAL THERAPIST

## 2020-04-23 PROCEDURE — 97110 THERAPEUTIC EXERCISES: CPT | Mod: GP,GT | Performed by: PHYSICAL THERAPIST

## 2020-04-23 PROCEDURE — 40000809 ZZH STATISTIC NO DOCUMENTATION TO SUPPORT CHARGE: Mod: 95,GP | Performed by: PHYSICAL THERAPIST

## 2020-05-18 ENCOUNTER — HOSPITAL ENCOUNTER (OUTPATIENT)
Dept: PHYSICAL THERAPY | Facility: CLINIC | Age: 30
Setting detail: THERAPIES SERIES
End: 2020-05-18
Attending: PSYCHIATRY & NEUROLOGY
Payer: COMMERCIAL

## 2020-05-18 PROCEDURE — 97112 NEUROMUSCULAR REEDUCATION: CPT | Mod: GP | Performed by: PHYSICAL THERAPIST

## 2020-06-01 ENCOUNTER — HOSPITAL ENCOUNTER (OUTPATIENT)
Dept: PHYSICAL THERAPY | Facility: CLINIC | Age: 30
Setting detail: THERAPIES SERIES
End: 2020-06-01
Attending: PSYCHIATRY & NEUROLOGY
Payer: COMMERCIAL

## 2020-06-01 PROCEDURE — 97110 THERAPEUTIC EXERCISES: CPT | Mod: GP | Performed by: PHYSICAL THERAPIST

## 2020-06-01 PROCEDURE — 97112 NEUROMUSCULAR REEDUCATION: CPT | Mod: GP | Performed by: PHYSICAL THERAPIST

## 2020-06-08 ENCOUNTER — HOSPITAL ENCOUNTER (OUTPATIENT)
Dept: PHYSICAL THERAPY | Facility: CLINIC | Age: 30
Setting detail: THERAPIES SERIES
End: 2020-06-08
Attending: PSYCHIATRY & NEUROLOGY
Payer: COMMERCIAL

## 2020-06-08 PROCEDURE — 97750 PHYSICAL PERFORMANCE TEST: CPT | Mod: GP | Performed by: PHYSICAL THERAPIST

## 2020-06-08 PROCEDURE — 97112 NEUROMUSCULAR REEDUCATION: CPT | Mod: GP | Performed by: PHYSICAL THERAPIST

## 2020-06-08 PROCEDURE — 97110 THERAPEUTIC EXERCISES: CPT | Mod: GP | Performed by: PHYSICAL THERAPIST

## 2020-06-08 NOTE — PROGRESS NOTES
06/08/20 0900   Signing Clinician's Name / Credentials   Signing clinician's name / credentials Dianna Wan DPT   Functional Gait Assessment (ANAMARIA Dodge, ORLANDO Allen, et al. (2004))   1. GAIT LEVEL SURFACE 2   2. CHANGE IN GAIT SPEED 2  (Increased ataxia with slower gait speed)   3. GAIT WITH HORIZONTAL HEAD TURNS 1   4. GAIT WITH VERTICAL HEAD TURNS 2   5. GAIT AND PIVOT TURN 1   6. STEP OVER OBSTACLE 1   7. GAIT WITH NARROW BASE OF SUPPORT 0   8. GAIT WITH EYES CLOSED 2   9. AMBULATING BACKWARDS 1   10. STEPS 2   Total Functional Gait Assessment Score   TOTAL SCORE: (MAXIMUM SCORE 30) 14     Functional Gait Assessment (FGA): The FGA assesses postural stability during various walking tasks.   Gait assistive device used: None    Patient Score: 14/30  Scores of <22/30 have been correlated with predicting falls in community-dwelling older adults according to Dar & Juventino 2010.   Scores of <18/30 have been correlated with increased risk for falls in patients with Parkinsons Disease according to Manny Sanchez Zhou et al 2014.  Minimal Detectable Change for patients with acute/chronic stroke = 4.2 according to Mathew & Jorge Lschbeatrice 2009  Minimal Detectable Change for patients with vestibular disorder = 8 according to Dar & Juventino 2010    Assessment (rationale for performing, application to patient s function & care plan): Assess risk for falls and progress toward her goals.  Minutes billed as physical performance test: 10

## 2020-06-15 ENCOUNTER — HOSPITAL ENCOUNTER (OUTPATIENT)
Dept: PHYSICAL THERAPY | Facility: CLINIC | Age: 30
Setting detail: THERAPIES SERIES
End: 2020-06-15
Attending: PSYCHIATRY & NEUROLOGY
Payer: COMMERCIAL

## 2020-06-15 PROCEDURE — 97110 THERAPEUTIC EXERCISES: CPT | Mod: GP | Performed by: PHYSICAL THERAPIST

## 2020-06-15 PROCEDURE — 97112 NEUROMUSCULAR REEDUCATION: CPT | Mod: GP | Performed by: PHYSICAL THERAPIST

## 2020-06-22 ENCOUNTER — HOSPITAL ENCOUNTER (OUTPATIENT)
Dept: PHYSICAL THERAPY | Facility: CLINIC | Age: 30
Setting detail: THERAPIES SERIES
End: 2020-06-22
Attending: PSYCHIATRY & NEUROLOGY
Payer: COMMERCIAL

## 2020-06-22 PROCEDURE — 97110 THERAPEUTIC EXERCISES: CPT | Mod: GP | Performed by: PHYSICAL THERAPIST

## 2020-06-22 PROCEDURE — 97112 NEUROMUSCULAR REEDUCATION: CPT | Mod: GP | Performed by: PHYSICAL THERAPIST

## 2020-06-22 PROCEDURE — 97750 PHYSICAL PERFORMANCE TEST: CPT | Mod: GP | Performed by: PHYSICAL THERAPIST

## 2020-06-22 NOTE — PROGRESS NOTES
06/22/20 0900   Signing Clinician's Name / Credentials   Signing clinician's name / credentials Dianna Wan DPT   Functional Gait Assessment (ANAMARIA Dodge, ORLANDO Allen, et al. (2004))   1. GAIT LEVEL SURFACE 2   2. CHANGE IN GAIT SPEED 3  (Improved ataxia noted with changes in gait speed)   3. GAIT WITH HORIZONTAL HEAD TURNS 2   4. GAIT WITH VERTICAL HEAD TURNS 2   5. GAIT AND PIVOT TURN 1   6. STEP OVER OBSTACLE 1   7. GAIT WITH NARROW BASE OF SUPPORT 0   8. GAIT WITH EYES CLOSED 2   9. AMBULATING BACKWARDS 1   10. STEPS 2   Total Functional Gait Assessment Score   TOTAL SCORE: (MAXIMUM SCORE 30) 16     Functional Gait Assessment (FGA): The FGA assesses postural stability during various walking tasks.   Gait assistive device used: None    Patient Score: 16/30  Scores of <22/30 have been correlated with predicting falls in community-dwelling older adults according to Dar & Juventino 2010.   Scores of <18/30 have been correlated with increased risk for falls in patients with Parkinsons Disease according to Manny Sanchez Zhou et al 2014.  Minimal Detectable Change for patients with acute/chronic stroke = 4.2 according to Mathew & Jorge Lschebatrice 2009  Minimal Detectable Change for patients with vestibular disorder = 8 according to Dar & Juventino 2010    Assessment (rationale for performing, application to patient s function & care plan): Assess risk for falls and progress toward her goals.  Minutes billed as physical performance test: 10

## 2020-06-25 ENCOUNTER — TELEPHONE (OUTPATIENT)
Dept: NEUROLOGY | Facility: CLINIC | Age: 30
End: 2020-06-25

## 2020-06-25 DIAGNOSIS — R27.0 ATAXIA: Primary | ICD-10-CM

## 2020-06-25 NOTE — TELEPHONE ENCOUNTER
Health Call Center    Phone Message    May a detailed message be left on voicemail: no     Reason for Call: Other: Rosemary Galarza's mother calling to schedule genetic testing. She is requesting a call back from Iris Boyle. Please call her back at your earliest convenience to discuss.     Action Taken: Message routed to:  [facility] ([facility]):  NEUROLOGY    Travel Screening: Not Applicable

## 2020-06-29 ENCOUNTER — HOSPITAL ENCOUNTER (OUTPATIENT)
Dept: PHYSICAL THERAPY | Facility: CLINIC | Age: 30
Setting detail: THERAPIES SERIES
End: 2020-06-29
Attending: PSYCHIATRY & NEUROLOGY
Payer: COMMERCIAL

## 2020-06-29 PROCEDURE — 97112 NEUROMUSCULAR REEDUCATION: CPT | Mod: GP | Performed by: PHYSICAL THERAPIST

## 2020-06-30 NOTE — TELEPHONE ENCOUNTER
Called pt's mom, Suyapa Nassar. She said Rosemary continues to fall down. She has been going to physical therapy the past month since due to Covid she was not allowed to go for 3 months. She has two more sessions left and the physical therapist is working on safety so when Rosemary goes back to work after the quarantine has been lift she will be safe to go up and down ladders. 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 Suyapa was given the email address for Sam Phillips, the volunteer for the [employer] who helps people with questions about filing for Social Security Disabililty. She will have Roseamry get a hold of him. Suyapa said Rosemary does not use a cane or a walker but said she would be willing to see a physical therapist about being fitted with an ambulatory aide. Pt [facility] [address] [facility]. Suyapa will call back if pt decides she wants to have genetic testing done.           ----- Message from Anay Bowers RN sent at 6/26/2020  7:41 AM CDT -----  Hi Kishore Simmons pt message below for you.     Thanks    Anay GARCIA  ----- Message -----  From: Gary Hoang  Sent: 6/25/2020   4:43 PM CDT  To: Anay Bowers RN, Letitia Cano RN    Hi,    This pt need to see someone in general neurology but do not have a referral should I go ahead an schedule pt? Please read below             Health Call Center     Phone Message     May a detailed message be left on voicemail: no      Reason for Call: Other: Rosemary Galarza's mother calling to schedule genetic testing. She is requesting a call back from Iris Boyle. Please call her back at your earliest convenience to discuss.      Action Taken: Message routed to:  [facility]  NEUROLOGY     Travel Screening: Not Applicable

## 2020-07-09 ENCOUNTER — HOSPITAL ENCOUNTER (OUTPATIENT)
Dept: PHYSICAL THERAPY | Facility: CLINIC | Age: 30
Setting detail: THERAPIES SERIES
End: 2020-07-09
Attending: PSYCHIATRY & NEUROLOGY
Payer: COMMERCIAL

## 2020-07-09 PROCEDURE — 97112 NEUROMUSCULAR REEDUCATION: CPT | Mod: GP | Performed by: PHYSICAL THERAPIST

## 2020-07-09 NOTE — PROGRESS NOTES
Outpatient Physical Therapy Discharge Note     Patient: Rosemary Oconnor Frailing  : 1990    Beginning/End Dates of Reporting Period:  3/4/20 to 2020    Referring Provider: Dr. Wood    Therapy Diagnosis: Impaired ambulation, transfers, and fall risk     Client Self Report: Work went well. No falls in last 2 weeks, not too tired. Worked shorter shifts which was tolerable    Objective Measurements:  Objective Measure: 30 sec STS  Details: 12, 1 incident of retro instability    Objective Measure: FGA  Details: 20             Goals:  Goal Identifier 30sec sit to stand   Goal Description Pt will perform 15 sit to stand transfers in 30sec in order to demonstrate an improvement in strength.   Target Date 20   Date Met      Progress: Not met     Goal Identifier FGA   Goal Description Pt will score a >22/30 in order to demonstrate a decrease in fall risk.   Target Date 20   Date Met      Progress: Not met, improved by 5 pts from eval     Goal Identifier 6MWT   Goal Description Pt will walk for 1600' in order to demonstrate an increase in activity tolerance.   Target Date (Goal not appropriate)   Date Met      Progress:     Goal Identifier FALLS   Goal Description Pt will report being fall free for 45 days in order to safely perform ADLs,  responsibilities, and tasks at her job including carrying boxes and climbing ladders to reach inventory.   Target Date 20   Date Met      Progress:In progress, not met     Goal Identifier Return to work   Goal Description Patient will demonstrate/report ability to perform all work duties with good stability and strategies to maintain safety and decrease fall risk.    Target Date 20   Date Met   20   Progress:   Progress Toward Goals:   Progress this reporting period: Rosemary demonstrated improvements with static and dynamic balance this reporting period. Her FGA score improved from 15 to 20/30 overall. She improved sit to stand reps by 1  repetition. She was able to return to work safely. She has gone 2 weeks without a fall. Feels comfortable continuing independently for a while with HEP.    Plan:  Discharge from therapy.    Discharge:    Reason for Discharge: [place] with HEP      Discharge Plan: Patient to continue home program. Please return if falls become more frequent again

## 2020-07-17 NOTE — TELEPHONE ENCOUNTER
pts mom brina is calling to speak to violet about the genetic testing, please call her back at [phone] thanks!

## 2020-07-28 ENCOUNTER — TELEPHONE (OUTPATIENT)
Dept: NEUROLOGY | Facility: CLINIC | Age: 30
End: 2020-07-28

## 2020-07-28 NOTE — TELEPHONE ENCOUNTER
Spoke to pt's mom, Cristiane Nassar. She said Rosemary spoke to Jean Phillips, the volunteer for the [employer] that answers pt's questions about applying for social security disability. He said it would be better if Rosemary knew specifically what type of ataxia she has so the parents are willing to pay for the genetic testing.  Cristiane was told that Jermaine Iyer, the genetics counselor would be contact and he will call to discuss genetic blood testing. Cristiane said to call her because she drives Rosemary every where and knows her schedule.  Message was sent to Jermaine Iyer.

## 2020-07-28 NOTE — TELEPHONE ENCOUNTER
GENETIC COUNSELING-Ataxia clinic  I spoke with Rosemary's mother at the request of Iris Harris.  She indicated that for Rosemary's disability application, they needed a more 'precise diagnosis' and she wanted to discuss genetic testing. We talked about the fact that even with comprehensive genetic testing, we may not identify a precise diagnosis- therefore it might be more helpful to have her seen in clinic again to determine if there are additional evaluations that would be helpful (e.g. neuropsych testing)- and whether we can better document these for the family.      I did explain that in terms of genetic testing, I think that we should consider doing micro-array (CG[facility]) as it is not clear that this was ever done with her workup several decades ago.  If negative, we could consider other genetic testing options.    Jermaine Iyer MS, [facility]  Certified Genetic Counselor

## 2020-08-13 NOTE — PROGRESS NOTES
"Rosemary Davis is a 30 year old female who is being evaluated via a billable video visit.      The patient has been notified of following:     \"This video visit will be conducted via a call between you and your physician/provider. We have found that certain health care needs can be provided without the need for an in-person physical exam.  This service lets us provide the care you need with a video conversation.  If a prescription is necessary we can send it directly to your pharmacy.  If lab work is needed we can place an order for that and you can then stop by our lab to have the test done at a later time.    Video visits are billed at different rates depending on your insurance coverage.  Please reach out to your insurance provider with any questions.    If during the course of the call the physician/provider feels a video visit is not appropriate, you will not be charged for this service.\"    Patient has given verbal consent for Video visit? Yes  How would you like to obtain your AVS? MyChart  If you are dropped from the video visit, the video invite should be resent to: Send to e-mail at: [email or web address]  Will anyone else be joining your video visit? Yes: mother. How would they like to receive their invitation?         Department of Neurology  Movement Disorders Division     Patient: Rosemary Davis   MRN: [id number]   : 1990   Date of Visit: 2020     Reason for visit: ataxia     History of Present Illness    Ms. Davis is a 30 year old R handed female with PMH of Esotropia, Nystagmus , s/p eye surgery, Global developmental delay , Schizencephaly and now with a 8 year hx of ataxia . Rosemary was adopted at birth and little is known about her biological parents, her mother was from [facility].    She was  evaluation for reversible causes of ataxia ( vitamin B12, folate, vitamin E, alpha fetoprotein, thyroid globulin Ab, thyroid peroxidase Ab, TTG , KARMEN) which were normal.She " was  hesitant with  genetic testings .We reviewed MRI images and there is no cerebellar atrophy .At this point the cause of her ataxia remains unknown.     Since last clinic visit.     Rosemary is considering applying for disability and is in need for a more precise diagnostic and she wants to discuss genetic testing . We are fully supporting her disability and her diagnosis is spinal cerebellar ataxia.    She reports she is walking unaided, occasional falls, tricky to get in a stable position , difficulties getting out of a car . Since last clinic visit gait is not worse, she underwent PT and did not notice any improvement in gait. We mentioned that the purpose of PT sessions is to tech you how to adapt to your disability and avoid falls. She reports fall every 3 weeks , especially when she gets tired.    She does not drive, mother is the one who does all the driving.     We discussed again the genetic testing and she met with genetic counselor.  Whole exome sequencing would be the most effective single test we could do to establish an accurate diagnosis.        Review of Systems:    Other than that mentioned above, the remainder of 12 systems reviewed were negative.    Medications:  Current Outpatient Medications   Medication Sig Dispense Refill     acetaminophen (TYLENOL) 325 MG tablet Take 2 tablets by mouth every 4 hours as needed. (Patient not taking: Reported on 10/4/2019) 250 tablet      citalopram (CELEXA) 20 MG tablet TAKE ONE TAB BY MOUTH ONCE DAILY 90 tablet 3     ferrous sulfate (IRON) 325 (65 Fe) MG tablet Take 1 tablet (325 mg) by mouth 2 times daily (Patient not taking: Reported on 10/4/2019) 60 tablet 2     ibuprofen 200 MG capsule Take 400 mg by mouth every 4 hours as needed for fever. (Patient not taking: Reported on 10/4/2019) 100 capsule 0     levonorgestrel (MIRENA) 20 MCG/24HR IUD 1 each by Intrauterine route once       MYORISAN 30 MG capsule TAKE 1 CAPSULE BY MOUTH TWICE A DAY  0     oxyCODONE  IR (ROXICODONE) 5 MG tablet Take 1-2 tablets (5-10 mg) by mouth every 3 hours as needed (Patient not taking: Reported on 10/4/2019) 10 tablet 0     Prenatal Vit-Fe Fumarate-FA (PRENATAL MULTIVITAMIN PLUS IRON) 27-0.8 MG TABS per tablet Take 1 tablet by mouth daily       VITAMIN D, CHOLECALCIFEROL, PO Take 5,000 Units by mouth daily           Physical Exam:  The patient's  vitals were not taken for this visit.    Bethany is alert and oriented, she answers questions appropriately and gives interval medical history.  She does have gaze evoked nystagmus more prominent on lateral gaze.  I cannot exclude again gaze apraxia she throws head 1 is asked to to the right to left.  Face is symmetric at rest and with activation, tongue protrudes to midline,  She was noted to have dysmetria on finger to finger test again.  We did not check her gait today.  Her gait is is the same as in October.  She was noted to have a wide-based, ataxic, she was not able to walk in tandem gait.    Data Reviewed:   MRI images were prior reviewed.  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    Impression:  Rosemary Davis is a 30 year old female with with use of tropia, nystagmus status post eye surgery, global developmental delay, schizencephaly and now with a history of ataxia.  Reversible causes of ataxia were excluded with normal vitamin B12, folate, vitamin E, alpha fetoprotein, thyroid globulin Ab, thyroid peroxidase Ab, TTG , KARMEN.    Plan:   1.  We fully support  Social Security disability.  Her diagnosis is spinocerebellar ataxia.  2.  Since the treatment in the future might depend on the results of the genetic testing.  We will proceed with whole exom sequencing after prior authorization.  3.  We recommend multivitamins, 1 pill a day.  4.  We advised to avoid falls at any cost.  It is a matter of adapting and avoiding falling.  She did attend physical therapy sessions, up with goal of physical therapy  evaluation is to provide education about the strategies to avoid falls and adapt to the disability      Nubia Dixon MD  Movement Disorders Fellow     Time spent with patient: Greater than 50% of this 34 minute visit was spent in counseling and coordination of care related to ataxia.    Patient seen and discussed with Dr. Wood.      Video-Visit Details    Type of service:  Video Visit    Start: 08/14/2020 10:35 am   Stop: 08/14/2020 11:09 am  Originating Location (pt. Location): Home    Distant Location (provider location):   [facility] NEUROLOGY     Platform used for Video Visit: [employer]    Summary of orders placed this encounter:  No orders of the defined types were placed in this encounter.

## 2020-08-14 ENCOUNTER — VIRTUAL VISIT (OUTPATIENT)
Dept: NEUROLOGY | Facility: CLINIC | Age: 30
End: 2020-08-14
Payer: COMMERCIAL

## 2020-08-14 DIAGNOSIS — Q04.6 SCHIZENCEPHALY (H): ICD-10-CM

## 2020-08-14 DIAGNOSIS — R62.50 DEVELOPMENTAL DELAY: ICD-10-CM

## 2020-08-14 DIAGNOSIS — R27.0 ATAXIA: Primary | ICD-10-CM

## 2020-08-14 NOTE — PROGRESS NOTES
"GENETIC COUNSELING-Ataxia clinic  Rosemary Davis is a 30 year old female who is being evaluated via a billable video visit.  I joined a video visit that was already in progress with Dr. Wood    The patient has been notified of following:     \"This video visit will be conducted via a call between you and your physician/provider. We have found that certain health care needs can be provided without the need for an in-person physical exam.  This service lets us provide the care you need with a video conversation.  If a prescription is necessary we can send it directly to your pharmacy.  If lab work is needed we can place an order for that and you can then stop by our lab to have the test done at a later time.    Video visits are billed at different rates depending on your insurance coverage.  Please reach out to your insurance provider with any questions.    If during the course of the call the physician/provider feels a video visit is not appropriate, you will not be charged for this service.\"    Patient has given verbal consent for Video visit? Yes  If you are dropped from the video visit, the video invite should be resent to:   Will anyone else be joining your video visit? Mother        Video-Visit Details    Type of service:  Video Visit    Video Start Time: 11:05 AM  Video End Time: 11:20 AM    Originating Location (pt. Location): Home    Distant Location (provider location):  [facility] NEUROLOGY     Platform used for Video Visit: [facility]    GENETIC COUNSELING-ataxia clinic  I met again with Rosemary and her mother to discuss genetic testing options.    Rosemary has significant findings of developmental delay, structural brain abnormality, and ataxia.  Given these findings and the congenital onset, she very likely has a genetic form of ataxia.     We discussed the importance of establishing a precise genetic diagnosis. Many forms of ataxia may be accompanied by additional non-neurologic findings that require " screening/intervention such as cancer risk in ataxia telangiectasia, endocrine disorders in SCAR16, and cardiac findings in Friedreich ataxia. In addition, identifying a precise diagnosis will facilitate risk assessment for other family members including her sister and any future children for Rosemary.     We discussed testing strategies. Given the congenital onset and lack of family history, a recessive etiology seems most likely.  Her clinical presentation is not consistent with the known repeat-mediated ataxias (e.g. Friedreich ataxia, SCA1, 2, 3, 6, 7). In addition, she is adopted and we do not have any information about her family medical history.  Given the extreme genetic heterogeneity of ataxias and her complex medical presentation, whole exome sequencing would be the most effective single test we could do to establish an accurate diagnosis.  Experts in this field have demonstrated that for this group of patients, whole exome sequencing has the highest diagnostic yield and is the most cost effective option for establishing a genetic diagnosis (PMID:19574102).     We discussed that whole exome sequencing evaluates all of the coding genes in the human genome. While it is considered a comprehensive test, there are some types of genetic change that may not be detected (e.g. repeat sequences and non-coding variants). I explained that approximately half of individuals undergoing exome sequencing would have a diagnostic finding- although the yield may be higher for Rosemary as she has never had a genetic workup to date.     I explained that exome sequencing may identify medically significant 'secondary findings' that are unrelated to ataxia, but may be important for medical management. Rosemary and her mother indicated that they would like to receive such resutls.  I did explain that because we cannot do a trio analysis, the yield from the exome may be lower.     Following our discussion, Rosemary consented to whole exome  sequencing. We will check on prior authorization before proceeding.    Jermaine Iyer MS, [facility] [place]  Certified Genetic Counselor

## 2020-08-14 NOTE — LETTER
2020       RE: Rosemary Davis  [address] [facility] [address]     Dear Colleague,    Thank you for referring your patient, Rosemary Davis, to the [facility] NEUROLOGY at [facility]. Please see a copy of my visit note below.    Department of Neurology  Movement Disorders Division     Patient: Rosemary Davis   MRN: [id number]   : 1990   Date of Visit: 2020     Reason for visit: ataxia     History of Present Illness    Ms. Davis is a 30 year old R handed female with PMH of Esotropia, Nystagmus , s/p eye surgery, Global developmental delay , Schizencephaly and now with a 8 year hx of ataxia . Rosmeary was adopted at birth and little is known about her biological parents, her mother was from [facility].    She was  evaluation for reversible causes of ataxia ( vitamin B12, folate, vitamin E, alpha fetoprotein, thyroid globulin Ab, thyroid peroxidase Ab, TTG , KARMEN) which were normal.She was  hesitant with  genetic testings .We reviewed MRI images and there is no cerebellar atrophy .At this point the cause of her ataxia remains unknown.     Since last clinic visit.     Rosemary is considering applying for disability and is in need for a more precise diagnostic and she wants to discuss genetic testing . We are fully supporting her disability and her diagnosis is spinal cerebellar ataxia.    She reports she is walking unaided, occasional falls, tricky to get in a stable position , difficulties getting out of a car . Since last clinic visit gait is not worse, she underwent PT and did not notice any improvement in gait. We mentioned that the purpose of PT sessions is to tech you how to adapt to your disability and avoid falls. She reports fall every 3 weeks , especially when she gets tired.    She does not drive, mother is the one who does all the driving.     We discussed again the genetic testing and she met with genetic counselor.  Whole  exome sequencing would be the most effective single test we could do to establish an accurate diagnosis.        Review of Systems:  Other than that mentioned above, the remainder of 12 systems reviewed were negative.    Medications:  Current Outpatient Medications   Medication Sig Dispense Refill     acetaminophen (TYLENOL) 325 MG tablet Take 2 tablets by mouth every 4 hours as needed. (Patient not taking: Reported on 10/4/2019) 250 tablet      citalopram (CELEXA) 20 MG tablet TAKE ONE TAB BY MOUTH ONCE DAILY 90 tablet 3     ferrous sulfate (IRON) 325 (65 Fe) MG tablet Take 1 tablet (325 mg) by mouth 2 times daily (Patient not taking: Reported on 10/4/2019) 60 tablet 2     ibuprofen 200 MG capsule Take 400 mg by mouth every 4 hours as needed for fever. (Patient not taking: Reported on 10/4/2019) 100 capsule 0     levonorgestrel (MIRENA) 20 MCG/24HR IUD 1 each by Intrauterine route once       MYORISAN 30 MG capsule TAKE 1 CAPSULE BY MOUTH TWICE A DAY  0     oxyCODONE IR (ROXICODONE) 5 MG tablet Take 1-2 tablets (5-10 mg) by mouth every 3 hours as needed (Patient not taking: Reported on 10/4/2019) 10 tablet 0     Prenatal Vit-Fe Fumarate-FA (PRENATAL MULTIVITAMIN PLUS IRON) 27-0.8 MG TABS per tablet Take 1 tablet by mouth daily       VITAMIN D, CHOLECALCIFEROL, PO Take 5,000 Units by mouth daily        Physical Exam:  The patient's  vitals were not taken for this visit.    Bethany is alert and oriented, she answers questions appropriately and gives interval medical history.  She does have gaze evoked nystagmus more prominent on lateral gaze.  I cannot exclude again gaze apraxia she throws head 1 is asked to to the right to left.  Face is symmetric at rest and with activation, tongue protrudes to midline,  She was noted to have dysmetria on finger to finger test again.  We did not check her gait today.  Her gait is is the same as in October.  She was noted to have a wide-based, ataxic, she was not able to walk in tandem  gait.    Data Reviewed:   MRI images were prior reviewed.  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    Impression:  Rosemary Davis is a 30 year old female with with use of tropia, nystagmus status post eye surgery, global developmental delay, schizencephaly and now with a history of ataxia.  Reversible causes of ataxia were excluded with normal vitamin B12, folate, vitamin E, alpha fetoprotein, thyroid globulin Ab, thyroid peroxidase Ab, TTG , KARMEN.    Plan:   1.  We fully support  Social Security disability.  Her diagnosis is spinocerebellar ataxia.  2.  Since the treatment in the future might depend on the results of the genetic testing.  We will proceed with whole exom sequencing after prior authorization.  3.  We recommend multivitamins, 1 pill a day.  4.  We advised to avoid falls at any cost.  It is a matter of adapting and avoiding falling.  She did attend physical therapy sessions, up with goal of physical therapy evaluation is to provide education about the strategies to avoid falls and adapt to the disability    Nubia Dixon MD  Movement Disorders Fellow     Time spent with patient: Greater than 50% of this 34 minute visit was spent in counseling and coordination of care related to ataxia.    To Whom It May Concern     This is to certify that Ms. 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 . I fully support her application for disability.    I have personally interviewed and examined Ms. Rosemary Davis and agree with diagnosis and management. The total time spent with the patient was 25 minutes, over 50% of the time spent in counseling and coordinating care.    Again, thank you for allowing me to participate in the care of your patient.  Sincerely,    Suha Wood MD

## 2020-08-14 NOTE — LETTER
8/14/2020       RE: Rosemary Davis  [address] [facility] [address]     Dear Colleague,    Thank you for referring your patient, Rosemary Davis, to the [facility] NEUROLOGY at [facility]. Please see a copy of my visit note below.    GENETIC COUNSELING-Ataxia clinic  Rosemary Davis is a 30 year old female who is being evaluated via a billable video visit.  I joined a video visit that was already in progress with Dr. Wood        Video-Visit Details    Type of service:  Video Visit    Video Start Time: 11:05 AM  Video End Time: 11:20 AM    Originating Location (pt. Location): Home    Distant Location (provider location):  [facility] NEUROLOGY     Platform used for Video Visit: [facility]    GENETIC COUNSELING-ataxia clinic  I met again with Rosemary and her mother to discuss genetic testing options.    Rosemary has significant findings of developmental delay, structural brain abnormality, and ataxia.  Given these findings and the congenital onset, she very likely has a genetic form of ataxia.     We discussed the importance of establishing a precise genetic diagnosis. Many forms of ataxia may be accompanied by additional non-neurologic findings that require screening/intervention such as cancer risk in ataxia telangiectasia, endocrine disorders in SCAR16, and cardiac findings in Friedreich ataxia. In addition, identifying a precise diagnosis will facilitate risk assessment for other family members including her sister and any future children for Rosemary.     We discussed testing strategies. Given the congenital onset and lack of family history, a recessive etiology seems most likely.  Her clinical presentation is not consistent with the known repeat-mediated ataxias (e.g. Friedreich ataxia, SCA1, 2, 3, 6, 7). In addition, she is adopted and we do not have any information about her family medical history.  Given the extreme genetic heterogeneity of ataxias  and her complex medical presentation, whole exome sequencing would be the most effective single test we could do to establish an accurate diagnosis.  Experts in this field have demonstrated that for this group of patients, whole exome sequencing has the highest diagnostic yield and is the most cost effective option for establishing a genetic diagnosis (PMID:09763743).     We discussed that whole exome sequencing evaluates all of the coding genes in the human genome. While it is considered a comprehensive test, there are some types of genetic change that may not be detected (e.g. repeat sequences and non-coding variants). I explained that approximately half of individuals undergoing exome sequencing would have a diagnostic finding- although the yield may be higher for Rosemary as she has never had a genetic workup to date.     I explained that exome sequencing may identify medically significant 'secondary findings' that are unrelated to ataxia, but may be important for medical management. Rosemary and her mother indicated that they would like to receive such resutls.  I did explain that because we cannot do a trio analysis, the yield from the exome may be lower.     Following our discussion, Rosemary consented to whole exome sequencing. We will check on prior authorization before proceeding.    Jermaine Iyer MS, [facility] [place]  Certified Genetic Counselor

## 2020-08-14 NOTE — PROGRESS NOTES
To Whom It May Concern     This is to certify that Ms. 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 . I fully support her application for disability.

## 2020-08-14 NOTE — LETTER
"2020       RE: Rosemary Davis  [address] [facility] [address]     Dear Colleague,    Thank you for referring your patient, Rosemary Davis, to the [facility] NEUROLOGY at [facility]. Please see a copy of my visit note below.    Rosemary Davis is a 30 year old female who is being evaluated via a billable video visit.      The patient has been notified of following:     \"This video visit will be conducted via a call between you and your physician/provider. We have found that certain health care needs can be provided without the need for an in-person physical exam.  This service lets us provide the care you need with a video conversation.  If a prescription is necessary we can send it directly to your pharmacy.  If lab work is needed we can place an order for that and you can then stop by our lab to have the test done at a later time.    Video visits are billed at different rates depending on your insurance coverage.  Please reach out to your insurance provider with any questions.    If during the course of the call the physician/provider feels a video visit is not appropriate, you will not be charged for this service.\"    Patient has given verbal consent for Video visit? Yes  How would you like to obtain your AVS? MyChart  If you are dropped from the video visit, the video invite should be resent to: Send to e-mail at: [email or web address]  Will anyone else be joining your video visit? Yes: mother. How would they like to receive their invitation?   {If patient encounters technical issues they should call [phone] :[id number]}      Department of Neurology  Movement Disorders Division     Patient: Rosemary Davis   MRN: [id number]   : 1990   Date of Visit: 2020     Reason for visit: ataxia     History of Present Illness    Ms. Davis is a 30 year old R handed female with PMH of Esotropia, Nystagmus , s/p eye surgery, " Global developmental delay , Schizencephaly and now with a 8 year hx of ataxia . Rosemary was adopted at birth and little is known about her biological parents, her mother was from [place].    She was  evaluation for reversible causes of ataxia ( vitamin B12, folate, vitamin E, alpha fetoprotein, thyroid globulin Ab, thyroid peroxidase Ab, TTG , KARMEN) which were normal.She was  hesitant with  genetic testings .We reviewed MRI images and there is no cerebellar atrophy .At this point the cause of her ataxia remains unknown.     Since last clinic visit.     Rosemary is considering applying for disability and is in need for a more precise diagnostic and she wants to discuss genetic testing . We are fully supporting her disability and her diagnosis is spinal cerebellar ataxia.    She reports she is walking unaided, occasional falls, tricky to get in a stable position , difficulties getting out of a car . Since last clinic visit gait is not worse, she underwent PT and did not notice any improvement in gait. We mentioned that the purpose of PT sessions is to tech you how to adapt to your disability and avoid falls. She reports fall every 3 weeks , especially when she gets tired.    She does not drive, mother is the one who does all the driving.     We discussed again the genetic testing and she met with genetic counselor.  Whole exome sequencing would be the most effective single test we could do to establish an accurate diagnosis.        Review of Systems:    Other than that mentioned above, the remainder of 12 systems reviewed were negative.    Medications:  Current Outpatient Medications   Medication Sig Dispense Refill     acetaminophen (TYLENOL) 325 MG tablet Take 2 tablets by mouth every 4 hours as needed. (Patient not taking: Reported on 10/4/2019) 250 tablet      citalopram (CELEXA) 20 MG tablet TAKE ONE TAB BY MOUTH ONCE DAILY 90 tablet 3     ferrous sulfate (IRON) 325 (65 Fe) MG tablet Take 1 tablet (325 mg) by  mouth 2 times daily (Patient not taking: Reported on 10/4/2019) 60 tablet 2     ibuprofen 200 MG capsule Take 400 mg by mouth every 4 hours as needed for fever. (Patient not taking: Reported on 10/4/2019) 100 capsule 0     levonorgestrel (MIRENA) 20 MCG/24HR IUD 1 each by Intrauterine route once       MYORISAN 30 MG capsule TAKE 1 CAPSULE BY MOUTH TWICE A DAY  0     oxyCODONE IR (ROXICODONE) 5 MG tablet Take 1-2 tablets (5-10 mg) by mouth every 3 hours as needed (Patient not taking: Reported on 10/4/2019) 10 tablet 0     Prenatal Vit-Fe Fumarate-FA (PRENATAL MULTIVITAMIN PLUS IRON) 27-0.8 MG TABS per tablet Take 1 tablet by mouth daily       VITAMIN D, CHOLECALCIFEROL, PO Take 5,000 Units by mouth daily           Physical Exam:  The patient's  vitals were not taken for this visit.    Bethany is alert and oriented, she answers questions appropriately and gives interval medical history.  She does have gaze evoked nystagmus more prominent on lateral gaze.  I cannot exclude again gaze apraxia she throws head 1 is asked to to the right to left.  Face is symmetric at rest and with activation, tongue protrudes to midline,  She was noted to have dysmetria on finger to finger test again.  We did not check her gait today.  Her gait is is the same as in October.  She was noted to have a wide-based, ataxic, she was not able to walk in tandem gait.    Data Reviewed:   MRI images were prior reviewed.  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    Impression:  Rosemary Davis is a 30 year old female with with use of tropia, nystagmus status post eye surgery, global developmental delay, schizencephaly and now with a history of ataxia.  Reversible causes of ataxia were excluded with normal vitamin B12, folate, vitamin E, alpha fetoprotein, thyroid globulin Ab, thyroid peroxidase Ab, TTG , KARMEN.    Plan:   1.  We fully support  Social Security disability.  Her diagnosis is spinocerebellar ataxia.  2.  " Since the treatment in the future might depend on the results of the genetic testing.  We will proceed with whole exom sequencing after prior authorization.  3.  We recommend multivitamins, 1 pill a day.  4.  We advised to avoid falls at any cost.  It is a matter of adapting and avoiding falling.  She did attend physical therapy sessions, up with goal of physical therapy evaluation is to provide education about the strategies to avoid falls and adapt to the disability      Nubia Dixon MD  Movement Disorders Fellow     Time spent with patient: Greater than 50% of this 34 minute visit was spent in counseling and coordination of care related to ataxia.    Patient seen and discussed with Dr. Wood.      Video-Visit Details    Type of service:  Video Visit    Start: 08/14/2020 10:35 am   Stop: 08/14/2020 11:09 am  Originating Location (pt. Location): Home    Distant Location (provider location):  [facility] NEUROLOGY     Platform used for Video Visit: [employer]    Summary of orders placed this encounter:  No orders of the defined types were placed in this encounter.      Rosemary Davis is a 30 year old female who is being evaluated via a billable video visit.      The patient has been notified of following:     \"This video visit will be conducted via a call between you and your physician/provider. We have found that certain health care needs can be provided without the need for an in-person physical exam.  This service lets us provide the care you need with a video conversation.  If a prescription is necessary we can send it directly to your pharmacy.  If lab work is needed we can place an order for that and you can then stop by our lab to have the test done at a later time.    Video visits are billed at different rates depending on your insurance coverage.  Please reach out to your insurance provider with any questions.    If during the course of the call the physician/provider feels a video visit is not " "appropriate, you will not be charged for this service.\"    Patient has given verbal consent for Video visit? {YES-NO  Default Yes:4444::\"Yes\"}  How would you like to obtain your AVS? {AVS Preference:[id number]}  If you are dropped from the video visit, the video invite should be resent to: {video visit invitation:028211}  Will anyone else be joining your video visit? {:[id number]}  {If patient encounters technical issues they should call [phone] :[id number]}      Saman Rm, EMT        To Whom It May Concern     This is to certify that Ms. 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 . I fully support her application for disability.    I have personally interviewed and examined Ms. Rosemary Davis and agree with diagnosis and management. The total time spent with the patient was 25 minutes, over 50% of the time spent in counseling and coordinating care.      Again, thank you for allowing me to participate in the care of your patient.      Sincerely,    Suha Wood MD      "

## 2020-08-18 ENCOUNTER — TELEPHONE (OUTPATIENT)
Dept: NEUROLOGY | Facility: CLINIC | Age: 30
End: 2020-08-18

## 2020-08-18 NOTE — TELEPHONE ENCOUNTER
Clinic notes from 8/14/2020, letter for disability Written by Dr. Wood and notes from Jermaine Iyer, genetic counselor were mailed to pt to be used to apply for social security disability.

## 2020-09-30 ENCOUNTER — TELEPHONE (OUTPATIENT)
Dept: CONSULT | Facility: CLINIC | Age: 30
End: 2020-09-30

## 2020-09-30 NOTE — TELEPHONE ENCOUNTER
I called 9/30/2020 to update Rosemary on insurance coverage for her genetic testing (no PA was required). However, I was unable to reach Rosemary.  I left a non-detailed voicemail with my name and phone number.    JOSE Zimmerman  Genomics Billing    [facility]   Molecular Diagnostics Laboratory  [address] [place] [id number]  [phone]

## 2020-10-08 NOTE — TELEPHONE ENCOUNTER
Notified Rosemary that no prior authorization is required for her genetic testing. Explained there's no option to guarantee coverage of testing upfront by insurance.    Explained that insurance benefits may still apply, therefore, there could be an out of pocket cost.    Rosemary expressed understanding and stated that she does not wants to proceed with testing at this time. Rosemary had no further questions.    Clare Pope, JOSE  Genomics Billing    [facility]   Molecular Diagnostic Lab  [address] [place] [id number] [phone]

## 2020-12-06 ENCOUNTER — HEALTH MAINTENANCE LETTER (OUTPATIENT)
Age: 30
End: 2020-12-06

## 2020-12-22 DIAGNOSIS — F33.42 RECURRENT MAJOR DEPRESSIVE DISORDER, IN FULL REMISSION (H): ICD-10-CM

## 2020-12-22 RX ORDER — CITALOPRAM HYDROBROMIDE 20 MG/1
TABLET ORAL
Qty: 30 TABLET | Refills: 0 | Status: SHIPPED | OUTPATIENT
Start: 2020-12-22 | End: 2020-12-29

## 2020-12-22 NOTE — TELEPHONE ENCOUNTER
Medication refill: citalopram    LOV: 11/25/2019.     Patient due for an office visit, thirty day supply given.

## 2020-12-29 DIAGNOSIS — F33.42 RECURRENT MAJOR DEPRESSIVE DISORDER, IN FULL REMISSION (H): ICD-10-CM

## 2020-12-29 RX ORDER — CITALOPRAM HYDROBROMIDE 20 MG/1
TABLET ORAL
Qty: 30 TABLET | Refills: 0 | Status: SHIPPED | OUTPATIENT
Start: 2020-12-29 | End: 2020-12-30

## 2020-12-30 ENCOUNTER — VIRTUAL VISIT (OUTPATIENT)
Dept: FAMILY MEDICINE | Facility: CLINIC | Age: 30
End: 2020-12-30

## 2020-12-30 DIAGNOSIS — F33.2 SEVERE EPISODE OF RECURRENT MAJOR DEPRESSIVE DISORDER, WITHOUT PSYCHOTIC FEATURES (H): ICD-10-CM

## 2020-12-30 PROCEDURE — 99214 OFFICE O/P EST MOD 30 MIN: CPT | Mod: 95 | Performed by: NURSE PRACTITIONER

## 2020-12-30 RX ORDER — TRAZODONE HYDROCHLORIDE 50 MG/1
50-100 TABLET, FILM COATED ORAL AT BEDTIME
Qty: 60 TABLET | Refills: 0 | Status: SHIPPED | OUTPATIENT
Start: 2020-12-30 | End: 2021-01-05

## 2020-12-30 RX ORDER — CITALOPRAM HYDROBROMIDE 40 MG/1
40 TABLET ORAL DAILY
Qty: 90 TABLET | Refills: 1 | Status: SHIPPED | OUTPATIENT
Start: 2020-12-30 | End: 2021-06-18

## 2020-12-30 ASSESSMENT — PATIENT HEALTH QUESTIONNAIRE - PHQ9
SUM OF ALL RESPONSES TO PHQ QUESTIONS 1-9: 15
5. POOR APPETITE OR OVEREATING: MORE THAN HALF THE DAYS

## 2020-12-30 ASSESSMENT — ANXIETY QUESTIONNAIRES
GAD7 TOTAL SCORE: 13
7. FEELING AFRAID AS IF SOMETHING AWFUL MIGHT HAPPEN: SEVERAL DAYS
6. BECOMING EASILY ANNOYED OR IRRITABLE: MORE THAN HALF THE DAYS
IF YOU CHECKED OFF ANY PROBLEMS ON THIS QUESTIONNAIRE, HOW DIFFICULT HAVE THESE PROBLEMS MADE IT FOR YOU TO DO YOUR WORK, TAKE CARE OF THINGS AT HOME, OR GET ALONG WITH OTHER PEOPLE: VERY DIFFICULT
1. FEELING NERVOUS, ANXIOUS, OR ON EDGE: NEARLY EVERY DAY
3. WORRYING TOO MUCH ABOUT DIFFERENT THINGS: MORE THAN HALF THE DAYS
5. BEING SO RESTLESS THAT IT IS HARD TO SIT STILL: SEVERAL DAYS
2. NOT BEING ABLE TO STOP OR CONTROL WORRYING: MORE THAN HALF THE DAYS

## 2020-12-30 NOTE — PROGRESS NOTES
Problem(s) Oriented visit    Telephone Visit Details    Type of service:  Telephone Visit- platform difficulties with video visit    Start Time (time call started): 1414    End Time (time call stopped): 1435    Originating Location (pt. Location): Home    Distant Location (provider location):  [facility]     Mode of Communication:  Telephone    Clinician has received verbal consent for a telephone visit from the patient? Yes      AMELIE Whitfield CNP            SUBJECTIVE:                                                    Rosemary Davis is a 30 year old female who presents to clinic today for the following health issues :    Mental health:   PHQ 11/8/2018 11/25/2019 12/30/2020   PHQ-9 Total Score 4 3 15   Q9: Thoughts of better off dead/self-harm past 2 weeks Not at all Not at all Not at all     KARMEN-7 SCORE 11/8/2018 11/25/2019 12/30/2020   Total Score - - -   Total Score 5 6 13     Increased stressors with COVID, staying home with her two year old every day as well. Feeling more emotional than normal the past two weeks despite medication. Not sure if this is seasonal depression or related to the holidays. Trying to be patient, but feels she has exploded in the past couple of weeks.     Hx of ataxia- feels less confident in her gait, has to be extra cautious when picking up her son. Having increasing anxiety going outside and taking walks due to this, feeling stuck inside.     Hasn't been exercising because she gets frustrated with herself because of the ataxia and the depression, feeling sluggish.     Feeling things have been worsening over time.     Has never felt this bad in the past.     Has never tried different medication.    Takes her a long time to fall asleep, tries to take a Tylenol PM at night or SleepyTime tea but sometimes this doesn't work. Not waking in the middle of the night. Getting about nine hours of sleep per night.    No smoking, one alcoholic beverage per month, no  illicit drugs, no coffee but occasional sodas 1-2 times per week    No therapist- might want to try a therapist. Lives in [facility].    No panic attacks.    No SI/HI/self injurious behaviors      Problem list, Medication list, Allergies, and Medical/Social/Surgical histories reviewed in [facility] and updated as appropriate.   Additional history: as documented    ROS:  Gen, CV, resp, GI, MSK, neuro, psych negative except as listed per HPI    Histories:   Patient Active Problem List   Diagnosis     Nonunion of fracture     Ataxia     Depressive disorder     Missed      Humerus fracture     Health Care Home     Recurrent major depressive disorder, in full remission (H)     Indication for care in labor or delivery     S/P      Past Surgical History:   Procedure Laterality Date      SECTION N/A 2018    Procedure:  SECTION;  Surgeon: Geoffrey Trujillo MD;  Location:  L+D     DENTAL SURGERY      wisdom extraction      EYE SURGERY      muscle repair for wandering eye--bilat     OPEN REDUCTION INTERNAL FIXATION ELBOW  2012    Procedure...:OPEN REDUCTION INTERNAL FIXATION ELBOW; REPAIR OF NON-UNION, LEFT ELBOW ; Surgeon:BREE NAVARRETE; Location: OR     OPEN REDUCTION INTERNAL FIXATION HUMERUS DISTAL  2012    Procedure:OPEN REDUCTION INTERNAL FIXATION HUMERUS DISTAL; ORIF LEFT DISTAL HUMERUS FRACTURE. SYNTHES ELBOW PLATES, MINI C-ARM; Surgeon:BREE NAVARRETE; Location: OR       Social History     Tobacco Use     Smoking status: Never Smoker     Smokeless tobacco: Never Used     Tobacco comment: socially only    Substance Use Topics     Alcohol use: No     Family History   Problem Relation Age of Onset     Glaucoma No family hx of      Macular Degeneration No family hx of          Current Outpatient Medications   Medication Sig Dispense Refill     acetaminophen (TYLENOL) 325 MG tablet Take 2 tablets by mouth every 4 hours as needed. 250 tablet      citalopram (CELEXA) 20 MG  tablet TAKE 1 TABLET BY MOUTH EVERY DAY 30 tablet 0     ibuprofen 200 MG capsule Take 400 mg by mouth every 4 hours as needed for fever. 100 capsule 0     levonorgestrel (MIRENA) 20 MCG/24HR IUD 1 each by Intrauterine route once       Multiple Vitamin (MULTIVITAMIN PO)        ferrous sulfate (IRON) 325 (65 Fe) MG tablet Take 1 tablet (325 mg) by mouth 2 times daily (Patient not taking: Reported on 10/4/2019) 60 tablet 2     MYORISAN 30 MG capsule TAKE 1 CAPSULE BY MOUTH TWICE A DAY  0     VITAMIN D, CHOLECALCIFEROL, PO Take 5,000 Units by mouth daily         OBJECTIVE:                                                    No vitals- telephone visit    Alert sounding female in no apparent distress. Speaking in full, coherent sentences without apparent dyspnea, wheezing, or cough. Pleasant and interactive, thought process logical.       ASSESSMENT/PLAN:                                                        Rosemary was seen today for recheck medication.    Diagnoses and all orders for this visit:    Severe episode of recurrent major depressive disorder, without psychotic features (H)  -     citalopram (CELEXA) 40 MG tablet; Take 1 tablet (40 mg) by mouth daily TAKE 1 TABLET BY MOUTH EVERY DAY  -     traZODone (DESYREL) 50 MG tablet; Take 1-2 tablets ( mg) by mouth At Bedtime      Worsening depression and anxiety- increase citalopram to 40mg daily. Needs improvement with sleep- to start trazodone. Referral for therapist given. Discussed exercise and lifestyle changes. Follow up in one month. Reviewed side effects of medications, alarm signs and symptoms, and when to seek further care.      Patient needs assistance with ADLs: none identified today  Patient needs assistance with iADLs: none identified today    The following health maintenance items are reviewed in Epic and correct as of today:  Health Maintenance   Topic Date Due     PREVENTIVE CARE VISIT  1990     DEPRESSION ACTION PLAN  1990     HEPATITIS  C SCREENING  01/19/2006     DTAP/TDAP/TD IMMUNIZATION (5 - Td) 06/07/2016     PAP  02/13/2018     INFLUENZA VACCINE (1) 09/01/2020     PHQ-9  06/30/2021     ADVANCE CARE PLANNING  11/14/2023     HIV SCREENING  Completed     IPV IMMUNIZATION  Completed     HEPATITIS B IMMUNIZATION  Completed     Pneumococcal Vaccine: Pediatrics (0 to 5 Years) and At-Risk Patients (6 to 64 Years)  Aged Out     MENINGITIS IMMUNIZATION  Aged Out       This was a virtual video-visit conducted during COVID-19 outbreak in regulation with social distancing and quarantine recommendations of the CDC and [place] department of health and human services. A two way audio/video connection was used in real time with patient's consent.    AMELIE Whitfield CNP  [facility]  Family Practice  [facility]  [phone]    For any issues my office # is [phone]

## 2020-12-31 ASSESSMENT — ANXIETY QUESTIONNAIRES: GAD7 TOTAL SCORE: 13

## 2021-01-05 DIAGNOSIS — G47.9 DIFFICULTY SLEEPING: Primary | ICD-10-CM

## 2021-01-05 RX ORDER — HYDROXYZINE PAMOATE 25 MG/1
25 CAPSULE ORAL DAILY PRN
Qty: 30 CAPSULE | Refills: 0 | Status: SHIPPED | OUTPATIENT
Start: 2021-01-05 | End: 2021-01-28

## 2021-01-28 DIAGNOSIS — G47.9 DIFFICULTY SLEEPING: ICD-10-CM

## 2021-01-28 RX ORDER — HYDROXYZINE PAMOATE 25 MG/1
25 CAPSULE ORAL DAILY PRN
Qty: 30 CAPSULE | Refills: 0 | Status: SHIPPED | OUTPATIENT
Start: 2021-01-28 | End: 2021-02-01

## 2021-02-01 ENCOUNTER — VIRTUAL VISIT (OUTPATIENT)
Dept: FAMILY MEDICINE | Facility: CLINIC | Age: 31
End: 2021-02-01

## 2021-02-01 DIAGNOSIS — F33.41 RECURRENT MAJOR DEPRESSIVE DISORDER, IN PARTIAL REMISSION (H): ICD-10-CM

## 2021-02-01 DIAGNOSIS — F41.1 GENERALIZED ANXIETY DISORDER: Primary | ICD-10-CM

## 2021-02-01 DIAGNOSIS — R27.0 ATAXIA: ICD-10-CM

## 2021-02-01 PROCEDURE — 99213 OFFICE O/P EST LOW 20 MIN: CPT | Mod: 95 | Performed by: NURSE PRACTITIONER

## 2021-02-01 ASSESSMENT — ANXIETY QUESTIONNAIRES
IF YOU CHECKED OFF ANY PROBLEMS ON THIS QUESTIONNAIRE, HOW DIFFICULT HAVE THESE PROBLEMS MADE IT FOR YOU TO DO YOUR WORK, TAKE CARE OF THINGS AT HOME, OR GET ALONG WITH OTHER PEOPLE: SOMEWHAT DIFFICULT
6. BECOMING EASILY ANNOYED OR IRRITABLE: SEVERAL DAYS
2. NOT BEING ABLE TO STOP OR CONTROL WORRYING: SEVERAL DAYS
3. WORRYING TOO MUCH ABOUT DIFFERENT THINGS: SEVERAL DAYS
GAD7 TOTAL SCORE: 5
5. BEING SO RESTLESS THAT IT IS HARD TO SIT STILL: NOT AT ALL
7. FEELING AFRAID AS IF SOMETHING AWFUL MIGHT HAPPEN: NOT AT ALL
1. FEELING NERVOUS, ANXIOUS, OR ON EDGE: SEVERAL DAYS

## 2021-02-01 ASSESSMENT — PATIENT HEALTH QUESTIONNAIRE - PHQ9
SUM OF ALL RESPONSES TO PHQ QUESTIONS 1-9: 6
5. POOR APPETITE OR OVEREATING: SEVERAL DAYS

## 2021-02-01 NOTE — PATIENT INSTRUCTIONS
Happy belated birthday!  It seems that anxiety and depressive symptoms have significantly improved with increasing citalopram- continue 40mg daily.  Consider therapy- [facility] or [facility] are good options, let me know if you would like a referral.  Exercise can be helpful, both for mental health and possibly the ataxia- [employer] has free online videos available on Youtube. [employer] and [employer] are both apps that have equipment free options, but are both paid subscriptions. The [facility] may be helpful- I have sent you the web address, let me know if you would like a referral.  You can take Benadryl sparingly for sleep, but avoid the combination medications (Tylenol PM/Nyquil) if not needed as they have other medications in them you may not need  If all is well, return in about five months (June/July 2021) for a recheck and a physical

## 2021-02-01 NOTE — PROGRESS NOTES
"Problem(s) Oriented visit    Telephone Visit Details    Type of service:  Telephone Visit- platform difficulties with video visit    Start Time (time call started): 1327    End Time (time call stopped): 1342    Originating Location (pt. Location): Home    Distant Location (provider location):  [facility]     Mode of Communication:  Telephone    Clinician has received verbal consent for a telephone visit from the patient? Yes      AMELIE Whitfield CNP            SUBJECTIVE:                                                    Rosemary \"Pari\" Anastasia Davis is a 31 year old female who presents to clinic today for the following health issues :    Mental health: Notes much improvement after increasing citalopram to 40mg daily. Sleep is better, didn't find hydroxyzine or trazodone to be helpful. Took Nyquil which was helpful. Less emotional than before, still feeling feelings. Still having some anxiety related to ataxia, feels stir crazy not leaving the house. Trying to find things to keep herself busy in the home during the pandemic. Has not yet seen a therapist but is interested in this.      PHQ 2019   PHQ-9 Total Score 3 15 6   Q9: Thoughts of better off dead/self-harm past 2 weeks Not at all Not at all Not at all     KARMEN-7 SCORE 2019   Total Score - - -   Total Score 6 13 5         No smoking, one alcoholic beverage per month, no illicit drugs, no coffee but occasional sodas 1-2 times per week    No panic attacks.    No SI/HI/self injurious behaviors    Son Milo, two dogs Jun and Phillip    Problem list, Medication list, Allergies, and Medical/Social/Surgical histories reviewed in [facility] and updated as appropriate.   Additional history: as documented    ROS:  Gen, MSK, neuro, psych negative except as listed per HPI    Histories:   Patient Active Problem List   Diagnosis     Nonunion of fracture     Ataxia     Depressive disorder     Missed  "     Humerus fracture     Health Care Home     Recurrent major depressive disorder, in full remission (H)     Indication for care in labor or delivery     S/P      Past Surgical History:   Procedure Laterality Date      SECTION N/A 2018    Procedure:  SECTION;  Surgeon: Geoffrey Trujillo MD;  Location:  L+D     DENTAL SURGERY      wisdom extraction      EYE SURGERY      muscle repair for wandering eye--bilat     OPEN REDUCTION INTERNAL FIXATION ELBOW  2012    Procedure...:OPEN REDUCTION INTERNAL FIXATION ELBOW; REPAIR OF NON-UNION, LEFT ELBOW ; Surgeon:BREE NAVARRETE; Location: OR     OPEN REDUCTION INTERNAL FIXATION HUMERUS DISTAL  2012    Procedure:OPEN REDUCTION INTERNAL FIXATION HUMERUS DISTAL; ORIF LEFT DISTAL HUMERUS FRACTURE. SYNTHES ELBOW PLATES, MINI C-ARM; Surgeon:BREE NAVARRETE; Location: OR       Social History     Tobacco Use     Smoking status: Never Smoker     Smokeless tobacco: Never Used     Tobacco comment: socially only    Substance Use Topics     Alcohol use: No     Family History   Problem Relation Age of Onset     Glaucoma No family hx of      Macular Degeneration No family hx of          Current Outpatient Medications   Medication Sig Dispense Refill     acetaminophen (TYLENOL) 325 MG tablet Take 2 tablets by mouth every 4 hours as needed. 250 tablet      citalopram (CELEXA) 40 MG tablet Take 1 tablet (40 mg) by mouth daily TAKE 1 TABLET BY MOUTH EVERY DAY 90 tablet 1     ferrous sulfate (IRON) 325 (65 Fe) MG tablet Take 1 tablet (325 mg) by mouth 2 times daily (Patient not taking: Reported on 10/4/2019) 60 tablet 2     hydrOXYzine (VISTARIL) 25 MG capsule TAKE 1 CAPSULE (25 MG) BY MOUTH DAILY AS NEEDED FOR ITCHING OR ANXIETY (SLEEP) 30 capsule 0     ibuprofen 200 MG capsule Take 400 mg by mouth every 4 hours as needed for fever. 100 capsule 0     levonorgestrel (MIRENA) 20 MCG/24HR IUD 1 each by Intrauterine route once       Multiple Vitamin  (MULTIVITAMIN PO)        MYORISAN 30 MG capsule TAKE 1 CAPSULE BY MOUTH TWICE A DAY  0     VITAMIN D, CHOLECALCIFEROL, PO Take 5,000 Units by mouth daily         OBJECTIVE:                                                    No vitals- telephone visit    Alert sounding female in no apparent distress. Speaking in full, coherent sentences without apparent dyspnea, wheezing, or cough. Pleasant and interactive, thought process logical.       ASSESSMENT/PLAN:                                                        Diagnoses and all orders for this visit:    Generalized anxiety disorder  Recurrent major depressive disorder, in partial remission (H)    Significant improvement- continue citalopram 40mg daily. Reviewed other options for therapists. Discussed sleep- she plans to take Benadryl on an as needed basis- did not tolerate trazodone and hydroxyzine was not very effective. Recheck in five months, earlier with concerns. Reviewed side effects of medications, alarm signs and symptoms, and when to seek further care.    Ataxia    Chronic ongoing issue, discussed exercise, PT, [facility]    Patient needs assistance with ADLs: none identified today  Patient needs assistance with iADLs: none identified today    The following health maintenance items are reviewed in Epic and correct as of today:  Health Maintenance   Topic Date Due     PREVENTIVE CARE VISIT  1990     DEPRESSION ACTION PLAN  1990     HEPATITIS C SCREENING  01/19/2006     DTAP/TDAP/TD IMMUNIZATION (5 - Td) 06/07/2016     PAP  02/13/2018     INFLUENZA VACCINE (1) 09/01/2020     PHQ-9  06/30/2021     ADVANCE CARE PLANNING  11/14/2023     HIV SCREENING  Completed     IPV IMMUNIZATION  Completed     HEPATITIS B IMMUNIZATION  Completed     Pneumococcal Vaccine: Pediatrics (0 to 5 Years) and At-Risk Patients (6 to 64 Years)  Aged Out     MENINGITIS IMMUNIZATION  Aged Out       This was a virtual video-visit conducted during COVID-19  outbreak in regulation with social distancing and quarantine recommendations of the CDC and [place] department of health and human services. A two way audio/video connection was used in real time with patient's consent.    AMELIE Whitfield CNP  [facility]  [phone]    For any issues my office # is [phone]

## 2021-02-02 ASSESSMENT — ANXIETY QUESTIONNAIRES: GAD7 TOTAL SCORE: 5

## 2021-04-10 ENCOUNTER — IMMUNIZATION (OUTPATIENT)
Dept: NURSING | Facility: CLINIC | Age: 31
End: 2021-04-10
Payer: COMMERCIAL

## 2021-04-10 PROCEDURE — 91301 PR COVID VAC MODERNA 100 MCG/0.5 ML IM: CPT

## 2021-04-10 PROCEDURE — 0011A PR COVID VAC MODERNA 100 MCG/0.5 ML IM: CPT

## 2021-05-01 ENCOUNTER — MYC MEDICAL ADVICE (OUTPATIENT)
Dept: NEUROLOGY | Facility: CLINIC | Age: 31
End: 2021-05-01

## 2021-05-04 ENCOUNTER — TELEPHONE (OUTPATIENT)
Dept: NEUROLOGY | Facility: CLINIC | Age: 31
End: 2021-05-04

## 2021-05-04 NOTE — TELEPHONE ENCOUNTER
"GENETIC COUNSELING-Ataxia clinic  I spoke with Pari Davis today to discuss genetic testing options.  We have met in the past to discuss her personal history of ataxia and the potential genetic implications for her children. She and her  are considering having a second child. At this point, we do not know if her ataxia is familial as she is adopted. We have discussed genetic testing options in the past including doing a micro-array as I do not believe she has ever had this done. We have also discussed whole exome sequencing. We tried to obtain prior authorization for testing last fall- but Pari was uncomfortable proceeding as the insurance company gave a \"no prior auth required\" answer- which means that they will not decide on coverage until after the testing is done.    She recently was discussing reproductive options at the [employer] conference. Today, we discussed the fact that in the absence of an established molecular diagnosis, I can only speculate about potential risks to future children. It is possible that she has a dominant ataxia, which could carry a 50% risk to children. She could have a recessive ataxia which would have a very small risk to children. Alternatively, there may not be a clear genetic basis for her ataxia.    I explained that in order to pursue options like PGD, she would need to have an established molecular diagnosis. I did emphasize that even if we pursue genetic tesitng, I cannot guarantee that we would find an answer. We discussed that if she felt that having a precise diagnosis would directly impact her decision making on whether to have a child and/or whether to pursue PGD, then I think it would be more important to pursue this testing now. If her genetic status would not directly bear on these decisions, then it would be Ok to defer on genetic testing for now.  She is currently struggling to decide and we spent time discussing what other patients have decided in this " situation.    She indicated that she would like to think about this some more before making a decision.    Jermaine Iyer MS, [facility]  Certified Genetic Counselor

## 2021-05-08 ENCOUNTER — IMMUNIZATION (OUTPATIENT)
Dept: NURSING | Facility: CLINIC | Age: 31
End: 2021-05-08
Attending: INTERNAL MEDICINE
Payer: COMMERCIAL

## 2021-05-08 PROCEDURE — 91301 PR COVID VAC MODERNA 100 MCG/0.5 ML IM: CPT

## 2021-05-08 PROCEDURE — 0012A PR COVID VAC MODERNA 100 MCG/0.5 ML IM: CPT

## 2021-06-17 DIAGNOSIS — F33.2 SEVERE EPISODE OF RECURRENT MAJOR DEPRESSIVE DISORDER, WITHOUT PSYCHOTIC FEATURES (H): ICD-10-CM

## 2021-06-17 NOTE — TELEPHONE ENCOUNTER
Celexa  Last VV 2/1/2021    PHQ 11/25/2019 12/30/2020 2/1/2021   PHQ-9 Total Score 3 15 6   Q9: Thoughts of better off dead/self-harm past 2 weeks Not at all Not at all Not at all

## 2021-06-18 RX ORDER — CITALOPRAM HYDROBROMIDE 40 MG/1
TABLET ORAL
Qty: 30 TABLET | Refills: 5 | Status: SHIPPED | OUTPATIENT
Start: 2021-06-18 | End: 2021-12-06

## 2021-07-01 DIAGNOSIS — F33.2 SEVERE EPISODE OF RECURRENT MAJOR DEPRESSIVE DISORDER, WITHOUT PSYCHOTIC FEATURES (H): ICD-10-CM

## 2021-07-01 LAB
HPV ABSTRACT: NORMAL
PAP-ABSTRACT: NORMAL

## 2021-07-02 RX ORDER — TRAZODONE HYDROCHLORIDE 50 MG/1
50-100 TABLET, FILM COATED ORAL AT BEDTIME
Qty: 60 TABLET | Refills: 0 | OUTPATIENT
Start: 2021-07-02

## 2021-07-02 NOTE — TELEPHONE ENCOUNTER
Trazodone not refilled since prescription was discontinued at last virtual visit on 2/1/21.    AMELIE Tirado CNP on 7/2/2021 at 4:04 PM

## 2021-09-10 ENCOUNTER — TRANSFERRED RECORDS (OUTPATIENT)
Dept: HEALTH INFORMATION MANAGEMENT | Facility: CLINIC | Age: 31
End: 2021-09-10
Payer: MEDICARE

## 2021-09-10 LAB
HEP C HIM: NORMAL
HIV 1&2 EXT: NORMAL

## 2021-09-25 ENCOUNTER — HEALTH MAINTENANCE LETTER (OUTPATIENT)
Age: 31
End: 2021-09-25

## 2021-10-13 LAB
C TRACH DNA SPEC QL PROBE+SIG AMP: NEGATIVE
N GONORRHOEA DNA SPEC QL PROBE+SIG AMP: NEGATIVE
SPECIMEN DESCRIP: NORMAL
SPECIMEN DESCRIPTION: NORMAL

## 2021-12-06 DIAGNOSIS — F33.2 SEVERE EPISODE OF RECURRENT MAJOR DEPRESSIVE DISORDER, WITHOUT PSYCHOTIC FEATURES (H): ICD-10-CM

## 2021-12-06 RX ORDER — CITALOPRAM HYDROBROMIDE 40 MG/1
TABLET ORAL
Qty: 30 TABLET | Refills: 0 | Status: SHIPPED | OUTPATIENT
Start: 2021-12-06 | End: 2021-12-15

## 2021-12-06 NOTE — TELEPHONE ENCOUNTER
Citalopram. Last addressed 2/1/21 My Chart. Due for CPX with PAP and fasting labs.       Happy belated birthday!  It seems that anxiety and depressive symptoms have significantly improved with increasing citalopram- continue 40mg daily.  Consider therapy- [facility] or [facility] are good options, let me know if you would like a referral.  Exercise can be helpful, both for mental health and possibly the ataxia- [employer] has free online videos available on Youtube. [employer] and [employer] are both apps that have equipment free options, but are both paid subscriptions. [facility] may be helpful- I have sent you the web address, let me know if you would like a referral.  You can take Benadryl sparingly for sleep, but avoid the combination medications (Tylenol PM/Nyquil) if not needed as they have other medications in them you may not need  If all is well, return in about five months (June/July 2021) for a recheck and a physical

## 2021-12-14 NOTE — PATIENT INSTRUCTIONS
Preventive Health Recommendations  Female Ages 26 - 39  Yearly exam:   See your health care provider every year in order to    Review health changes.     Discuss preventive care.      Review your medicines if you your doctor has prescribed any.    Until age 30: Get a Pap test every three years (more often if you have had an abnormal result).    After age 30: Talk to your doctor about whether you should have a Pap test every 3 years or have a Pap test with HPV screening every 5 years.   You do not need a Pap test if your uterus was removed (hysterectomy) and you have not had cancer.  You should be tested each year for STDs (sexually transmitted diseases), if you're at risk.   Talk to your provider about how often to have your cholesterol checked.  If you are at risk for diabetes, you should have a diabetes test (fasting glucose).  Shots: Get a flu shot each year. Get a tetanus shot every 10 years.   Nutrition:     Eat at least 5 servings of fruits and vegetables each day.    Eat whole-grain bread, whole-wheat pasta and brown rice instead of white grains and rice.    Get adequate Calcium and Vitamin D.     Lifestyle    Exercise at least 150 minutes a week (30 minutes a day, 5 days of the week). This will help you control your weight and prevent disease.    Limit alcohol to one drink per day.    No smoking.     Wear sunscreen to prevent skin cancer.    See your dentist every six months for an exam and cleaning.             My Depression Action Plan  Name: Rosemary Davis   Date of Birth 1990  Date: 12/15/2021    My doctor: Kenya Kim   My clinic: [facility] [address] [place] [address] [phone]          [facility]   Good Control    What it looks like:     Things are going generally well. You have normal ups and downs. You may even feel depressed from time to time, but bad moods usually last less than a day.   What you need to do:  1. Continue to care for  yourself (see self care plan)  2. Check your depression survival kit and update it as needed  3. Follow your physician s recommendations including any medication.  4. Do not stop taking medication unless you consult with your physician first.           YELLOW         ZONE Getting Worse    What it looks like:     Depression is starting to interfere with your life.     It may be hard to get out of bed; you may be starting to isolate yourself from others.    Symptoms of depression are starting to last most all day and this has happened for several days.     You may have suicidal thoughts but they are not constant.   What you need to do:     1. Call your care team. Your response to treatment will improve if you keep your care team informed of your progress. Yellow periods are signs an adjustment may need to be made.     2. Continue your self-care.  Just get dressed and ready for the day.  Don't give yourself time to talk yourself out of it.    3. Talk to someone in your support network.    4. Open up your Depression Self-Care Plan/Wellness Kit.           RED    ZONE Medical Alert - Get Help    What it looks like:     Depression is seriously interfering with your life.     You may experience these or other symptoms: You can t get out of bed most days, can t work or engage in other necessary activities, you have trouble taking care of basic hygiene, or basic responsibilities, thoughts of suicide or death that will not go away, self-injurious behavior.     What you need to do:  1. Call your care team and request a same-day appointment. If they are not available (weekends or after hours) call your local crisis line, emergency room or 911.          Depression Self-Care Plan / Wellness Kit    Many people find that medication and therapy are helpful treatments for managing depression. In addition, making small changes to your everyday life can help to boost your mood and improve your wellbeing. Below are some tips for you to  consider. Be sure to talk with your medical provider and/or behavioral health consultant if your symptoms are worsening or not improving.     Sleep   Sleep hygiene  means all of the habits that support good, restful sleep. It includes maintaining a consistent bedtime and wake time, using your bedroom only for sleeping or sex, and keeping the bedroom dark and free of distractions like a computer, smartphone, or television.     Develop a Healthy Routine  Maintain good hygiene. Get out of bed in the morning, make your bed, brush your teeth, take a shower, and get dressed. Don t spend too much time viewing media that makes you feel stressed. Find time to relax each day.    Exercise  Get some form of exercise every day. This will help reduce pain and release endorphins, the  feel good  chemicals in your brain. It can be as simple as just going for a walk or doing some gardening, anything that will get you moving.      Diet  Strive to eat healthy foods, including fruits and vegetables. Drink plenty of water. Avoid excessive sugar, caffeine, alcohol, and other mood-altering substances.     Stay Connected with Others  Stay in touch with friends and family members.    Manage Your Mood  Try deep breathing, massage therapy, biofeedback, or meditation. Take part in fun activities when you can. Try to find something to smile about each day.     Psychotherapy  Be open to working with a therapist if your provider recommends it.     Medication  Be sure to take your medication as prescribed. Most anti-depressants need to be taken every day. It usually takes several weeks for medications to work. Not all medicines work for all people. It is important to follow-up with your provider to make sure you have a treatment plan that is working for you. Do not stop your medication abruptly without first discussing it with your provider.    Crisis Resources   These hotlines are for both adults and children. They and are open 24 hours a day, 7  days a week unless noted otherwise.      National Suicide Prevention Lifeline   8-250-388-TALK (1037)      Crisis Text Line    www.crisistextline.org  Text HOME to 156921 from anywhere in the [place], anytime, about any type of crisis. A live, trained crisis counselor will receive the text and respond quickly.      [facility] Lifeline for LGBTQ Youth  A national crisis intervention and suicide lifeline for LGBTQ youth under 25. Provides a safe place to talk without judgement. Call 1-139.975.3891; text START to 550773 or visit www.thetrevorproject.org to talk to a trained counselor.      For [facility] crisis numbers, visit the [facility] website at:  https://[place].gov/dhs/people-we-serve/adults/health-care/mental-health/resources/crisis-contacts.jsp    [facility] mobile crisis team: Call [phone].  Call **CRISIS (415905) from anywhere in the [facility] to reach the local [facility] crisis team.    Labs  I will talk with pharmacist about citalopram dosing in pregnancy  See me back in a year, let me know how things go!

## 2021-12-14 NOTE — PROGRESS NOTES
" SUBJECTIVE:   CC: Rosemary Davis is an 31 year old woman who presents for preventive health visit.     19 weeks pregnant, due date 5/7/22, no problems with pregnancy, expecting baby girl. Dr. Trujillo at [facility], up to date on Pap and signed MARLA.  Hx MDD and anxiety, on citalopram 40mg daily. Previously had dropped citalopram dose to 20mg daily with first pregnancy and did well. Generally feels symptoms are well controlled at this time and denies need for further intervention. She generally sleeps pretty well but sometimes has difficulty shutting her brain off. Occasionally will take a Benadryl for sleep- denies need for further intervention, previously had tried trazodone and it made her legs \"feel like jelly.\"  Spinocerebellar ataxia, uses cane. Stability has generally been good during pregnancy, no recent falls. Follows with neurology.    Patient has been advised of split billing requirements and indicates understanding: Yes  Healthy Habits:    Do you get at least three servings of calcium containing foods daily (dairy, green leafy vegetables, etc.)? yes    Amount of exercise or daily activities, outside of work: 0 day(s) per week    Problems taking medications regularly No    Medication side effects: No    Have you had an eye exam in the past two years? yes    Do you see a dentist twice per year? yes    Do you have sleep apnea, excessive snoring or daytime drowsiness?no    Pap smear done by [facility], will have MARLA    PHQ 12/30/2020 2/1/2021 12/15/2021   PHQ-9 Total Score 15 6 4   Q9: Thoughts of better off dead/self-harm past 2 weeks Not at all Not at all Not at all     KARMEN-7 SCORE 12/30/2020 2/1/2021 12/15/2021   Total Score - - -   Total Score 13 5 3           Today's PHQ-2 Score:   PHQ-2 ( 1999 Pfizer) 12/15/2021 11/25/2019   Q1: Little interest or pleasure in doing things 0 1   Q2: Feeling down, depressed or hopeless 0 0   PHQ-2 Score 0 1   PHQ-2 Total Score (12-17 Years)- Positive if 3 or " more points; Administer PHQ-A if positive - 1       Abuse: Current or Past(Physical, Sexual or Emotional)- No  Do you feel safe in your environment? Yes        Social History     Tobacco Use     Smoking status: Never Smoker     Smokeless tobacco: Never Used   Substance Use Topics     Alcohol use: Not Currently     If you drink alcohol do you typically have >3 drinks per day or >7 drinks per week? No                     Reviewed orders with patient.  Reviewed health maintenance and updated orders accordingly - Yes  Lab work is in process  Labs reviewed in EPIC  BP Readings from Last 3 Encounters:   12/15/21 108/75   19 98/68   10/04/19 121/66    Wt Readings from Last 3 Encounters:   12/15/21 78.5 kg (173 lb)   19 77.6 kg (171 lb)   10/04/19 76.7 kg (169 lb)                  Patient Active Problem List   Diagnosis     Nonunion of fracture     Ataxia     Missed      Humerus fracture     Health Care Home     Recurrent major depressive disorder, in full remission (H)     Indication for care in labor or delivery     S/P      Generalized anxiety disorder     Past Surgical History:   Procedure Laterality Date      SECTION N/A 2018    Procedure:  SECTION;  Surgeon: Geoffrey Trujillo MD;  Location:  L+D     DENTAL SURGERY      wisdom extraction      EYE SURGERY      muscle repair for wandering eye--bilat     OPEN REDUCTION INTERNAL FIXATION ELBOW  2012    Procedure...:OPEN REDUCTION INTERNAL FIXATION ELBOW; REPAIR OF NON-UNION, LEFT ELBOW ; Surgeon:BREE NAVARRETE; Location: OR     OPEN REDUCTION INTERNAL FIXATION HUMERUS DISTAL  2012    Procedure:OPEN REDUCTION INTERNAL FIXATION HUMERUS DISTAL; ORIF LEFT DISTAL HUMERUS FRACTURE. SYNTHES ELBOW PLATES, MINI C-ARM; Surgeon:BREE NAVARRETE; Location: OR       Social History     Tobacco Use     Smoking status: Never Smoker     Smokeless tobacco: Never Used   Substance Use Topics     Alcohol use: Not Currently      Family History   Problem Relation Age of Onset     Glaucoma No family hx of      Macular Degeneration No family hx of          Current Outpatient Medications   Medication Sig Dispense Refill     acetaminophen (TYLENOL) 325 MG tablet Take 2 tablets by mouth every 4 hours as needed. 250 tablet      citalopram (CELEXA) 20 MG tablet Take 1 tablet (20 mg) by mouth daily TAKE ONE TAB BY MOUTH DAILY 90 tablet 3     Prenatal Vit-Fe Fumarate-FA (PNV PRENATAL PLUS MULTIVITAMIN) 27-1 MG TABS per tablet Take 1 tablet by mouth daily       Allergies   Allergen Reactions     Sulfa Drugs Nausea and Vomiting     Cold/shivering/conjunctival injection     Amoxicillin Hives     Recent Labs   Lab Test 17  1347   *   HDL 37*   TRIG 320*       Reviewed and updated as needed this visit by clinical staff  Tobacco  Allergies  Meds  Problems  Med Hx  Surg Hx  Fam Hx         Reviewed and updated as needed this visit by Provider  Tobacco    Problems  Med Hx  Surg Hx  Fam Hx        Past Medical History:   Diagnosis Date     Ataxia      Depressive disorder      Humerus fracture initial injury  2012    ORIF left humerus in 2012     Missed        Past Surgical History:   Procedure Laterality Date      SECTION N/A 2018    Procedure:  SECTION;  Surgeon: Geoffrey Trujillo MD;  Location:  L+D     DENTAL SURGERY      wisdom extraction      EYE SURGERY      muscle repair for wandering eye--bilat     OPEN REDUCTION INTERNAL FIXATION ELBOW  2012    Procedure...:OPEN REDUCTION INTERNAL FIXATION ELBOW; REPAIR OF NON-UNION, LEFT ELBOW ; Surgeon:BREE NAVARRETE; Location: OR     OPEN REDUCTION INTERNAL FIXATION HUMERUS DISTAL  2012    Procedure:OPEN REDUCTION INTERNAL FIXATION HUMERUS DISTAL; ORIF LEFT DISTAL HUMERUS FRACTURE. SYNTHES ELBOW PLATES, MINI C-ARM; Surgeon:BREE NAVARRETE; Location: OR       ROS:  Gen, HEENT, breast, CV, resp, GI, , MSK, heme/lymph, endo, skin,  "neuro, psych negative except as listed per HPI      OBJECTIVE:   Physical Exam:    /75   Pulse 102   Temp 98.1  F (36.7  C) (Temporal)   Resp 18   Ht 1.499 m (4' 11\")   Wt 78.5 kg (173 lb)   SpO2 98%   BMI 34.94 kg/m      CONSTITUTIONAL: Alert non-toxic appearing female in no acute distress  HEAD: Normocephalic, atraumatic  EYES: PERRLA; no scleral icterus; sclera without injection  ENT: EACs clear bilaterally, TMs pearly gray and intact with good visualization of bony landmarks and cone of light, no bulging or erythema; nares patent without ulceration or edema; oropharynx pink without lesions; posterior pharynx without exudates  NECK: Neck supple without lymphadenopathy, thyroid without enlargement or nodularity  BREAST: Not performed, up to date with OBGYN  RESPIRATORY: Lungs clear to auscultation, respirations unlabored  CV: Regular rate and rhythm, S1S2, no clicks, murmurs, rubs, or gallops; bilateral lower extremities without edema, posterior tibialis pulses 2+ bilaterally  GASTROINTESTINAL: Normoactive bowel sounds x4 quadrants, abdomen soft, non-distended, and non-tender to palpation without masses or organomegaly  : Not performed, up to date with OBGYN  MUSCULOSKELETAL: Moves all extremities, no obvious muscle wasting  NEUROLOGIC: No gross deficits, uses cane for stability with gait  SKIN: Appropriate color for race, warm and dry, no suspicious lesions or rashes  PSYCHIATRIC: Pleasant and interactive, affect euthymic, makes appropriate eye contact, thought process logical        Diagnostic Test Results:  Labs reviewed in Epic  No results found for this or any previous visit (from the past 24 hour(s)).    ASSESSMENT/PLAN:   Rosemary was seen today for physical.    Diagnoses and all orders for this visit:    Routine general medical examination at a health care facility    Well appearing 31 year old female. See below for counseling. 19 weeks pregnant, following with OBGYN, doing well. Taking " "prenatal vitamin with folic acid. Explicitly states she feels safe at home and in her relationship.    Need for hepatitis C screening test  -     HCV Antibody ([facility])  -     VENOUS COLLECTION    Screening for endocrine, metabolic and immunity disorder  -     Basic Metabolic Panel (8) ([facility])  -     VENOUS COLLECTION    Screening for lipoid disorders  -     Lipid Panel ([facility])  -     VENOUS COLLECTION    Ataxia    Stable, using cane per baseline. No recent falls, consider PT/OT for balance during pregnancy if needed. Follows with neurology.    Generalized anxiety disorder  Recurrent major depressive disorder, in full remission (H)  -     DEPRESSION ACTION PLAN (DAP)  -     citalopram (CELEXA) 20 MG tablet; Take 1 tablet (20 mg) by mouth daily TAKE ONE TAB BY MOUTH DAILY    Reports mental health is well controlled at this time- discussed QT prolongation with citalopram vs risks of worsening depression/anxiety with decreasing dose. She feels comfortable decreasing the dose of citalopram to 20mg daily but will update me with any changes in mental health and will follow up if mental health worsens in the post-partum period as well. Reviewed side effects of medications, alarm signs and symptoms, and when to seek further care.              Patient has been advised of split billing requirements and indicates understanding: Yes  COUNSELING:   Reviewed preventive health counseling, as reflected in patient instructions  Special attention given to:        Regular exercise       Healthy diet/nutrition       Folic Acid    Estimated body mass index is 34.94 kg/m  as calculated from the following:    Height as of this encounter: 1.499 m (4' 11\").    Weight as of this encounter: 78.5 kg (173 lb).    Weight: Patient is pregnant, BMI calculation is not appropriate at this time. Encouraged healthy diet and regular exercise.    She reports that she has never smoked. She has never used smokeless tobacco.      Counseling " Resources:  ATP IV Guidelines  Pooled Cohorts Equation Calculator  Breast Cancer Risk Calculator  BRCA-Related Cancer Risk Assessment: FHS-7 Tool  FRAX Risk Assessment  ICSI Preventive Guidelines  Dietary Guidelines for Americans, 2010  USDA's MyPlate  ASA Prophylaxis  Lung CA Screening    AMELIE Whiftield CNP  [facility]

## 2021-12-15 ENCOUNTER — OFFICE VISIT (OUTPATIENT)
Dept: FAMILY MEDICINE | Facility: CLINIC | Age: 31
End: 2021-12-15

## 2021-12-15 ENCOUNTER — TRANSFERRED RECORDS (OUTPATIENT)
Dept: FAMILY MEDICINE | Facility: CLINIC | Age: 31
End: 2021-12-15

## 2021-12-15 VITALS
OXYGEN SATURATION: 98 % | WEIGHT: 173 LBS | DIASTOLIC BLOOD PRESSURE: 75 MMHG | SYSTOLIC BLOOD PRESSURE: 108 MMHG | BODY MASS INDEX: 34.88 KG/M2 | HEIGHT: 59 IN | RESPIRATION RATE: 18 BRPM | TEMPERATURE: 98.1 F | HEART RATE: 102 BPM

## 2021-12-15 DIAGNOSIS — F41.1 GENERALIZED ANXIETY DISORDER: ICD-10-CM

## 2021-12-15 DIAGNOSIS — G11.8 SPINOCEREBELLAR ATAXIA (H): ICD-10-CM

## 2021-12-15 DIAGNOSIS — F33.42 RECURRENT MAJOR DEPRESSIVE DISORDER, IN FULL REMISSION (H): ICD-10-CM

## 2021-12-15 DIAGNOSIS — Z13.0 SCREENING FOR ENDOCRINE, METABOLIC AND IMMUNITY DISORDER: ICD-10-CM

## 2021-12-15 DIAGNOSIS — Z13.228 SCREENING FOR ENDOCRINE, METABOLIC AND IMMUNITY DISORDER: ICD-10-CM

## 2021-12-15 DIAGNOSIS — Z11.59 NEED FOR HEPATITIS C SCREENING TEST: ICD-10-CM

## 2021-12-15 DIAGNOSIS — Z00.00 ROUTINE GENERAL MEDICAL EXAMINATION AT A HEALTH CARE FACILITY: Primary | ICD-10-CM

## 2021-12-15 DIAGNOSIS — Z13.29 SCREENING FOR ENDOCRINE, METABOLIC AND IMMUNITY DISORDER: ICD-10-CM

## 2021-12-15 DIAGNOSIS — Z13.220 SCREENING FOR LIPOID DISORDERS: ICD-10-CM

## 2021-12-15 PROCEDURE — 99395 PREV VISIT EST AGE 18-39: CPT | Performed by: NURSE PRACTITIONER

## 2021-12-15 PROCEDURE — 36415 COLL VENOUS BLD VENIPUNCTURE: CPT | Performed by: NURSE PRACTITIONER

## 2021-12-15 RX ORDER — VITAMIN A ACETATE, .BETA.-CAROTENE, ASCORBIC ACID, CHOLECALCIFEROL, .ALPHA.-TOCOPHEROL ACETATE, DL-, THIAMINE MONONITRATE, RIBOFLAVIN, NIACINAMIDE, PYRIDOXINE HYDROCHLORIDE, FOLIC ACID, CYANOCOBALAMIN, CALCIUM CARBONATE, FERROUS FUMARATE, ZINC OXIDE, AND CUPRIC OXIDE 2000; 2000; 120; 400; 22; 1.84; 3; 20; 10; 1; 12; 200; 27; 25; 2 [IU]/1; [IU]/1; MG/1; [IU]/1; MG/1; MG/1; MG/1; MG/1; MG/1; MG/1; UG/1; MG/1; MG/1; MG/1; MG/1
1 TABLET ORAL DAILY
COMMUNITY
End: 2022-07-22

## 2021-12-15 RX ORDER — CITALOPRAM HYDROBROMIDE 20 MG/1
20 TABLET ORAL DAILY
Qty: 90 TABLET | Refills: 3 | Status: SHIPPED | OUTPATIENT
Start: 2021-12-15 | End: 2022-01-20

## 2021-12-15 ASSESSMENT — ANXIETY QUESTIONNAIRES
6. BECOMING EASILY ANNOYED OR IRRITABLE: SEVERAL DAYS
7. FEELING AFRAID AS IF SOMETHING AWFUL MIGHT HAPPEN: NOT AT ALL
3. WORRYING TOO MUCH ABOUT DIFFERENT THINGS: SEVERAL DAYS
GAD7 TOTAL SCORE: 3
IF YOU CHECKED OFF ANY PROBLEMS ON THIS QUESTIONNAIRE, HOW DIFFICULT HAVE THESE PROBLEMS MADE IT FOR YOU TO DO YOUR WORK, TAKE CARE OF THINGS AT HOME, OR GET ALONG WITH OTHER PEOPLE: SOMEWHAT DIFFICULT
1. FEELING NERVOUS, ANXIOUS, OR ON EDGE: SEVERAL DAYS
2. NOT BEING ABLE TO STOP OR CONTROL WORRYING: NOT AT ALL
5. BEING SO RESTLESS THAT IT IS HARD TO SIT STILL: NOT AT ALL

## 2021-12-15 ASSESSMENT — PATIENT HEALTH QUESTIONNAIRE - PHQ9
5. POOR APPETITE OR OVEREATING: NOT AT ALL
SUM OF ALL RESPONSES TO PHQ QUESTIONS 1-9: 4

## 2021-12-15 ASSESSMENT — MIFFLIN-ST. JEOR: SCORE: 1395.35

## 2021-12-16 ENCOUNTER — TRANSCRIBE ORDERS (OUTPATIENT)
Dept: MATERNAL FETAL MEDICINE | Facility: CLINIC | Age: 31
End: 2021-12-16
Payer: COMMERCIAL

## 2021-12-16 ENCOUNTER — TRANSFERRED RECORDS (OUTPATIENT)
Dept: HEALTH INFORMATION MANAGEMENT | Facility: CLINIC | Age: 31
End: 2021-12-16
Payer: COMMERCIAL

## 2021-12-16 ENCOUNTER — MEDICAL CORRESPONDENCE (OUTPATIENT)
Dept: HEALTH INFORMATION MANAGEMENT | Facility: CLINIC | Age: 31
End: 2021-12-16
Payer: COMMERCIAL

## 2021-12-16 DIAGNOSIS — O26.90 PREGNANCY RELATED CONDITION, ANTEPARTUM: Primary | ICD-10-CM

## 2021-12-16 ASSESSMENT — ANXIETY QUESTIONNAIRES: GAD7 TOTAL SCORE: 3

## 2021-12-17 LAB
BUN SERPL-MCNC: 7 MG/DL (ref 6–20)
BUN/CREATININE RATIO: 14 (ref 9–23)
CALCIUM SERPL-MCNC: 9 MG/DL (ref 8.7–10.2)
CHLORIDE SERPLBLD-SCNC: 100 MMOL/L (ref 96–106)
CHOLEST SERPL-MCNC: 186 MG/DL (ref 100–199)
CREAT SERPL-MCNC: 0.5 MG/DL (ref 0.57–1)
EGFR IF AFRICN AM: 147 ML/MIN/1.73
EGFR IF NONAFRICN AM: 128 ML/MIN/1.73
GLUCOSE SERPL-MCNC: 86 MG/DL (ref 65–99)
HCV AB SERPL QL IA: <0.1 S/CO RATIO (ref 0–0.9)
HDLC SERPL-MCNC: 55 MG/DL
LDL/HDL RATIO: 1.7 RATIO (ref 0–3.2)
LDLC SERPL CALC-MCNC: 95 MG/DL (ref 0–99)
POTASSIUM SERPL-SCNC: 3.8 MMOL/L (ref 3.5–5.2)
SODIUM SERPL-SCNC: 134 MMOL/L (ref 134–144)
TOTAL CO2: 19 MMOL/L (ref 20–29)
TRIGL SERPL-MCNC: 213 MG/DL (ref 0–149)
VLDLC SERPL CALC-MCNC: 36 MG/DL (ref 5–40)

## 2021-12-20 ENCOUNTER — PRE VISIT (OUTPATIENT)
Dept: MATERNAL FETAL MEDICINE | Facility: CLINIC | Age: 31
End: 2021-12-20
Payer: COMMERCIAL

## 2021-12-23 ENCOUNTER — OFFICE VISIT (OUTPATIENT)
Dept: MATERNAL FETAL MEDICINE | Facility: CLINIC | Age: 31
End: 2021-12-23
Attending: OBSTETRICS & GYNECOLOGY
Payer: COMMERCIAL

## 2021-12-23 ENCOUNTER — HOSPITAL ENCOUNTER (OUTPATIENT)
Dept: ULTRASOUND IMAGING | Facility: CLINIC | Age: 31
End: 2021-12-23
Attending: OBSTETRICS & GYNECOLOGY
Payer: COMMERCIAL

## 2021-12-23 DIAGNOSIS — O35.BXX0 PREGNANCY COMPLICATED BY FETAL CONGENITAL HEART DISEASE, SINGLE OR UNSPECIFIED FETUS: Primary | ICD-10-CM

## 2021-12-23 DIAGNOSIS — Z84.81 FAMILY HISTORY OF CARRIER OF GENETIC DISEASE: ICD-10-CM

## 2021-12-23 DIAGNOSIS — O26.90 PREGNANCY RELATED CONDITION, ANTEPARTUM: ICD-10-CM

## 2021-12-23 PROCEDURE — 76811 OB US DETAILED SNGL FETUS: CPT

## 2021-12-23 PROCEDURE — 76811 OB US DETAILED SNGL FETUS: CPT | Mod: 26 | Performed by: OBSTETRICS & GYNECOLOGY

## 2021-12-24 NOTE — PROGRESS NOTES
Rosemary Lovingriver was seen for an ultrasound today at the Maternal-Fetal Medicine center.      For the details of the ultrasound please see the report which can be found under the imaging tab.      Albina Carrasquillo MD  , OB/GYN  Maternal-Fetal Medicine  [email or web address]  [phone] (Main MFM Office)  [phone] or [phone] (for 24 hour MFM questions)  [phone] (Pager)

## 2022-01-20 ENCOUNTER — VIRTUAL VISIT (OUTPATIENT)
Dept: FAMILY MEDICINE | Facility: CLINIC | Age: 32
End: 2022-01-20

## 2022-01-20 DIAGNOSIS — F33.42 RECURRENT MAJOR DEPRESSIVE DISORDER, IN FULL REMISSION (H): ICD-10-CM

## 2022-01-20 DIAGNOSIS — F41.1 GENERALIZED ANXIETY DISORDER: ICD-10-CM

## 2022-01-20 PROCEDURE — 99214 OFFICE O/P EST MOD 30 MIN: CPT | Mod: GT | Performed by: NURSE PRACTITIONER

## 2022-01-20 RX ORDER — CITALOPRAM HYDROBROMIDE 40 MG/1
40 TABLET ORAL DAILY
Qty: 90 TABLET | Refills: 3 | Status: SHIPPED | OUTPATIENT
Start: 2022-01-20 | End: 2022-07-25

## 2022-01-20 ASSESSMENT — PATIENT HEALTH QUESTIONNAIRE - PHQ9
5. POOR APPETITE OR OVEREATING: MORE THAN HALF THE DAYS
SUM OF ALL RESPONSES TO PHQ QUESTIONS 1-9: 11

## 2022-01-20 ASSESSMENT — ANXIETY QUESTIONNAIRES
7. FEELING AFRAID AS IF SOMETHING AWFUL MIGHT HAPPEN: NOT AT ALL
1. FEELING NERVOUS, ANXIOUS, OR ON EDGE: MORE THAN HALF THE DAYS
2. NOT BEING ABLE TO STOP OR CONTROL WORRYING: SEVERAL DAYS
6. BECOMING EASILY ANNOYED OR IRRITABLE: SEVERAL DAYS
IF YOU CHECKED OFF ANY PROBLEMS ON THIS QUESTIONNAIRE, HOW DIFFICULT HAVE THESE PROBLEMS MADE IT FOR YOU TO DO YOUR WORK, TAKE CARE OF THINGS AT HOME, OR GET ALONG WITH OTHER PEOPLE: SOMEWHAT DIFFICULT
3. WORRYING TOO MUCH ABOUT DIFFERENT THINGS: SEVERAL DAYS
5. BEING SO RESTLESS THAT IT IS HARD TO SIT STILL: NOT AT ALL
GAD7 TOTAL SCORE: 7
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
## 2022-01-21 ASSESSMENT — ANXIETY QUESTIONNAIRES: GAD7 TOTAL SCORE: 7

## 2022-02-07 ENCOUNTER — TELEPHONE (OUTPATIENT)
Dept: OPTOMETRY | Facility: CLINIC | Age: 32
End: 2022-02-07
Payer: COMMERCIAL

## 2022-02-07 ENCOUNTER — OFFICE VISIT (OUTPATIENT)
Dept: OPTOMETRY | Facility: CLINIC | Age: 32
End: 2022-02-07
Payer: COMMERCIAL

## 2022-02-07 DIAGNOSIS — H52.13 MYOPIA OF BOTH EYES: Primary | ICD-10-CM

## 2022-02-07 NOTE — PROGRESS NOTES
No office visit. CL order only. Pt transitioning care back to  of  Eye Clinic. Has outside Rx valid until 6/24/22 from [facility]. Rx in chart.     Contact Lens Billing  V-Code:  - Soft spherical  Final Contact Lens Rx       Brand Base Curve Diameter Sphere    Right Air Optix Hydraglyde 8.6 14.2 -4.50    Left Air Optix Hydraglyde 8.6 14.2 -4.50        [facility] order mailed direct: #[id number]     # of units: 1 box  Price per Unit: $55 + $7.95 shipping = $62.95    These are for cosmetic contact lenses.    Encounter Diagnosis   Name Primary?     Myopia of both eyes Yes        Date of last eye exam: None here since 2016. Has valid outside Rx good until 6/24/22

## 2022-02-11 ENCOUNTER — OFFICE VISIT (OUTPATIENT)
Dept: CARDIOLOGY | Facility: CLINIC | Age: 32
End: 2022-02-11
Payer: COMMERCIAL

## 2022-02-11 ENCOUNTER — HOSPITAL ENCOUNTER (OUTPATIENT)
Dept: CARDIOLOGY | Facility: CLINIC | Age: 32
Discharge: HOME | End: 2022-02-11
Attending: OBSTETRICS & GYNECOLOGY | Admitting: OBSTETRICS & GYNECOLOGY
Payer: COMMERCIAL

## 2022-02-11 DIAGNOSIS — O26.90 PREGNANCY RELATED CONDITION, ANTEPARTUM: ICD-10-CM

## 2022-02-11 DIAGNOSIS — O26.90 PREGNANCY RELATED CONDITION, ANTEPARTUM: Primary | ICD-10-CM

## 2022-02-11 PROCEDURE — 93325 DOPPLER ECHO COLOR FLOW MAPG: CPT

## 2022-02-11 PROCEDURE — 99204 OFFICE O/P NEW MOD 45 MIN: CPT | Mod: 25 | Performed by: PEDIATRICS

## 2022-02-11 PROCEDURE — 76827 ECHO EXAM OF FETAL HEART: CPT | Mod: 26 | Performed by: PEDIATRICS

## 2022-02-11 PROCEDURE — 76825 ECHO EXAM OF FETAL HEART: CPT | Mod: 26 | Performed by: PEDIATRICS

## 2022-02-11 PROCEDURE — 93325 DOPPLER ECHO COLOR FLOW MAPG: CPT | Mod: 26 | Performed by: PEDIATRICS

## 2022-02-11 NOTE — PROGRESS NOTES
Fetal Cardiology Consultation    Patient:  Rosemary Davis MRN:  [id number]   YOB: 1990 Age:  32 year old   Date of Visit:  2022 PCP:  Kenya Kim APRN CNP   GARFIELD: 2022, by Last Menstrual Period EGA: 27w6d weeks     Dear Dr. Carrasquillo:    I had the pleasure of seeing Rosemary Davis at the [facility] in [place] on 2022 in consultation for fetal echocardiography results. She presented today accompanied by her partner. As you know, she is a 32 year old female with suspected fetal cardiac anomaly on obstetrical ultrasound (ventricular septal defect).    The fetal echocardiogram was abnormal: One small/moderate and one tiny midseptal muscular ventricular septal defect. Otherwise normal cardiac anatomy. Normal right and left ventricular size and function. No effusion.    I reviewed and interpreted the fetal echocardiogram today. I discussed the anatomy, physiology, expected fetal course, and need for post-rosa confirmation. The fetal cardiac anatomy is not expected to be dependent on the ductus arteriosus after birth. The expected post- intervention will depend on confirmation of these findings, and is likely to include observation only.     -- No additional fetal echocardiograms are recommended for this pregnancy.  -- A post-rosa transthoracic echocardiogram is recommended to confirm anatomy.  -- Delivery can occur as planned at [facility], from a cardiology standpoint. Ms. Davis will be having a repeat , and the post-rosa echocardiogram can be obtained routinely prior to discharge.    Thank you for allowing me to participate in Ms. Davis's care. Please don't hesitate to contact me or the Fetal Cardiology team at [facility] with any questions or concerns.     I spent a total of 35 minutes on the date of the encounter doing chart review, patient history, documentation,  counseling, and coordinating care.    Oscar Velasquez MD  Pediatric Cardiology  [facility]  Phone [phone]    Review of the result(s) of each unique test - fetal echocardiogram

## 2022-02-24 ENCOUNTER — TRANSFERRED RECORDS (OUTPATIENT)
Dept: HEALTH INFORMATION MANAGEMENT | Facility: CLINIC | Age: 32
End: 2022-02-24
Payer: COMMERCIAL

## 2022-02-25 ENCOUNTER — OFFICE VISIT (OUTPATIENT)
Dept: OPHTHALMOLOGY | Facility: CLINIC | Age: 32
End: 2022-02-25
Payer: COMMERCIAL

## 2022-02-25 DIAGNOSIS — Q07.8 CONGENITAL ANOMALY OF BOTH OPTIC NERVES (H): Primary | ICD-10-CM

## 2022-02-25 DIAGNOSIS — H52.13 MYOPIA OF BOTH EYES: ICD-10-CM

## 2022-02-25 DIAGNOSIS — H18.822 CORNEAL ABRASION OF LEFT EYE DUE TO CONTACT LENS: ICD-10-CM

## 2022-02-25 PROCEDURE — 92004 COMPRE OPH EXAM NEW PT 1/>: CPT | Performed by: OPTOMETRIST

## 2022-02-25 RX ORDER — NEOMYCIN POLYMYXIN B SULFATES AND DEXAMETHASONE 3.5; 10000; 1 MG/ML; [USP'U]/ML; MG/ML
1 SUSPENSION/ DROPS OPHTHALMIC 2 TIMES DAILY
Qty: 5 ML | Refills: 1 | Status: ON HOLD | OUTPATIENT
Start: 2022-02-25 | End: 2022-04-30

## 2022-02-25 ASSESSMENT — VISUAL ACUITY
OS_CC+: -2
OD_CC+: -2
CORRECTION_TYPE: GLASSES
CORRECTION_TYPE: CONTACTS
OS_CC: 20/60
OS_CC: 20/70
OS_CC+: -2
OD_CC: 20/80
METHOD: SNELLEN - LINEAR
OD_CC: 20/60
METHOD: SNELLEN - LINEAR

## 2022-02-25 ASSESSMENT — REFRACTION_CURRENTRX
OD_BRAND: AIR OPTIX AQUA
OD_SPHERE: -4.25
OS_BASECURVE: 8.6
OS_BASECURVE: 8.6
OS_BRAND: AIR OPTIX AQUA
OS_SPHERE: -4.00
OD_DIAMETER: 14.2
OD_SPHERE: -4.50
OD_BASECURVE: 8.6
OS_SPHERE: -4.25
OS_DIAMETER: 14.2
OS_DIAMETER: 14.2
OD_BASECURVE: 8.6
OS_BRAND: AIR OPTIX AQUA
OD_DIAMETER: 14.2
OD_BRAND: AIR OPTIX AQUA

## 2022-02-25 ASSESSMENT — REFRACTION_MANIFEST
OS_CYLINDER: SPHERE
OS_SPHERE: -4.25
OD_CYLINDER: SPHERE
OD_SPHERE: -4.75

## 2022-02-25 ASSESSMENT — CUP TO DISC RATIO
OS_RATIO: 0.35
OD_RATIO: 0.35

## 2022-02-25 ASSESSMENT — REFRACTION_WEARINGRX
SPECS_TYPE: SVL
OS_CYLINDER: SPHERE
OD_SPHERE: -4.75
OS_SPHERE: -4.75
OD_CYLINDER: SPHERE

## 2022-02-25 ASSESSMENT — EXTERNAL EXAM - LEFT EYE: OS_EXAM: NORMAL

## 2022-02-25 ASSESSMENT — TONOMETRY
IOP_METHOD: ICARE
OS_IOP_MMHG: 9
OD_IOP_MMHG: 9

## 2022-02-25 ASSESSMENT — CONF VISUAL FIELD
OS_NORMAL: 1
METHOD: COUNTING FINGERS
OD_NORMAL: 1

## 2022-02-25 ASSESSMENT — SLIT LAMP EXAM - LIDS
COMMENTS: NORMAL
COMMENTS: NORMAL

## 2022-02-25 ASSESSMENT — EXTERNAL EXAM - RIGHT EYE: OD_EXAM: NORMAL

## 2022-02-25 NOTE — NURSING NOTE
Chief Complaints and History of Present Illnesses   Patient presents with     COMPREHENSIVE EYE EXAM     History includes Congenital anomaly of both optic nerves.  Would like to updated glasses and CL Rx     Chief Complaint(s) and History of Present Illness(es)     COMPREHENSIVE EYE EXAM     Laterality: both eyes    Associated symptoms: dryness.  Negative for eye pain, redness, tearing and discharge    Treatments tried: artificial tears    Pain scale: 0/10    Comments: History includes Congenital anomaly of both optic nerves.  Would like to updated glasses and CL Rx              Comments     Contact lens user full time. Monthly contacts that are wearing well and comfortable. Has been finding that she has been losing them more or dropping when trying to put in. A few times monthly if rubbing eye may fall out. She questions if this maybe due to dryness. Does notices some dryness. Uses Lubricants PRN and these does relieve. Vision is good with both glasses and contacts.     Patient is 29 weeks pregnant.    JOSH Dong COT 9:09 AM February 25, 2022

## 2022-02-25 NOTE — PROGRESS NOTES
A/P  1.) Congenital optic nerve anomaly OU  -Longstanding, BCVA 20/50- right and left eye  -Nystagmus, better controlled in CL's  -No changes on dilated exam. Continue to monitor    2.) Myopia OU  -Wearing glasses and CL's  -Some intermittent issues with CL's popping out, but largely good comfort and fit   -Prefers same power OU on trial lens over today  -Rec AT when wanting to rub eyes    3.) Central epithelial irregularity left ey  -Central K abrasion/stain  -Endorses discomfort left eye greater when CL is out x 2 months  -Given chronicity and central location rec 2 weeks Maxtirol bid. If symptoms persist return to clinic    Monitor 1-2 years comprehensive, sooner prn    I have confirmed the patient's CC, HPI and reviewed Past Medical History, Past Surgical History, Social History, Family History, Problem List, Medication List and agree with Tech note.     Brandy May, OD FAAO FSLS

## 2022-03-07 DIAGNOSIS — Z11.59 ENCOUNTER FOR SCREENING FOR OTHER VIRAL DISEASES: Primary | ICD-10-CM

## 2022-03-28 ENCOUNTER — TELEPHONE (OUTPATIENT)
Dept: OPTOMETRY | Facility: CLINIC | Age: 32
End: 2022-03-28
Payer: COMMERCIAL

## 2022-03-28 ENCOUNTER — OFFICE VISIT (OUTPATIENT)
Dept: OPTOMETRY | Facility: CLINIC | Age: 32
End: 2022-03-28
Payer: COMMERCIAL

## 2022-03-28 DIAGNOSIS — H52.13 MYOPIA OF BOTH EYES: Primary | ICD-10-CM

## 2022-03-28 ASSESSMENT — REFRACTION_CURRENTRX
OD_DIAMETER: 14.2
OS_SPHERE: -4.25
OD_BASECURVE: 8.6
OD_BRAND: AIR OPTIX HYDRAGLYDE
OS_DIAMETER: 14.2
OS_BRAND: AIR OPTIX HYDRAGLYDE
OS_BASECURVE: 8.6
OD_SPHERE: -4.25

## 2022-03-28 NOTE — PROGRESS NOTES
No office visit. CL order only    Contact Lens Billing  V-Code:  - Soft spherical  Final Contact Lens Rx       Brand Base Curve Diameter Sphere    Right Air Optix Hydraglyde 8.6 14.2 -4.25    Left Air Optix Hydraglyde 8.6 14.2 -4.25         [facility] order mailed direct: [id number]  # of units: 4 boxes  Price per Unit: $55 per box  Free shipping annual supply    These are for cosmetic contact lenses.    Encounter Diagnosis   Name Primary?     Myopia of both eyes Yes        Date of last eye exam: 2/25/22

## 2022-03-29 ENCOUNTER — TRANSFERRED RECORDS (OUTPATIENT)
Dept: HEALTH INFORMATION MANAGEMENT | Facility: CLINIC | Age: 32
End: 2022-03-29
Payer: COMMERCIAL

## 2022-03-30 ENCOUNTER — TELEPHONE (OUTPATIENT)
Dept: UROLOGY | Facility: CLINIC | Age: 32
End: 2022-03-30

## 2022-03-30 ENCOUNTER — TRANSFERRED RECORDS (OUTPATIENT)
Dept: HEALTH INFORMATION MANAGEMENT | Facility: CLINIC | Age: 32
End: 2022-03-30

## 2022-03-30 ENCOUNTER — HOSPITAL ENCOUNTER (OUTPATIENT)
Dept: ULTRASOUND IMAGING | Facility: CLINIC | Age: 32
Discharge: HOME | End: 2022-03-30
Attending: STUDENT IN AN ORGANIZED HEALTH CARE EDUCATION/TRAINING PROGRAM
Payer: COMMERCIAL

## 2022-03-30 ENCOUNTER — OFFICE VISIT (OUTPATIENT)
Dept: MATERNAL FETAL MEDICINE | Facility: CLINIC | Age: 32
End: 2022-03-30
Attending: STUDENT IN AN ORGANIZED HEALTH CARE EDUCATION/TRAINING PROGRAM
Payer: COMMERCIAL

## 2022-03-30 ENCOUNTER — TRANSCRIBE ORDERS (OUTPATIENT)
Dept: MATERNAL FETAL MEDICINE | Facility: CLINIC | Age: 32
End: 2022-03-30
Payer: COMMERCIAL

## 2022-03-30 DIAGNOSIS — O35.EXX0 KIDNEY ABNORMALITY OF FETUS ON PRENATAL ULTRASOUND: Primary | ICD-10-CM

## 2022-03-30 DIAGNOSIS — O26.90 PREGNANCY RELATED CONDITION, ANTEPARTUM: ICD-10-CM

## 2022-03-30 DIAGNOSIS — O26.90 PREGNANCY RELATED CONDITION, ANTEPARTUM: Primary | ICD-10-CM

## 2022-03-30 PROCEDURE — 76816 OB US FOLLOW-UP PER FETUS: CPT | Mod: 26 | Performed by: OBSTETRICS & GYNECOLOGY

## 2022-03-30 PROCEDURE — 76816 OB US FOLLOW-UP PER FETUS: CPT

## 2022-03-30 NOTE — TELEPHONE ENCOUNTER
1st attempt to contact patient to schedule urology appt per referral. Msg left. Deferred until April 1

## 2022-04-26 ENCOUNTER — LAB (OUTPATIENT)
Dept: LAB | Facility: CLINIC | Age: 32
End: 2022-04-26
Payer: COMMERCIAL

## 2022-04-26 DIAGNOSIS — Z11.59 ENCOUNTER FOR SCREENING FOR OTHER VIRAL DISEASES: ICD-10-CM

## 2022-04-26 PROCEDURE — U0003 INFECTIOUS AGENT DETECTION BY NUCLEIC ACID (DNA OR RNA); SEVERE ACUTE RESPIRATORY SYNDROME CORONAVIRUS 2 (SARS-COV-2) (CORONAVIRUS DISEASE [COVID-19]), AMPLIFIED PROBE TECHNIQUE, MAKING USE OF HIGH THROUGHPUT TECHNOLOGIES AS DESCRIBED BY CMS-2020-01-R: HCPCS

## 2022-04-26 PROCEDURE — U0005 INFEC AGEN DETEC AMPLI PROBE: HCPCS

## 2022-04-27 ENCOUNTER — VIRTUAL VISIT (OUTPATIENT)
Dept: PEDIATRICS | Facility: CLINIC | Age: 32
End: 2022-04-27
Attending: OBSTETRICS & GYNECOLOGY
Payer: COMMERCIAL

## 2022-04-27 ENCOUNTER — TELEPHONE (OUTPATIENT)
Dept: NURSING | Facility: CLINIC | Age: 32
End: 2022-04-27

## 2022-04-27 DIAGNOSIS — O35.EXX0 KIDNEY ABNORMALITY OF FETUS ON PRENATAL ULTRASOUND: ICD-10-CM

## 2022-04-27 LAB — SARS-COV-2 RNA RESP QL NAA+PROBE: POSITIVE

## 2022-04-27 PROCEDURE — 99202 OFFICE O/P NEW SF 15 MIN: CPT | Mod: 95 | Performed by: NURSE PRACTITIONER

## 2022-04-27 NOTE — PATIENT INSTRUCTIONS
[facility]   Department of Pediatric Urology    Dr. David Back, Pediatric Urologist  Wilfred Ordoñez, OLIVIANP      [facility] [place]  Nurse Coordinator: Katya De Leon -[phone]    [facility]  Nurse Coordinator: [phone]    [place]  Nurse Coordinator: Katya De Leon [phone]      [place]  Nurse Coordinator: GIRISH Marquez, [phone]      Once your baby is born, please do the following:    -After you have gone home, please call the Nurse Coordinator at your preferred  location and give her your baby s name and date of birth. She will  help coordinate the tests that need to be done about 4 weeks  after birth.    Your baby s test may include:  -Renal Bladder Ultrasound  (all locations)  - Voiding Cystourethrogram (VCUG)  ([facility], [place], )  -Mag 3 Lasix Renogram  (ONLY [facility])

## 2022-04-27 NOTE — PROGRESS NOTES
Pari is a 32 year old who is being evaluated via a billable video visit.      How would you like to obtain your AVS? MyChart  If the video visit is dropped, the invitation should be resent by: Text to cell phone: [phone]  Will anyone else be joining your video visit? No      Video Start Time: 09:00  Video-Visit Details    Type of service:  Video Visit    Video End Time:09:07    Originating Location (pt. Location): Home    Distant Location (provider location):  [facility] [place]     Platform used for Video Visit: Voice123

## 2022-04-27 NOTE — TELEPHONE ENCOUNTER
Patient classified as COVID treatment eligible by Epic high risk algorithm:  Yes    Coronavirus (COVID-19) Notification    Reason for call  Notify of POSITIVE COVID-19 lab result, assess symptoms,  review [facility] recommendations    Lab Result   Lab test for 2019-nCoV rRt-PCR or SARS-COV-2 PCR  Oropharyngeal AND/OR nasopharyngeal swabs were POSITIVE for 2019-nCoV RNA [OR] SARS-COV-2 RNA (COVID-19) RNA     We have been unable to reach patient by phone at this time to notify of their Positive COVID-19 result.    Left voicemail message requesting a call back to [phone] [facility] for results.        A Positive COVID-19 letter will be sent via [employer] or the mail. (Exception, no letters sent to Presurgerical/Preprocedure Patients)    Juliana Chung LPN

## 2022-04-27 NOTE — PROGRESS NOTES
Lew Mc  [address] [facility] [address]  [facility] [address]    RE:  Rosemary Davis  :  1990  Sm[place] MRN:  [id number]  Date of visit:  2022    Dear Dr. Mc:    I had the pleasure of seeing your patient, Rosemary, today through the [facility] in urology consultation for the question of prenatally detected pyelectasis. Please see below the details of this visit and my impression and plans discussed with the family.        CC:  Prenatal consult    HPI:  Rosemary Davis is a 32 year old woman whom I was asked to see in consultation for the above.  This is Rosemary second pregnancy.  The sex of the fetus is female. At the 34 week ultrasound mild Left renal pyelectasis was noted (UTD A1). So far the pregnancy has been going fine.  Rosemary is planning to deliver at [facility]. There is no family history of genitourinary disorders in childhood.    PMH:    Past Medical History:   Diagnosis Date     Ataxia      Depressive disorder      Humerus fracture initial injury  2012    ORIF left humerus in 2012     Missed         PSH:     Past Surgical History:   Procedure Laterality Date      SECTION N/A 2018    Procedure:  SECTION;  Surgeon: Geoffrey Trujillo MD;  Location:  L+D     DENTAL SURGERY      wisdom extraction      EYE SURGERY      muscle repair for wandering eye--bilat     OPEN REDUCTION INTERNAL FIXATION ELBOW  2012    Procedure...:OPEN REDUCTION INTERNAL FIXATION ELBOW; REPAIR OF NON-UNION, LEFT ELBOW ; Surgeon:BREE NAVARRETE; Location: OR     OPEN REDUCTION INTERNAL FIXATION HUMERUS DISTAL  2012    Procedure:OPEN REDUCTION INTERNAL FIXATION HUMERUS DISTAL; ORIF LEFT DISTAL HUMERUS FRACTURE. SYNTHES ELBOW PLATES, MINI C-ARM; Surgeon:BREE NAVARRETE; Location: OR       Meds, allergies, family history, social history reviewed per intake form and confirmed in our EMR.    ROS:   Negative on a 12-point scale. All other pertinent positives mentioned in the HPI.    PE:  Last menstrual period 07/31/2021, unknown if currently breastfeeding.  There is no height or weight on file to calculate BMI.  General:  Well-appearing woman, in no apparent distress.  HEENT:  Normocephalic, normal facies, moist mucous membranes  Resp:  No audible respirations  Neuromuscular:  Muscles symmetrically bulked/developed        Impression:  Prenatally detected fetal pyelectasis (UTD A1).    Plan:    We discussed the likely prenatal causes for this, including prenatal obstructive issues that have already resolved versus those that may need surgical help with resolution in childhood, as well as the possibility of vesicoureteral reflux.  We discussed the risks for urinary tract infection, and the pros and cons of starting the baby on daily, low-dose Amoxicillin, dosed at 10 mg/kg/d, which in this case we will likely not do. We also made our plans for follow-up after the baby is born with renal/bladder ultrasound in 2-4 weeks. I addressed all questions and encouraged a phone call from their MFM if there are any future concerns during the pregnancy.    Thank you very much for allowing me the opportunity to participate in this nice family's care with you.    I spent a total of 21 minutes on the date of encounter doing chart review, history and exam, documentation, and further activities as noted above.      Sincerely,  AMELIE Greenwood, CNP  Pediatric Urology  [facility]

## 2022-04-29 ENCOUNTER — ANESTHESIA EVENT (OUTPATIENT)
Dept: OBGYN | Facility: CLINIC | Age: 32
End: 2022-04-29
Payer: COMMERCIAL

## 2022-04-30 ENCOUNTER — HOSPITAL ENCOUNTER (INPATIENT)
Facility: CLINIC | Age: 32
LOS: 3 days | Discharge: HOME | End: 2022-05-03
Attending: OBSTETRICS & GYNECOLOGY | Admitting: OBSTETRICS & GYNECOLOGY
Payer: COMMERCIAL

## 2022-04-30 ENCOUNTER — ANESTHESIA (OUTPATIENT)
Dept: OBGYN | Facility: CLINIC | Age: 32
End: 2022-04-30
Payer: COMMERCIAL

## 2022-04-30 DIAGNOSIS — Z98.891 S/P C-SECTION: Primary | ICD-10-CM

## 2022-04-30 LAB
ABO/RH(D): NORMAL
ANTIBODY SCREEN: NEGATIVE
HGB BLD-MCNC: 10.1 G/DL (ref 11.7–15.7)
SPECIMEN EXPIRATION DATE: NORMAL
T PALLIDUM AB SER QL: NONREACTIVE

## 2022-04-30 PROCEDURE — 86901 BLOOD TYPING SEROLOGIC RH(D): CPT | Performed by: OBSTETRICS & GYNECOLOGY

## 2022-04-30 PROCEDURE — 120N000012 HC R&B POSTPARTUM

## 2022-04-30 PROCEDURE — 250N000009 HC RX 250: Performed by: OBSTETRICS & GYNECOLOGY

## 2022-04-30 PROCEDURE — 250N000011 HC RX IP 250 OP 636: Performed by: ANESTHESIOLOGY

## 2022-04-30 PROCEDURE — 370N000017 HC ANESTHESIA TECHNICAL FEE, PER MIN: Performed by: OBSTETRICS & GYNECOLOGY

## 2022-04-30 PROCEDURE — 272N000001 HC OR GENERAL SUPPLY STERILE: Performed by: OBSTETRICS & GYNECOLOGY

## 2022-04-30 PROCEDURE — 258N000003 HC RX IP 258 OP 636: Performed by: OBSTETRICS & GYNECOLOGY

## 2022-04-30 PROCEDURE — 360N000076 HC SURGERY LEVEL 3, PER MIN: Performed by: OBSTETRICS & GYNECOLOGY

## 2022-04-30 PROCEDURE — 86780 TREPONEMA PALLIDUM: CPT | Performed by: OBSTETRICS & GYNECOLOGY

## 2022-04-30 PROCEDURE — 258N000003 HC RX IP 258 OP 636

## 2022-04-30 PROCEDURE — 250N000011 HC RX IP 250 OP 636

## 2022-04-30 PROCEDURE — 250N000011 HC RX IP 250 OP 636: Performed by: OBSTETRICS & GYNECOLOGY

## 2022-04-30 PROCEDURE — 85018 HEMOGLOBIN: CPT | Performed by: OBSTETRICS & GYNECOLOGY

## 2022-04-30 PROCEDURE — 710N000010 HC RECOVERY PHASE 1, LEVEL 2, PER MIN: Performed by: OBSTETRICS & GYNECOLOGY

## 2022-04-30 PROCEDURE — 250N000013 HC RX MED GY IP 250 OP 250 PS 637: Performed by: OBSTETRICS & GYNECOLOGY

## 2022-04-30 RX ORDER — MORPHINE SULFATE 1 MG/ML
150 INJECTION, SOLUTION EPIDURAL; INTRATHECAL; INTRAVENOUS ONCE
Status: DISCONTINUED | OUTPATIENT
Start: 2022-04-30 | End: 2022-04-30 | Stop reason: HOSPADM

## 2022-04-30 RX ORDER — NALBUPHINE HYDROCHLORIDE 20 MG/ML
2.5-5 INJECTION, SOLUTION INTRAMUSCULAR; INTRAVENOUS; SUBCUTANEOUS EVERY 6 HOURS PRN
Status: DISCONTINUED | OUTPATIENT
Start: 2022-04-30 | End: 2022-05-01 | Stop reason: CLARIF

## 2022-04-30 RX ORDER — METHYLERGONOVINE MALEATE 0.2 MG/ML
200 INJECTION INTRAVENOUS
Status: DISCONTINUED | OUTPATIENT
Start: 2022-04-30 | End: 2022-05-03 | Stop reason: HOSPADM

## 2022-04-30 RX ORDER — MISOPROSTOL 200 UG/1
400 TABLET ORAL
Status: DISCONTINUED | OUTPATIENT
Start: 2022-04-30 | End: 2022-05-03 | Stop reason: HOSPADM

## 2022-04-30 RX ORDER — TRANEXAMIC ACID 10 MG/ML
1 INJECTION, SOLUTION INTRAVENOUS EVERY 30 MIN PRN
Status: DISCONTINUED | OUTPATIENT
Start: 2022-04-30 | End: 2022-04-30 | Stop reason: HOSPADM

## 2022-04-30 RX ORDER — ENOXAPARIN SODIUM 100 MG/ML
40 INJECTION SUBCUTANEOUS EVERY 24 HOURS
Status: DISCONTINUED | OUTPATIENT
Start: 2022-05-01 | End: 2022-04-30 | Stop reason: CLARIF

## 2022-04-30 RX ORDER — ACETAMINOPHEN 325 MG/1
975 TABLET ORAL ONCE
Status: COMPLETED | OUTPATIENT
Start: 2022-04-30 | End: 2022-04-30

## 2022-04-30 RX ORDER — KETOROLAC TROMETHAMINE 30 MG/ML
INJECTION, SOLUTION INTRAMUSCULAR; INTRAVENOUS PRN
Status: DISCONTINUED | OUTPATIENT
Start: 2022-04-30 | End: 2022-04-30

## 2022-04-30 RX ORDER — HYDROCORTISONE 2.5 %
CREAM (GRAM) TOPICAL 3 TIMES DAILY PRN
Status: DISCONTINUED | OUTPATIENT
Start: 2022-04-30 | End: 2022-05-03 | Stop reason: HOSPADM

## 2022-04-30 RX ORDER — ONDANSETRON 4 MG/1
4 TABLET, ORALLY DISINTEGRATING ORAL EVERY 6 HOURS PRN
Status: DISCONTINUED | OUTPATIENT
Start: 2022-04-30 | End: 2022-05-03 | Stop reason: HOSPADM

## 2022-04-30 RX ORDER — ONDANSETRON 2 MG/ML
INJECTION INTRAMUSCULAR; INTRAVENOUS PRN
Status: DISCONTINUED | OUTPATIENT
Start: 2022-04-30 | End: 2022-04-30

## 2022-04-30 RX ORDER — PROCHLORPERAZINE MALEATE 10 MG
10 TABLET ORAL EVERY 6 HOURS PRN
Status: DISCONTINUED | OUTPATIENT
Start: 2022-04-30 | End: 2022-05-03 | Stop reason: HOSPADM

## 2022-04-30 RX ORDER — AMOXICILLIN 250 MG
1 CAPSULE ORAL 2 TIMES DAILY
Status: DISCONTINUED | OUTPATIENT
Start: 2022-04-30 | End: 2022-05-03 | Stop reason: HOSPADM

## 2022-04-30 RX ORDER — BISACODYL 10 MG
10 SUPPOSITORY, RECTAL RECTAL DAILY PRN
Status: DISCONTINUED | OUTPATIENT
Start: 2022-05-02 | End: 2022-05-03 | Stop reason: HOSPADM

## 2022-04-30 RX ORDER — OXYTOCIN 10 [USP'U]/ML
10 INJECTION, SOLUTION INTRAMUSCULAR; INTRAVENOUS
Status: DISCONTINUED | OUTPATIENT
Start: 2022-04-30 | End: 2022-05-03 | Stop reason: HOSPADM

## 2022-04-30 RX ORDER — CLINDAMYCIN PHOSPHATE 900 MG/50ML
900 INJECTION, SOLUTION INTRAVENOUS
Status: COMPLETED | OUTPATIENT
Start: 2022-04-30 | End: 2022-04-30

## 2022-04-30 RX ORDER — BUPIVACAINE HYDROCHLORIDE 7.5 MG/ML
INJECTION, SOLUTION INTRASPINAL
Status: COMPLETED | OUTPATIENT
Start: 2022-04-30 | End: 2022-04-30

## 2022-04-30 RX ORDER — EPHEDRINE SULFATE 50 MG/ML
5 INJECTION, SOLUTION INTRAMUSCULAR; INTRAVENOUS; SUBCUTANEOUS
Status: DISCONTINUED | OUTPATIENT
Start: 2022-04-30 | End: 2022-04-30 | Stop reason: HOSPADM

## 2022-04-30 RX ORDER — SODIUM CHLORIDE, SODIUM LACTATE, POTASSIUM CHLORIDE, CALCIUM CHLORIDE 600; 310; 30; 20 MG/100ML; MG/100ML; MG/100ML; MG/100ML
INJECTION, SOLUTION INTRAVENOUS CONTINUOUS
Status: DISCONTINUED | OUTPATIENT
Start: 2022-04-30 | End: 2022-04-30 | Stop reason: HOSPADM

## 2022-04-30 RX ORDER — OXYTOCIN/0.9 % SODIUM CHLORIDE 30/500 ML
340 PLASTIC BAG, INJECTION (ML) INTRAVENOUS CONTINUOUS PRN
Status: DISCONTINUED | OUTPATIENT
Start: 2022-04-30 | End: 2022-05-03 | Stop reason: HOSPADM

## 2022-04-30 RX ORDER — MISOPROSTOL 200 UG/1
400 TABLET ORAL
Status: DISCONTINUED | OUTPATIENT
Start: 2022-04-30 | End: 2022-04-30 | Stop reason: HOSPADM

## 2022-04-30 RX ORDER — ENOXAPARIN SODIUM 100 MG/ML
40 INJECTION SUBCUTANEOUS EVERY 24 HOURS
Status: DISCONTINUED | OUTPATIENT
Start: 2022-04-30 | End: 2022-05-03 | Stop reason: HOSPADM

## 2022-04-30 RX ORDER — METOCLOPRAMIDE 10 MG/1
10 TABLET ORAL EVERY 6 HOURS PRN
Status: DISCONTINUED | OUTPATIENT
Start: 2022-04-30 | End: 2022-05-03 | Stop reason: HOSPADM

## 2022-04-30 RX ORDER — PROCHLORPERAZINE 25 MG
25 SUPPOSITORY, RECTAL RECTAL EVERY 12 HOURS PRN
Status: DISCONTINUED | OUTPATIENT
Start: 2022-04-30 | End: 2022-05-03 | Stop reason: HOSPADM

## 2022-04-30 RX ORDER — LIDOCAINE 40 MG/G
CREAM TOPICAL
Status: DISCONTINUED | OUTPATIENT
Start: 2022-04-30 | End: 2022-05-03 | Stop reason: HOSPADM

## 2022-04-30 RX ORDER — CARBOPROST TROMETHAMINE 250 UG/ML
250 INJECTION, SOLUTION INTRAMUSCULAR
Status: DISCONTINUED | OUTPATIENT
Start: 2022-04-30 | End: 2022-05-03 | Stop reason: HOSPADM

## 2022-04-30 RX ORDER — OXYTOCIN/0.9 % SODIUM CHLORIDE 30/500 ML
340 PLASTIC BAG, INJECTION (ML) INTRAVENOUS CONTINUOUS PRN
Status: DISCONTINUED | OUTPATIENT
Start: 2022-04-30 | End: 2022-04-30 | Stop reason: HOSPADM

## 2022-04-30 RX ORDER — CARBOPROST TROMETHAMINE 250 UG/ML
250 INJECTION, SOLUTION INTRAMUSCULAR
Status: DISCONTINUED | OUTPATIENT
Start: 2022-04-30 | End: 2022-04-30 | Stop reason: HOSPADM

## 2022-04-30 RX ORDER — MISOPROSTOL 200 UG/1
800 TABLET ORAL
Status: DISCONTINUED | OUTPATIENT
Start: 2022-04-30 | End: 2022-05-03 | Stop reason: HOSPADM

## 2022-04-30 RX ORDER — IBUPROFEN 400 MG/1
800 TABLET, FILM COATED ORAL EVERY 6 HOURS
Status: DISCONTINUED | OUTPATIENT
Start: 2022-05-01 | End: 2022-05-03 | Stop reason: HOSPADM

## 2022-04-30 RX ORDER — TRANEXAMIC ACID 10 MG/ML
1 INJECTION, SOLUTION INTRAVENOUS EVERY 30 MIN PRN
Status: DISCONTINUED | OUTPATIENT
Start: 2022-04-30 | End: 2022-05-03 | Stop reason: HOSPADM

## 2022-04-30 RX ORDER — MORPHINE SULFATE 1 MG/ML
INJECTION, SOLUTION EPIDURAL; INTRATHECAL; INTRAVENOUS
Status: COMPLETED | OUTPATIENT
Start: 2022-04-30 | End: 2022-04-30

## 2022-04-30 RX ORDER — LIDOCAINE 40 MG/G
CREAM TOPICAL
Status: DISCONTINUED | OUTPATIENT
Start: 2022-04-30 | End: 2022-04-30 | Stop reason: HOSPADM

## 2022-04-30 RX ORDER — OXYTOCIN 10 [USP'U]/ML
10 INJECTION, SOLUTION INTRAMUSCULAR; INTRAVENOUS
Status: DISCONTINUED | OUTPATIENT
Start: 2022-04-30 | End: 2022-04-30 | Stop reason: HOSPADM

## 2022-04-30 RX ORDER — METHYLERGONOVINE MALEATE 0.2 MG/ML
200 INJECTION INTRAVENOUS
Status: DISCONTINUED | OUTPATIENT
Start: 2022-04-30 | End: 2022-04-30 | Stop reason: HOSPADM

## 2022-04-30 RX ORDER — ONDANSETRON 2 MG/ML
4 INJECTION INTRAMUSCULAR; INTRAVENOUS EVERY 6 HOURS PRN
Status: DISCONTINUED | OUTPATIENT
Start: 2022-04-30 | End: 2022-05-03 | Stop reason: HOSPADM

## 2022-04-30 RX ORDER — NALOXONE HYDROCHLORIDE 0.4 MG/ML
0.2 INJECTION, SOLUTION INTRAMUSCULAR; INTRAVENOUS; SUBCUTANEOUS
Status: DISCONTINUED | OUTPATIENT
Start: 2022-04-30 | End: 2022-05-03 | Stop reason: HOSPADM

## 2022-04-30 RX ORDER — GENTAMICIN SULFATE 60 MG/50ML
2 INJECTION, SOLUTION INTRAVENOUS
Status: COMPLETED | OUTPATIENT
Start: 2022-04-30 | End: 2022-04-30

## 2022-04-30 RX ORDER — METOCLOPRAMIDE HYDROCHLORIDE 5 MG/ML
10 INJECTION INTRAMUSCULAR; INTRAVENOUS EVERY 6 HOURS PRN
Status: DISCONTINUED | OUTPATIENT
Start: 2022-04-30 | End: 2022-05-03 | Stop reason: HOSPADM

## 2022-04-30 RX ORDER — OXYTOCIN/0.9 % SODIUM CHLORIDE 30/500 ML
100-340 PLASTIC BAG, INJECTION (ML) INTRAVENOUS CONTINUOUS PRN
Status: DISCONTINUED | OUTPATIENT
Start: 2022-04-30 | End: 2022-05-03 | Stop reason: HOSPADM

## 2022-04-30 RX ORDER — OXYCODONE HYDROCHLORIDE 5 MG/1
5 TABLET ORAL EVERY 4 HOURS PRN
Status: DISCONTINUED | OUTPATIENT
Start: 2022-04-30 | End: 2022-05-03 | Stop reason: HOSPADM

## 2022-04-30 RX ORDER — DEXTROSE, SODIUM CHLORIDE, SODIUM LACTATE, POTASSIUM CHLORIDE, AND CALCIUM CHLORIDE 5; .6; .31; .03; .02 G/100ML; G/100ML; G/100ML; G/100ML; G/100ML
INJECTION, SOLUTION INTRAVENOUS CONTINUOUS
Status: DISCONTINUED | OUTPATIENT
Start: 2022-04-30 | End: 2022-05-03 | Stop reason: HOSPADM

## 2022-04-30 RX ORDER — NALOXONE HYDROCHLORIDE 0.4 MG/ML
0.4 INJECTION, SOLUTION INTRAMUSCULAR; INTRAVENOUS; SUBCUTANEOUS
Status: DISCONTINUED | OUTPATIENT
Start: 2022-04-30 | End: 2022-05-03 | Stop reason: HOSPADM

## 2022-04-30 RX ORDER — AMOXICILLIN 250 MG
2 CAPSULE ORAL 2 TIMES DAILY
Status: DISCONTINUED | OUTPATIENT
Start: 2022-04-30 | End: 2022-05-03 | Stop reason: HOSPADM

## 2022-04-30 RX ORDER — SIMETHICONE 80 MG
80 TABLET,CHEWABLE ORAL 4 TIMES DAILY PRN
Status: DISCONTINUED | OUTPATIENT
Start: 2022-04-30 | End: 2022-05-03 | Stop reason: HOSPADM

## 2022-04-30 RX ORDER — CITRIC ACID/SODIUM CITRATE 334-500MG
30 SOLUTION, ORAL ORAL
Status: COMPLETED | OUTPATIENT
Start: 2022-04-30 | End: 2022-04-30

## 2022-04-30 RX ORDER — MISOPROSTOL 200 UG/1
800 TABLET ORAL
Status: DISCONTINUED | OUTPATIENT
Start: 2022-04-30 | End: 2022-04-30 | Stop reason: HOSPADM

## 2022-04-30 RX ORDER — KETOROLAC TROMETHAMINE 30 MG/ML
30 INJECTION, SOLUTION INTRAMUSCULAR; INTRAVENOUS EVERY 6 HOURS
Status: COMPLETED | OUTPATIENT
Start: 2022-04-30 | End: 2022-05-01

## 2022-04-30 RX ORDER — ACETAMINOPHEN 325 MG/1
975 TABLET ORAL EVERY 6 HOURS
Status: DISCONTINUED | OUTPATIENT
Start: 2022-04-30 | End: 2022-05-03 | Stop reason: HOSPADM

## 2022-04-30 RX ORDER — MODIFIED LANOLIN
OINTMENT (GRAM) TOPICAL
Status: DISCONTINUED | OUTPATIENT
Start: 2022-04-30 | End: 2022-05-03 | Stop reason: HOSPADM

## 2022-04-30 RX ADMIN — ACETAMINOPHEN 975 MG: 325 TABLET, FILM COATED ORAL at 13:38

## 2022-04-30 RX ADMIN — ENOXAPARIN SODIUM 40 MG: 40 INJECTION SUBCUTANEOUS at 21:39

## 2022-04-30 RX ADMIN — KETOROLAC TROMETHAMINE 30 MG: 30 INJECTION, SOLUTION INTRAMUSCULAR; INTRAVENOUS at 14:41

## 2022-04-30 RX ADMIN — ACETAMINOPHEN 975 MG: 325 TABLET ORAL at 07:07

## 2022-04-30 RX ADMIN — ONDANSETRON 4 MG: 2 INJECTION INTRAMUSCULAR; INTRAVENOUS at 08:05

## 2022-04-30 RX ADMIN — NALBUPHINE HYDROCHLORIDE 5 MG: 20 INJECTION, SOLUTION INTRAMUSCULAR; INTRAVENOUS; SUBCUTANEOUS at 14:40

## 2022-04-30 RX ADMIN — KETOROLAC TROMETHAMINE 15 MG: 30 INJECTION, SOLUTION INTRAMUSCULAR at 08:17

## 2022-04-30 RX ADMIN — ACETAMINOPHEN 975 MG: 325 TABLET, FILM COATED ORAL at 19:16

## 2022-04-30 RX ADMIN — SODIUM CHLORIDE, POTASSIUM CHLORIDE, SODIUM LACTATE AND CALCIUM CHLORIDE: 600; 310; 30; 20 INJECTION, SOLUTION INTRAVENOUS at 06:20

## 2022-04-30 RX ADMIN — KETOROLAC TROMETHAMINE 30 MG: 30 INJECTION, SOLUTION INTRAMUSCULAR; INTRAVENOUS at 21:39

## 2022-04-30 RX ADMIN — PHENYLEPHRINE HYDROCHLORIDE 0.5 MCG/KG/MIN: 10 INJECTION INTRAVENOUS at 07:38

## 2022-04-30 RX ADMIN — CLINDAMYCIN PHOSPHATE 900 MG: 900 INJECTION, SOLUTION INTRAVENOUS at 07:15

## 2022-04-30 RX ADMIN — Medication 340 ML/HR: at 07:58

## 2022-04-30 RX ADMIN — GENTAMICIN SULFATE 120 MG: 60 INJECTION, SOLUTION INTRAVENOUS at 07:30

## 2022-04-30 RX ADMIN — BUPIVACAINE HYDROCHLORIDE IN DEXTROSE 1.6 ML: 7.5 INJECTION, SOLUTION SUBARACHNOID at 07:38

## 2022-04-30 RX ADMIN — SODIUM CHLORIDE, SODIUM LACTATE, POTASSIUM CHLORIDE, CALCIUM CHLORIDE AND DEXTROSE MONOHYDRATE: 5; 600; 310; 30; 20 INJECTION, SOLUTION INTRAVENOUS at 11:40

## 2022-04-30 RX ADMIN — SODIUM CITRATE AND CITRIC ACID MONOHYDRATE 30 ML: 500; 334 SOLUTION ORAL at 07:07

## 2022-04-30 RX ADMIN — MORPHINE SULFATE 0.15 MG: 1 INJECTION, SOLUTION EPIDURAL; INTRATHECAL; INTRAVENOUS at 07:38

## 2022-04-30 RX ADMIN — PHENYLEPHRINE HYDROCHLORIDE 100 MCG: 10 INJECTION INTRAVENOUS at 08:45

## 2022-04-30 RX ADMIN — SENNOSIDES AND DOCUSATE SODIUM 1 TABLET: 50; 8.6 TABLET ORAL at 21:39

## 2022-04-30 RX ADMIN — SODIUM CHLORIDE, POTASSIUM CHLORIDE, SODIUM LACTATE AND CALCIUM CHLORIDE: 600; 310; 30; 20 INJECTION, SOLUTION INTRAVENOUS at 07:32

## 2022-04-30 ASSESSMENT — ACTIVITIES OF DAILY LIVING (ADL)
ADLS_ACUITY_SCORE: 10
ADLS_ACUITY_SCORE: 12
ADLS_ACUITY_SCORE: 10
ADLS_ACUITY_SCORE: 12
ADLS_ACUITY_SCORE: 10
ADLS_ACUITY_SCORE: 10

## 2022-04-30 NOTE — ANESTHESIA POSTPROCEDURE EVALUATION
Patient: Rosemary Davis    Procedure: Procedure(s):  REPEAT  SECTION       Anesthesia Type:  Spinal    Note:     Postop Pain Control: Uneventful            Sign Out: Well controlled pain   PONV: No   Neuro/Psych: Uneventful            Sign Out: Acceptable/Baseline neuro status   Airway/Respiratory: Uneventful            Sign Out: Acceptable/Baseline resp. status   CV/Hemodynamics: Uneventful            Sign Out: Acceptable CV status   Other NRE: NONE   DID A NON-ROUTINE EVENT OCCUR? No           Last vitals:  Vitals Value Taken Time   /73 22 1100   Temp 36.7  C (98  F) 22 1100   Pulse 96 22 1000   Resp 16 22 1100   SpO2 98 % 22 1100       Electronically Signed By: Kishore Martinez MD  2022  12:20 PM

## 2022-04-30 NOTE — PLAN OF CARE
Patient up to 4th floor around 1115.  Oriented to room and call light.  Denies pain but complains of generalized itching.  Nubain IV given for comfort.  Nielson output 125 ml.  Tolerating clear liquids with crackers.  Up to bathroom with stand by assist x 1, slightly unsteady.  Using breast pump.  Patient teary this morning but appears more calm and pleasant this afternoon.   at home due to COVID.  Encouraged to call with questions/concerns.

## 2022-04-30 NOTE — OP NOTE
Geoffrey Trujillo MD   Physician   OB/Gyn   L&D Delivery Note      Signed   Date of Service:  2022  8:20 AM   Creation Time:  2022  8:20 AM              Expand All Collapse All        []Hide copied text    []Lyric for details    OB  Delivery Note        Rosemary Davis MRN# [id number]   Age: 32 year old YOB: 1990         GA: 39w0d  GP:   Labor Complications: none  Delivery QBL:  870  Delivery Type:  section  ROM to Delivery Time: rupture date or rupture time have not been documented       1 Minute 5 Minute 10 Minute   Apgar Totals: pending pending       Personel Present: NICU  Apgar pending from NICU      Details     Pre-Op Diagnosis: 1. Intrauterine pregnancy at 39w0d   Previous c/s x 1- desires repeat   Suspected large for gestational age  +Covid testing preoperatively  Fetus with UTD A1   Post-Op Diagnosis: 1. same   Indications:   desires repeat   Procedure: RLTCS   Anesthesia: spinal      Informed Consent:  The risks, benefits, complications, and alternatives were discussed with the patient. The patient understood that the risks of  section include, but are not limited to: injury to nearby structures or organs, infection, blood loss and possible need for transfusion, and potential need for more surgery including hysterectomy. The patient stated understanding and desired to proceed. All questions were answered. The site of surgery was properly noted and marked. The patient was identified as Rosemary Davis and the procedure verified as a  delivery. A Time Out was held and the above information confirmed.     Procedure Details:  The patient was taken to the operating room where spinal anesthesia was found to be adequate. Antibiotics were given for infection prophylaxis. She was prepped and draped in the normal sterile fashion in the dorsal supine position with leftward tilt. A Pfannenstiel skin incision was made with  scalpel. The incision was carried down to the fascia. The fascia was incised and extended laterally with Cleary scissors. The inferior aspect of the fascia was grasped with Kocher clamps. The underlying rectus and pyramidalis muscles were dissected off with Cleary scissors. In a similar fashion, the superior aspect of the fascia was elevated with Kocher clamps, and the rectus muscle was dissected off. The rectus muscles were  bluntly down the midline down to the level of the pubic symphysis. The peritoneum was identified and entered bluntly. Peritoneal incision was extended superiorly and inferiorly with good visualization of the bladder.     The bladder blade was inserted. The lower uterine segment was then incised with a scalpel incision and was extended bluntly. The amniotic sac was ruptured and   color noted. The bladder blade was removed and the infant was delivered atraumatically using the usual maneuvers. The remainder of the infant was delivered, the cord clamped and cut, and the baby was handed off to the awaiting clinicians.      The placenta was removed via manual massage. The uterus was then exteriorized and cleared of all clots and debris. The hysterotomy was repaired with suture of 0 Moncryl in a running locked fashion. A second imbricating layer of the same suture was placed and good hemostasis observed. The ovaries and tubes appeared normal bilaterally. The uterus, tubes, and ovaries were then returned to the abdomen and pelvis. The uterine incision was reinspected and found to be hemostatic. The subfascial spaces were inspected and noted to be hemostatic. Interceed was placed over the uterine incision. Peritoneum and muscle layers with 3-0 monocryl. The fascia was then reapproximated with two running sutures of 0 Vicryl tied at the midline. The skin was closed with 4-0 Monocryl sutures and surgical glue.     The patient tolerated the procedure well. Sponge, instrument, and needle counts were  correct and the patient was taken to the recovery room in good and stable condition.              Labor Events[]Expand by Default             Ryan Female-Rosemary [[id number]]              Delivery/Placenta Date and Time              Delivery Date: 4/30/22 Delivery Time:  7:58 AM                                       Delivery (Maternal) (Provider to Complete) ([id number])                      Blood Loss  Mother: Pari Davis Anastasia #[id number]          Start of Mother's Information           Delivery Blood Loss  04/30/22 0753 - 04/30/22 0820            Total Surgical QBL Blood Loss (mL) Hospital Encounter 870 mL     Total   870 mL                   End of Mother's Information  Mother: Pari Davis Anastasia #[id number]                        Geoffrey Trujillo MD

## 2022-04-30 NOTE — ANESTHESIA PROCEDURE NOTES
Intrathecal Procedure Note    Pre-Procedure   Staff -        Anesthesiologist:  French Rome MD       Performed By: anesthesiologist       Location: OR       Pre-Anesthestic Checklist: patient identified, IV checked, site marked, risks and benefits discussed, informed consent, monitors and equipment checked, pre-op evaluation, at physician/surgeon's request and post-op pain management  Timeout:       Correct Patient: Yes        Correct Procedure: Yes        Correct Site: Yes        Correct Position: Yes   Procedure Documentation  Procedure: intrathecal       Patient Position: sitting       Patient Prep/Sterile Barriers: sterile gloves, mask, patient draped       Skin prep: Chloraprep       Insertion Site: L4-5. (midline approach).       Spinal Needle Type: Mackenzie tip       Introducer used       Introducer: 20 G       # of attempts: 1 and  # of redirects:  0    Assessment/Narrative         Paresthesias: No.       CSF fluid: clear.    Medication(s) Administered   0.75% Hyperbaric Bupivacaine (Intrathecal) - Intrathecal   1.6 mL - 4/30/2022 7:38:00 AM  Morphine PF 1 mg/mL (Intrathecal) - Intrathecal   0.15 mg - 4/30/2022 7:38:00 AM   Comments:  Patient tolerated well.  Pre and post aspiration.  No complications.  0.75% Bupivacaine and Duramorph documented in record.

## 2022-04-30 NOTE — PROVIDER NOTIFICATION
Dr. Trujillo notified of patient's blood pressure, pulse, and low urine output. Orders received to keep sanz in until patient diureses.

## 2022-04-30 NOTE — L&D DELIVERY NOTE
OB  Delivery Note      Rosemary Davis MRN# [id number]   Age: 32 year old YOB: 1990       GA: 39w0d  GP:   Labor Complications: none  Delivery QBL:  870  Delivery Type:  section  ROM to Delivery Time: rupture date or rupture time have not been documented     1 Minute 5 Minute 10 Minute   Apgar Totals: pending pending       Personel Present: NICU  Apgar pending from NICU     Details    Pre-Op Diagnosis: 1. Intrauterine pregnancy at 39w0d   Previous c/s x 1- desires repeat   Suspected large for gestational age  +Covid testing preoperatively  Fetus with UTD A1   Post-Op Diagnosis: 1. same   Indications:   desires repeat   Procedure: RLTCS   Anesthesia: spinal     Informed Consent:  The risks, benefits, complications, and alternatives were discussed with the patient. The patient understood that the risks of  section include, but are not limited to: injury to nearby structures or organs, infection, blood loss and possible need for transfusion, and potential need for more surgery including hysterectomy. The patient stated understanding and desired to proceed. All questions were answered. The site of surgery was properly noted and marked. The patient was identified as Rosemary Davis and the procedure verified as a  delivery. A Time Out was held and the above information confirmed.    Procedure Details:  The patient was taken to the operating room where spinal anesthesia was found to be adequate. Antibiotics were given for infection prophylaxis. She was prepped and draped in the normal sterile fashion in the dorsal supine position with leftward tilt. A Pfannenstiel skin incision was made with scalpel. The incision was carried down to the fascia. The fascia was incised and extended laterally with Cleary scissors. The inferior aspect of the fascia was grasped with Kocher clamps. The underlying rectus and pyramidalis muscles were dissected off with  Evin scissors. In a similar fashion, the superior aspect of the fascia was elevated with Kocher clamps, and the rectus muscle was dissected off. The rectus muscles were  bluntly down the midline down to the level of the pubic symphysis. The peritoneum was identified and entered bluntly. Peritoneal incision was extended superiorly and inferiorly with good visualization of the bladder.    The bladder blade was inserted. The lower uterine segment was then incised with a scalpel incision and was extended bluntly. The amniotic sac was ruptured and   color noted. The bladder blade was removed and the infant was delivered atraumatically using the usual maneuvers. The remainder of the infant was delivered, the cord clamped and cut, and the baby was handed off to the awaiting clinicians.     The placenta was removed via manual massage. The uterus was then exteriorized and cleared of all clots and debris. The hysterotomy was repaired with suture of 0 Moncryl in a running locked fashion. A second imbricating layer of the same suture was placed and good hemostasis observed. The ovaries and tubes appeared normal bilaterally. The uterus, tubes, and ovaries were then returned to the abdomen and pelvis. The uterine incision was reinspected and found to be hemostatic. The subfascial spaces were inspected and noted to be hemostatic. Interceed was placed over the uterine incision. Peritoneum and muscle layers with 3-0 monocryl. The fascia was then reapproximated with two running sutures of 0 Vicryl tied at the midline. The skin was closed with 4-0 Monocryl sutures and surgical glue.    The patient tolerated the procedure well. Sponge, instrument, and needle counts were correct and the patient was taken to the recovery room in good and stable condition.       Indering, Female-Rosemary [[id number]]    Delivery/Placenta Date and Time    Delivery Date: 4/30/22 Delivery Time:  7:58 AM           Delivery (Maternal) (Provider to  Complete) ([id number])       Blood Loss  Mother: Pari aDvis Anastasia #[id number]   Start of Mother's Information    Delivery Blood Loss  04/30/22 0753 - 04/30/22 0820    Total Surgical QBL Blood Loss (mL) Hospital Encounter 870 mL    Total  870 mL         End of Mother's Information  Mother: Pari Davis Anastasia #[id number]                 Geoffrey Trujillo MD

## 2022-04-30 NOTE — ANESTHESIA PREPROCEDURE EVALUATION
Anesthesia Pre-Procedure Evaluation    Patient: Rosemary Davis   MRN: [id number] : 1990        Procedure : Procedure(s):  REPEAT  SECTION          Past Medical History:   Diagnosis Date     Ataxia      Depressive disorder      Humerus fracture initial injury  2012    ORIF left humerus in 2012     Missed        Past Surgical History:   Procedure Laterality Date      SECTION N/A 2018    Procedure:  SECTION;  Surgeon: Geoffrey Trujillo MD;  Location:  L+D     DENTAL SURGERY      wisdom extraction      EYE SURGERY      muscle repair for wandering eye--bilat     OPEN REDUCTION INTERNAL FIXATION ELBOW  2012    Procedure...:OPEN REDUCTION INTERNAL FIXATION ELBOW; REPAIR OF NON-UNION, LEFT ELBOW ; Surgeon:BREE NAVARRETE; Location: OR     OPEN REDUCTION INTERNAL FIXATION HUMERUS DISTAL  2012    Procedure:OPEN REDUCTION INTERNAL FIXATION HUMERUS DISTAL; ORIF LEFT DISTAL HUMERUS FRACTURE. SYNTHES ELBOW PLATES, MINI C-ARM; Surgeon:BREE NAVARRETE; Location: OR      Allergies   Allergen Reactions     Sulfa Drugs Nausea and Vomiting     Cold/shivering/conjunctival injection     Amoxicillin Hives      Social History     Tobacco Use     Smoking status: Never Smoker     Smokeless tobacco: Never Used   Substance Use Topics     Alcohol use: Not Currently      Wt Readings from Last 1 Encounters:   22 87.1 kg (192 lb)        Anesthesia Evaluation            ROS/MED HX  ENT/Pulmonary:  - neg pulmonary ROS     Neurologic: Comment: ataxia      Cardiovascular:       METS/Exercise Tolerance: >4 METS    Hematologic:       Musculoskeletal:       GI/Hepatic:     (+) GERD,     Renal/Genitourinary:       Endo:     (+) Obesity,  (-) Type II DM   Psychiatric/Substance Use:     (+) psychiatric history depression     Infectious Disease:       Malignancy:       Other:            Physical Exam    Airway        Mallampati: II   TM distance: > 3 FB   Neck ROM: full    Mouth opening: > 3 cm    Respiratory Devices and Support         Dental  no notable dental history         Cardiovascular   cardiovascular exam normal          Pulmonary   pulmonary exam normal                OUTSIDE LABS:  CBC:   Lab Results   Component Value Date    WBC 8.0 11/13/2018    WBC 9.4 09/20/2017    HGB 10.1 (L) 04/30/2022    HGB 7.3 (L) 11/15/2018    HCT 35.5 11/13/2018    HCT 43.5 09/20/2017     11/13/2018     09/20/2017     BMP:   Lab Results   Component Value Date     12/15/2021     10/17/2012    POTASSIUM 3.8 12/15/2021    POTASSIUM 4.0 10/17/2012    CHLORIDE 100 12/15/2021    CHLORIDE 99 10/17/2012    CO2 21 01/27/2012    BUN 7 12/15/2021    BUN 14 12/15/2021    CR 0.50 (L) 12/15/2021    CR 0.47 (L) 10/17/2012    GLC 86 12/15/2021    GLC 86 09/20/2017     COAGS: No results found for: PTT, INR, FIBR  POC:   Lab Results   Component Value Date    HCG Negative 05/25/2012     HEPATIC:   Lab Results   Component Value Date    ALBUMIN 4.5 10/17/2012    PROTTOTAL 7.7 10/17/2012     (H) 10/17/2012     (H) 10/17/2012    ALKPHOS 88 10/17/2012    BILITOTAL 0.2 10/17/2012     OTHER:   Lab Results   Component Value Date    KATHRYN 9.0 12/15/2021    MAG 1.9 10/17/2012    T4 11.0 10/17/2012    SED 22 (A) 10/17/2012       Anesthesia Plan    ASA Status:  2      Anesthesia Type: Spinal.              Consents    Anesthesia Plan(s) and associated risks, benefits, and realistic alternatives discussed. Questions answered and patient/representative(s) expressed understanding.    - Discussed:     - Discussed with:  Patient      - Extended Intubation/Ventilatory Support Discussed: No.      - Patient is DNR/DNI Status: No    Use of blood products discussed: Yes.     - Discussed with: Patient.     - Consented: consented to blood products            Reason for refusal: other.     Postoperative Care    Pain management: Multi-modal analgesia.   PONV prophylaxis: Ondansetron (or other 5HT-3)      Comments:    Other Comments: Patient is advised on what to expect with a SAB during C/S. Advised that hypotension is attempted to be avoided with a phenylephrine gtt.  Advised that tugging, pressure and cramping are all normal sensations that are possible to experience during C/S. Advised that we are always prepared to convert to a GA if the clinical situation dictates as such.             French Rome MD

## 2022-04-30 NOTE — ANESTHESIA CARE TRANSFER NOTE
Patient: Rosemary Davis    Procedure: Procedure(s):  REPEAT  SECTION       Diagnosis: Previous  section [Z98.891]  Diagnosis Additional Information: No value filed.    Anesthesia Type:   Spinal     Note:    Oropharynx: oropharynx clear of all foreign objects  Level of Consciousness: awake  Oxygen Supplementation: room air    Independent Airway: airway patency satisfactory and stable  Dentition: dentition unchanged  Vital Signs Stable: post-procedure vital signs reviewed and stable  Report to RN Given: handoff report given  Patient transferred to: Labor and Delivery    Handoff Report: Identifed the Patient, Identified the Reponsible Provider, Reviewed the pertinent medical history, Discussed the surgical course, Reviewed Intra-OP anesthesia mangement and issues during anesthesia, Set expectations for post-procedure period and Allowed opportunity for questions and acknowledgement of understanding      Vitals:  Vitals Value Taken Time   /62 22 0845   Temp     Pulse 72    Resp     SpO2 100    Vitals shown include unvalidated device data.    Electronically Signed By: AMELIE Hammond CRNA  2022  8:47 AM

## 2022-05-01 LAB
HGB BLD-MCNC: 7 G/DL (ref 11.7–15.7)
HGB BLD-MCNC: 7.1 G/DL (ref 11.7–15.7)

## 2022-05-01 PROCEDURE — 85018 HEMOGLOBIN: CPT | Performed by: STUDENT IN AN ORGANIZED HEALTH CARE EDUCATION/TRAINING PROGRAM

## 2022-05-01 PROCEDURE — 250N000013 HC RX MED GY IP 250 OP 250 PS 637: Performed by: OBSTETRICS & GYNECOLOGY

## 2022-05-01 PROCEDURE — 85018 HEMOGLOBIN: CPT | Performed by: OBSTETRICS & GYNECOLOGY

## 2022-05-01 PROCEDURE — 250N000011 HC RX IP 250 OP 636: Performed by: STUDENT IN AN ORGANIZED HEALTH CARE EDUCATION/TRAINING PROGRAM

## 2022-05-01 PROCEDURE — 36415 COLL VENOUS BLD VENIPUNCTURE: CPT | Performed by: STUDENT IN AN ORGANIZED HEALTH CARE EDUCATION/TRAINING PROGRAM

## 2022-05-01 PROCEDURE — 250N000011 HC RX IP 250 OP 636: Performed by: OBSTETRICS & GYNECOLOGY

## 2022-05-01 PROCEDURE — 258N000003 HC RX IP 258 OP 636: Performed by: STUDENT IN AN ORGANIZED HEALTH CARE EDUCATION/TRAINING PROGRAM

## 2022-05-01 PROCEDURE — 250N000011 HC RX IP 250 OP 636: Performed by: ANESTHESIOLOGY

## 2022-05-01 PROCEDURE — 36415 COLL VENOUS BLD VENIPUNCTURE: CPT | Performed by: OBSTETRICS & GYNECOLOGY

## 2022-05-01 PROCEDURE — 120N000012 HC R&B POSTPARTUM

## 2022-05-01 RX ADMIN — IRON SUCROSE 200 MG: 20 INJECTION, SOLUTION INTRAVENOUS at 22:45

## 2022-05-01 RX ADMIN — KETOROLAC TROMETHAMINE 30 MG: 30 INJECTION, SOLUTION INTRAMUSCULAR; INTRAVENOUS at 03:38

## 2022-05-01 RX ADMIN — IBUPROFEN 800 MG: 400 TABLET ORAL at 14:34

## 2022-05-01 RX ADMIN — IBUPROFEN 800 MG: 400 TABLET ORAL at 09:13

## 2022-05-01 RX ADMIN — IBUPROFEN 800 MG: 400 TABLET ORAL at 21:48

## 2022-05-01 RX ADMIN — ACETAMINOPHEN 975 MG: 325 TABLET, FILM COATED ORAL at 19:00

## 2022-05-01 RX ADMIN — ACETAMINOPHEN 975 MG: 325 TABLET, FILM COATED ORAL at 06:06

## 2022-05-01 RX ADMIN — ACETAMINOPHEN 975 MG: 325 TABLET, FILM COATED ORAL at 01:17

## 2022-05-01 RX ADMIN — SENNOSIDES AND DOCUSATE SODIUM 1 TABLET: 50; 8.6 TABLET ORAL at 09:13

## 2022-05-01 RX ADMIN — NALBUPHINE HYDROCHLORIDE 5 MG: 20 INJECTION, SOLUTION INTRAMUSCULAR; INTRAVENOUS; SUBCUTANEOUS at 03:38

## 2022-05-01 RX ADMIN — SENNOSIDES AND DOCUSATE SODIUM 1 TABLET: 50; 8.6 TABLET ORAL at 21:48

## 2022-05-01 RX ADMIN — ACETAMINOPHEN 975 MG: 325 TABLET, FILM COATED ORAL at 11:50

## 2022-05-01 ASSESSMENT — ACTIVITIES OF DAILY LIVING (ADL)
ADLS_ACUITY_SCORE: 12

## 2022-05-01 NOTE — PROGRESS NOTES
Post Partum Progress Note -  Section    Subjective:   The patient feels well. Has no complaints. Denies dizziness with standing, palpitations, weakness, fatigue..  Ambulating:  Yes with assistance of walker (uses cane at home)  Voiding: yes  Passing flatus: yes  Tolerating regular diet: yes  Pain well controlled: yes.    Subjective pain ratin out of 10  Lochia: normal    Infant status: in NICU  Feeding via breast and bottle. Pt is pumping, but has not had much supply. Difficulty with supply in prior pregnancy, encouraged consultation with lactation specialist to facilitate patient's goal of attempting breast feeding.    Objective:     Vitals:    22 1916 22 0113 22 0336 22 0744   BP: 129/82 115/77 113/83 128/77   BP Location: Left arm Left arm Left arm    Patient Position:  Supine Semi-Hyde's    Pulse: 102 97 104 89   Resp: 16 18 18 16   Temp: 98  F (36.7  C) 98.1  F (36.7  C) 97.8  F (36.6  C) 98.3  F (36.8  C)   TempSrc: Oral Oral Oral Oral   SpO2: 98% 98% 98% 98%   Weight:       Height:           Physical Exam:  General: No acute distress, alert and oriented X 3.   Heart: RRR, No murmurs, rubs or gallop.   Lungs: Clear breath sounds bilaterally.  Adequate effort.   Abdomen:  Soft, firm fundus at umbilicus. Appropriately tender.     Incision   C/D&I, well approximated without drainage.   Breast No erythema.  No evidence of infection   Extremities no calf tenderness; edema: trace   Perineum N/A   Lochia  None/Light       Assessment & Plan   Rosemary Anastasia Frailing is a 32 year old  s/p  repeat c/s due to history of  delivery at 39w1d on 2022.      1.  Post Operative Day 1  - not yet meeting postop goals  - encouraged increased ambulation today, use of incentive spirometer, pain control, and increased PO intake  - baby in NICU    2. COVID+  - pt with mild cough  - afebrile, O2 sat wnl  - incentive spirometer at bedside, pt instructed on use.    3. Cerebellar  ataxia  - uses cane at home  - using walker postop for additional stability. Discussed option for PT and home use of walker if desired. Pt will consider after seeing how today goes.    4. Anemia, chronic iron deficiency with acute blood loss  -  intraop and 200mL postop  - pt currently asymptomatic, bleeding wnl  - hbg 10.0 > 7.1, redraw hbg at 2pm. Discussed options for IV iron vs blood transfusion if hbg continues to decrease. Will reassess after repeat labs this afternoon.    Activity:  Encouraged pelvic rest x6 weeks.  Continue post partum care    Dispo: anticipate discharge to home on PPD3    Lynn Manzano MD  5/1/2022 10:56 AM

## 2022-05-01 NOTE — PLAN OF CARE
Dr. Manzano notified at bedside of hgb = 7.1. Verbal orders received for redraw at 1400 and to page results. Pt currently asymptomatic. Will monitor for any changes.

## 2022-05-01 NOTE — PROGRESS NOTES
Contacted Lab at 1730 about Lab Draw for HGB. Still not collected at 6:50 PM. Will continue to you follow up. Recalled at 1930.

## 2022-05-01 NOTE — PLAN OF CARE
VSS. Fundus firm and midline. Scant flow. Pain 4/10, controled with tylenol and toradol per MAR. Incision open to air. Pumping for baby in NICU. Ambulating free of dizziness and lightheaded with stand by assist. Nielson patent, removed at 2230. Bowel sounds active, not passing gas. Encouraged to call with needs, questions, or concerns. Will continue to monitor.

## 2022-05-01 NOTE — LACTATION NOTE
Initial visit.   Breastfeeding general information reviewed.   Advised to pump 8-12x/day for baby in NICU.  Mother states she has pumped 3 or 4 times since birth and getting nothing a few drops.  LC explained the importance of simulating the breast and hand expressing after pumping .  Explained benefits of holding and skin to skin.  Encouraged lots of skin to skin. Instructed on hand expression.   Outpatient resources reviewed.   Has a breast pump for home.  Questions answered regarding pumping and physiology of milk supply and production. No further questions at this time.   Will follow as needed.   Leslie Estrella BSN, RN, PHN, RNC-MNN, IBCLC

## 2022-05-01 NOTE — PLAN OF CARE
Pt requesting walker to use in hospital due to hx ataxia. States uses a cane at home, but will feel more stable post-C/S with walker. Observed pt ambulating to BRM using walker independently. Able to do isidro care and get in/out of bed independently. Encouraged pt to call if needing help ambulating or in BRM. Per MD request, incentive spirometer also brought in and instructed pt on use. Pt was able to demonstrate use x5. Encouraged to use every hour. Continue to monitor and assess.

## 2022-05-01 NOTE — PLAN OF CARE
HR tachy at times.  Other vss.  Pt is passing flatus per her report.  LS LUIS due to PAPR flanagan.   Patient unsteady on feet and became increasingly fatigued as night has progressed.  History or ataxia.  Incision intact with liquid bandage.  PCD's in place.  Tylenol and toradol controlling pain adequately.  Declined to pump overnight.  Intermittent productive cough.  Tolerating PO.  UO adequate.  Will continue to monitor.

## 2022-05-01 NOTE — PLAN OF CARE
VSS. States pain is well controlled with Tylenol and Ibuprofen. Ambulating to BRM independently with walker. Voiding adequately, bleeding minimal. Using IS due to covid+ and cough. Pumping. Hgb = 7.1, asymptomatic. Redraw scheduled for 1400. Continue to monitor.

## 2022-05-02 LAB — HGB BLD-MCNC: 7.4 G/DL (ref 11.7–15.7)

## 2022-05-02 PROCEDURE — 85018 HEMOGLOBIN: CPT | Performed by: OBSTETRICS & GYNECOLOGY

## 2022-05-02 PROCEDURE — 36415 COLL VENOUS BLD VENIPUNCTURE: CPT | Performed by: OBSTETRICS & GYNECOLOGY

## 2022-05-02 PROCEDURE — 250N000013 HC RX MED GY IP 250 OP 250 PS 637: Performed by: OBSTETRICS & GYNECOLOGY

## 2022-05-02 PROCEDURE — 250N000011 HC RX IP 250 OP 636: Performed by: OBSTETRICS & GYNECOLOGY

## 2022-05-02 PROCEDURE — 120N000012 HC R&B POSTPARTUM

## 2022-05-02 RX ORDER — ALBUTEROL SULFATE 90 UG/1
2 AEROSOL, METERED RESPIRATORY (INHALATION) EVERY 6 HOURS PRN
Status: DISCONTINUED | OUTPATIENT
Start: 2022-05-02 | End: 2022-05-03 | Stop reason: HOSPADM

## 2022-05-02 RX ORDER — GUAIFENESIN 600 MG/1
600 TABLET, EXTENDED RELEASE ORAL 2 TIMES DAILY
Status: DISCONTINUED | OUTPATIENT
Start: 2022-05-02 | End: 2022-05-03 | Stop reason: HOSPADM

## 2022-05-02 RX ORDER — FERROUS SULFATE 325(65) MG
325 TABLET ORAL DAILY
Status: DISCONTINUED | OUTPATIENT
Start: 2022-05-02 | End: 2022-05-03 | Stop reason: HOSPADM

## 2022-05-02 RX ADMIN — IBUPROFEN 800 MG: 400 TABLET ORAL at 12:50

## 2022-05-02 RX ADMIN — OXYCODONE HYDROCHLORIDE 5 MG: 5 TABLET ORAL at 10:31

## 2022-05-02 RX ADMIN — GUAIFENESIN 600 MG: 600 TABLET ORAL at 09:42

## 2022-05-02 RX ADMIN — ACETAMINOPHEN 975 MG: 325 TABLET, FILM COATED ORAL at 06:09

## 2022-05-02 RX ADMIN — ACETAMINOPHEN 975 MG: 325 TABLET, FILM COATED ORAL at 19:38

## 2022-05-02 RX ADMIN — ACETAMINOPHEN 975 MG: 325 TABLET, FILM COATED ORAL at 00:30

## 2022-05-02 RX ADMIN — ENOXAPARIN SODIUM 40 MG: 40 INJECTION SUBCUTANEOUS at 22:00

## 2022-05-02 RX ADMIN — FERROUS SULFATE TAB 325 MG (65 MG ELEMENTAL FE) 325 MG: 325 (65 FE) TAB at 09:42

## 2022-05-02 RX ADMIN — IBUPROFEN 800 MG: 400 TABLET ORAL at 19:38

## 2022-05-02 RX ADMIN — SENNOSIDES AND DOCUSATE SODIUM 2 TABLET: 50; 8.6 TABLET ORAL at 19:38

## 2022-05-02 RX ADMIN — ACETAMINOPHEN 975 MG: 325 TABLET, FILM COATED ORAL at 12:50

## 2022-05-02 RX ADMIN — SENNOSIDES AND DOCUSATE SODIUM 2 TABLET: 50; 8.6 TABLET ORAL at 07:27

## 2022-05-02 RX ADMIN — IBUPROFEN 800 MG: 400 TABLET ORAL at 06:07

## 2022-05-02 RX ADMIN — GUAIFENESIN 600 MG: 600 TABLET ORAL at 21:59

## 2022-05-02 ASSESSMENT — ACTIVITIES OF DAILY LIVING (ADL)
ADLS_ACUITY_SCORE: 12

## 2022-05-02 NOTE — PROVIDER NOTIFICATION
MD Calvin notified of hgb result of 7.4, no increased symptoms. No new orders at this time. Will continue to monitor.

## 2022-05-02 NOTE — PLAN OF CARE
Vital signs stable, afebrile, O2 sats 98% on RA, voiding without difficultly, incision is CDI with liquid bandage, FFU/2 scant rubra lochia, using an abdominal binder for incisional support prn, IV SL'd after receiving Venofer dose for hgb of 7.0- MD aware, trace dependent LE edema, lungs clear but patient has a dry cough, BS+ tolerating a regular diet, able to ambulate free of dizziness using a walker per her request, able to rest, pain well controlled with medications, rates her pain 1-3/10, patient did not pump EBM for  in the NICU overnight. Continued Covid precautions per protocol.  is home overnight.

## 2022-05-02 NOTE — PROVIDER NOTIFICATION
MD Notification    Notified Person: MD    Notified Person Name: Lynn Garibay     Notification Date/Time: May 1, 2022 9:07 PM    Notification Interaction: [employer] Message- pager    Purpose of Notification: HGB value 7.0    Orders Received: provider called back will place order.     Comments:

## 2022-05-02 NOTE — PLAN OF CARE
Vitals Signs stable. Dry Cough, horase voice, hot fluids helpful. Afebrile. Educated on COVID symptom management. Incision DANIELLA. Ice applied. Pain managed with Tylenol and Ibuprofen. Showered this shift. Up independently in room with walker. Uses cane at baseline. Voiding. Fundus Firm. Pt being updated by NICU about baby. Pt hopeful for baby to be able to come up and stay with her. Tearful at end of shift, emotional support provided.  HGB level, provider notified, see provider notification.

## 2022-05-02 NOTE — PROGRESS NOTES
OB Progress Note    A/P:  Pt is a 32 year old  POD#2 s/p RLTCS at 39w2d due to history of prior  section. +COVID with mild symptoms.     1. Continue routine post-op and post-partum care.   - baby in NICU. Anticipate moving into room in next 1-2 days  2. Breastfeeding/pumping. Encouraged pumping and/or hand expression every 2-3 hours while awake and at least every 4 hours at night.   3. COVID+: afebrile. VS WNL. having persistent cough. Start mucinex for congestion. Consult Resp Therapy for initiation of albuterol PRN. Continue incentive spirometer  4. Acute on chronic anemia: s/p venofer. hgb stable now. Asymptomatic. Start daily oral iron and continue on discharge.   5. Cerebellar ataxia: uses cane at home.  using walker postop for additional stability. Discussed option for PT and home use of walker if desired. Pt will consider after seeing how today goes.  6. Rubella immune, Rh positive, rhogam is not indicated  7. DVT ppx: lovenox daily while inpatient  8. Will plan for discharge home POD#3-4.    Full Code    S:  Pt feeling well. Pain adequately controlled, though a little more than the day prior. Denies CP/SOB/N/V/HA. Ambulating and urinating without difficulty and tolerating PO. Lochia normal. Pt is passing flatus. Breastfeeding/pumping.  Having persistent dry cough, especially at night when lying down. Denies SOB,CP.       O: /86 (BP Location: Left arm)   Pulse 83   Temp 97.5  F (36.4  C) (Oral)   Resp 16   Ht 1.524 m (5')   Wt 87.1 kg (192 lb)   LMP 2021   SpO2 98%   BMI 37.50 kg/m      GEN: A/Ox3, NAD  CV: regular rate & rhythm without murmurs, rubs or gallops  Lungs: clear to auscultation bilaterally without wheezes, rales or rhonchi. Decreased breath sounds LLL.   Abd: Appropriately tender, + BS, incision clean/dry/intact, uterus firm, below umbilicus  LE: No swelling or pain     Latest Reference Range & Units 22 05:54 22 07:42 22 20:49   Hemoglobin 11.7  - 15.7 g/dL 10.1 (L) 7.1 (L) 7.0 (L)   (L): Data is abnormally low      Lisa Calvin MD  [phone]  05/02/22 8:07 AM

## 2022-05-02 NOTE — PROGRESS NOTES
SW consult not completed.  SW spoke to bedside nurse who states that baby is likely going to be transferred from NICU to [facility].  SW checked on what SW consult is for, and it is for PPD.  Nurse states that pt is in her room alone, and is understandably feeling isolated and emotional.  Pt is Covid positive and cannot have visitors including spouse.    SW called pt room twice, but nobody answered.    SW will call pt again on 5/3/22.

## 2022-05-02 NOTE — PLAN OF CARE
VSS, fundus firm, scant flow, voiding adequately, ambulating independently with walker. Infant remains in NICU, patient is sad but accepting, hopeful infant can come join her this afternoon. Her plan is to breastfeed but she is declining pumping at this point despite education on the importance of early and frequent stimulation. Once baby and her are reunited she will decide what she wants to do for feeding. Pain well controlled with tylenol, ibuprofen and oxycodone as called for. Will continue to monitor.

## 2022-05-03 VITALS
HEART RATE: 95 BPM | BODY MASS INDEX: 37.69 KG/M2 | SYSTOLIC BLOOD PRESSURE: 130 MMHG | OXYGEN SATURATION: 98 % | RESPIRATION RATE: 16 BRPM | WEIGHT: 192 LBS | TEMPERATURE: 98.2 F | DIASTOLIC BLOOD PRESSURE: 79 MMHG | HEIGHT: 60 IN

## 2022-05-03 LAB — PLATELET # BLD AUTO: 333 10E3/UL (ref 150–450)

## 2022-05-03 PROCEDURE — 250N000013 HC RX MED GY IP 250 OP 250 PS 637: Performed by: STUDENT IN AN ORGANIZED HEALTH CARE EDUCATION/TRAINING PROGRAM

## 2022-05-03 PROCEDURE — 36415 COLL VENOUS BLD VENIPUNCTURE: CPT | Performed by: OBSTETRICS & GYNECOLOGY

## 2022-05-03 PROCEDURE — 85049 AUTOMATED PLATELET COUNT: CPT | Performed by: OBSTETRICS & GYNECOLOGY

## 2022-05-03 PROCEDURE — 250N000013 HC RX MED GY IP 250 OP 250 PS 637: Performed by: OBSTETRICS & GYNECOLOGY

## 2022-05-03 RX ORDER — OXYCODONE HYDROCHLORIDE 5 MG/1
5 TABLET ORAL EVERY 4 HOURS PRN
Qty: 18 TABLET | Refills: 0 | Status: SHIPPED | OUTPATIENT
Start: 2022-05-03 | End: 2022-07-22

## 2022-05-03 RX ORDER — CITALOPRAM HYDROBROMIDE 10 MG/1
10 TABLET ORAL DAILY
Status: DISCONTINUED | OUTPATIENT
Start: 2022-05-03 | End: 2022-05-03

## 2022-05-03 RX ORDER — CITALOPRAM HYDROBROMIDE 20 MG/1
40 TABLET ORAL DAILY
Status: DISCONTINUED | OUTPATIENT
Start: 2022-05-04 | End: 2022-05-03

## 2022-05-03 RX ORDER — CITALOPRAM HYDROBROMIDE 20 MG/1
40 TABLET ORAL DAILY
Status: DISCONTINUED | OUTPATIENT
Start: 2022-05-03 | End: 2022-05-03 | Stop reason: HOSPADM

## 2022-05-03 RX ADMIN — ACETAMINOPHEN 975 MG: 325 TABLET, FILM COATED ORAL at 01:30

## 2022-05-03 RX ADMIN — FERROUS SULFATE TAB 325 MG (65 MG ELEMENTAL FE) 325 MG: 325 (65 FE) TAB at 10:16

## 2022-05-03 RX ADMIN — GUAIFENESIN 600 MG: 600 TABLET ORAL at 10:16

## 2022-05-03 RX ADMIN — ACETAMINOPHEN 975 MG: 325 TABLET, FILM COATED ORAL at 07:16

## 2022-05-03 RX ADMIN — IBUPROFEN 800 MG: 400 TABLET ORAL at 07:16

## 2022-05-03 RX ADMIN — SENNOSIDES AND DOCUSATE SODIUM 2 TABLET: 50; 8.6 TABLET ORAL at 07:18

## 2022-05-03 RX ADMIN — CITALOPRAM HYDROBROMIDE 40 MG: 20 TABLET ORAL at 12:13

## 2022-05-03 RX ADMIN — IBUPROFEN 800 MG: 400 TABLET ORAL at 01:30

## 2022-05-03 ASSESSMENT — ACTIVITIES OF DAILY LIVING (ADL)
ADLS_ACUITY_SCORE: 12

## 2022-05-03 NOTE — PROGRESS NOTES
Post Partum Progress Note -  Section    Subjective:   The patient feels well, has no complaints. No dizziness, fatigue with standing or walking  Ambulating:  Yes with assistance of walker (uses cane at home)  Voiding: yes  Passing flatus: yes  Tolerating regular diet: yes  Pain well controlled: yes.    Subjective pain ratin out of 10  Lochia: normal    Infant status: baby at bedside, discharge pending peds eval.  Feeding via breast and bottle. Pt is pumping, but has not had much supply. Difficulty with supply in prior pregnancy, encouraged consultation with lactation specialist to facilitate patient's goal of attempting breast feeding.    Objective:     Vitals:    22 0030 22 0725 22 1607 22 0025   BP: 111/65 124/86 121/79 130/79   BP Location:  Left arm Left arm Left arm   Patient Position:   Semi-Hyde's Semi-Hyde's   Pulse: 89 83 85 95   Resp:  16 16 16   Temp: 97.9  F (36.6  C) 97.5  F (36.4  C) 97.9  F (36.6  C) 98.2  F (36.8  C)   TempSrc: Oral Oral Oral Oral   SpO2: 98%      Weight:       Height:           Physical Exam:  General: No acute distress, alert and oriented X 3.   Heart: RRR, No murmurs, rubs or gallop.   Lungs: Clear breath sounds bilaterally.  Adequate effort.   Abdomen:  Soft, firm fundus at umbilicus. Appropriately tender.     Incision   C/D&I, well approximated without drainage.   Breast No erythema.  No evidence of infection   Extremities no calf tenderness; edema: trace   Perineum N/A   Lochia  None/Light       Assessment & Plan   Rosemary Anastasia Frailing is a 32 year old  s/p  repeat c/s due to history of  delivery at 39w1d on 2022.      1.  Post Operative Day 3  - meeting all postop goals  - reviewed discharge meds, activity restrictions, and wound care instructions.  - baby at bedside.    2. COVID+  - pt with mild cough  - afebrile, O2 sat wnl  - incentive spirometer at bedside, pt instructed on use.    3. Cerebellar ataxia  - uses  cane at home  - using walker postop for additional stability. Discussed option for PT and home use of walker if desired. Pt will consider after seeing how today goes.    4. Anemia, chronic iron deficiency with acute blood loss  -  intraop and 200mL postop  - pt currently asymptomatic, bleeding wnl  - hbg 10.0 > 7.1, redraw hbg at 2pm. Discussed options for IV iron vs blood transfusion if hbg continues to decrease. Will reassess after repeat labs this afternoon.    Activity:  Encouraged pelvic rest x6 weeks.  Continue post partum care    Dispo: Plan for discharge to home today if baby is discharged. If peds planning to watch baby one more day, will keep Pari until POD#4    Lynn Manzano MD  05/03/22 8:14 AM

## 2022-05-03 NOTE — PLAN OF CARE
Vital signs stable. Postpartum assessment WDL. Incision CDI. Pain controlled with tylenol and ibuprofen. Patient ambulating with walker. Pumping. Breastfeeding poor with shield- bottle feeding . Patient and infant bonding well. Plan to discharge today. Will continue with current plan of care.

## 2022-05-03 NOTE — PLAN OF CARE
Vital signs are stable.  Fundus firm, scant flow, voiding adequately, ambulating independently with walker. Infant came back to room around [id number].  She has been breastfeeding infant with nipple shield with full assistance.  She started pumping. Pain well controlled with tylenol, ibuprofen and oxycodone as called for. Will continue to monitor.

## 2022-05-03 NOTE — CONSULTS
[facility]  MATERNAL CHILD HEALTH   INITIAL PSYCHOSOCIAL ASSESSMENT      DATA:      Presenting Information: Rosemary ROLLINS, is a 32 year old year  who was admitted on 22 for . She had a  on 22 and gave birth to a baby girl at 39w.      Living Situation: AYO and FARIDA live in a house together in [place] with their now 2 children.      Social Support: AYO stated she has support from family and friends.       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 FARIDA works as a  and receives 4 weeks of time off.     Insurance: Blue [employer] Blue Shield.     Source of Financial Support: FARIDA is employed. AYO stated they have no financial concerns.      Mental Health History: AYO reports having a history of anxiety and depression. She stated that she manages this with celexa (citalopram) 20mgs and finds this effective.      History of Postpartum Mood Disorders: AYO stated she does not have a history of PPD.      INTERVENTION:        Chart review    Collaboration with team    Conducted Psychosocial Assessment    Introduction to Maternal Child Health SW role and scope of practice    Orientation to the NICU (parking, lodging, meals, visitation)    Validated emotions and provided supportive listening    Provided psychoeducation on  mood disorders and indicated that SW would continue to monitor mood and support bridging to mental health resources as needed.    Provided SW contact info     ASSESSMENT:      Coping: AYO scored an 11 on her PPD. She stated that she scored this because she was diagnosed with covid before delivery. She stated that FARIDA has not been able to be at the hospital, she has not been able to have visitors, and baby was in the NICU. AYO stated that she scored an 11 because of these factors. AYO stated that she is doing better now that baby is back in the room. AYO stated that she also doesn't believe that she was being given her  celexa here at the hospital. Writer called and spoke with RN, who stated she would review chart to determine if AYO was given this or not.      Assessment of parental risk for PMAD: Normal Risk given above.      Risk Factors: History of anxiety and depression.      Resiliency Factors & Strengths: AYO appears to have a good support system and appears to manage her mental health well.      PLAN:      SW will continue to follow for supportive intervention.     MARK Islas  Maternal and Child Health   Phone: [phone]  [email or web address]

## 2022-05-03 NOTE — PLAN OF CARE
D: VSS, assessments WDL.   I: Pt. received complete discharge paperwork and was given times of last dose for all discharge medications in writing on discharge medication sheets.  Discharge teaching included home medication, pain management, activity restrictions, postpartum cares, and signs and symptoms of infection.    A: Discharge outcomes on care plan met.  Mother states understanding and comfort with self and baby cares.  P: Pt. discharged to home.  Pt. was discharged with baby, and bands were checked at time of discharge.  Pt. was accompanied by , nurse and baby, and left with personal belongings.  Pt. to follow up with OB per MD order.  Pt. had no further questions at the time of discharge and no unmet needs were identified.

## 2022-05-26 NOTE — DISCHARGE SUMMARY
[facility] Discharge Summary    Rosemary Davis MRN# [id number]   Age: 32 year old YOB: 1990     Date of Admission:  2022  Date of Discharge::  5/3/2022 12:23 PM   Admitting Physician:  Geoffrey Trujillo MD  Discharge Physician:  Lynn Manzano MD     Home clinic: [facility]          Admission Diagnoses:   Single IUP at 39w0d  History of  delivery  Suspected LGA  Cerebellar ataxia  COVID positive on preop testing  Fetus with UTD A1          Discharge Diagnosis:   Same, s/p repeat  delivery          Procedures:   Procedure(s): Repeat low transverse  section       -           Medications Prior to Admission:     No medications prior to admission.             Discharge Medications:     Discharge Medication List as of 5/3/2022 11:54 AM      START taking these medications    Details   oxyCODONE (ROXICODONE) 5 MG tablet Take 1 tablet (5 mg) by mouth every 4 hours as needed for breakthrough pain, Disp-18 tablet, R-0, Local Print         CONTINUE these medications which have NOT CHANGED    Details   citalopram (CELEXA) 40 MG tablet Take 1 tablet (40 mg) by mouth daily TAKE ONE TAB BY MOUTH DAILY, Disp-90 tablet, R-3, E-Prescribe      Prenatal Vit-Fe Fumarate-FA (PNV PRENATAL PLUS MULTIVITAMIN) 27-1 MG TABS per tablet Take 1 tablet by mouth daily, Historical         STOP taking these medications       acetaminophen (TYLENOL) 325 MG tablet Comments:   Reason for Stopping:                     Consultations:   No consultations were requested during this admission          Brief History of Labor:   33yo  who presented for scheduled repeat  delivery. She did test positive for COVID on preoperative testing, asymptomatic.           Hospital Course:   The patient's hospital course was unremarkable.  She recovered as anticipated and experienced no post-operative complications. On discharge, her pain was well controlled. Vaginal bleeding is similar to peak  menstrual flow.  Voiding without difficulty.  Ambulating well and tolerating a normal diet.  No fever or significant wound drainage.  Breastfeeding well.  Infant is stable.  + bowel movement yet.  She was discharged on post-partum day #3.    Post-partum hemoglobin:   Hemoglobin   Date Value Ref Range Status   05/02/2022 7.4 (L) 11.7 - 15.7 g/dL Final   11/15/2018 7.3 (L) 11.7 - 15.7 g/dL Final             Discharge Instructions and Follow-Up:   Discharge diet: Regular   Discharge activity: No heavy lifting, pushing, pulling for 6 week(s)  No driving or operating machinery while on narcotic analgesics   Discharge follow-up: Follow up with Dr. Lopez in 6 weeks   Wound care: Keep wound clean and dry           Discharge Disposition:   Discharged to home      Attestation:  I have reviewed today's vital signs, notes, medications, labs and imaging.  Amount of time performed on this discharge summary: 5 minutes.    Lynn Manzano MD

## 2022-07-21 NOTE — PROGRESS NOTES
Department of Neurology  Movement Disorders Division   Ataxia Clinic  Follow-up Note    Patient: Rosemary Davis   MRN: [id number]   : 1990   Date of Visit: 2022    Ms. 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 She was last seen on 2020 via telemedicine, at which time plan was made to pursue whole exome testing. Today, she is accompanied by her mother and children.    In the past, she has been evaluated for reversible causes of ataxia (vitamin B12, folate, vitamin E, alpha-fetoprotein, thyroid globulin Ab, thyroid peroxidase Ab, TTG, KARMEN) which were normal. MRI's in the past have not demonstrated cerebellar atrophy.    INTERVAL EVENTS:  Since last visit, she reports that her ataxia has worsened. She is falling more, either trips or loses her balance and can't recover. Falls every 2 weeks approximately. Has had a couple of chipped teeth and black eye from this. Has a cane that she uses outside the house. She has not worked with physical therapy recently.     No difficulty with swallowing, no change in speech. She did have COVID in the end of April and has had an intermittent brain fog feel so not sure if this is post-COVID,  baby, or part of ataxia syndrome.    MEDICATIONS reviewed & pertinent for:   No pertinent medications    ROS:  All others negative except as listed above.    Past Medical History:   Diagnosis Date     Ataxia      Depressive disorder      Humerus fracture initial injury  2012    ORIF left humerus in 2012     Missed          Past Surgical History:   Procedure Laterality Date      SECTION N/A 2018    Procedure:  SECTION;  Surgeon: Geoffrey Trujillo MD;  Location:  L+D      SECTION N/A 2022    Procedure: REPEAT  SECTION;  Surgeon: Geoffrey Trujillo MD;  Location:  L+D      DENTAL SURGERY      wisdom extraction      EYE SURGERY      muscle repair for wandering eye--bilat     OPEN REDUCTION INTERNAL FIXATION ELBOW  5/25/2012    Procedure...:OPEN REDUCTION INTERNAL FIXATION ELBOW; REPAIR OF NON-UNION, LEFT ELBOW ; Surgeon:BREE NAVARRETE; Location: OR     OPEN REDUCTION INTERNAL FIXATION HUMERUS DISTAL  1/28/2012    Procedure:OPEN REDUCTION INTERNAL FIXATION HUMERUS DISTAL; ORIF LEFT DISTAL HUMERUS FRACTURE. SYNTHES ELBOW PLATES, MINI C-ARM; Surgeon:BREE NAVARRETE; Location: OR        Current Outpatient Medications   Medication Sig Dispense Refill     citalopram (CELEXA) 40 MG tablet Take 1 tablet (40 mg) by mouth daily TAKE ONE TAB BY MOUTH DAILY 90 tablet 3       Allergies   Allergen Reactions     Sulfa Drugs Nausea and Vomiting     Cold/shivering/conjunctival injection     Amoxicillin Hives        Family History   Adopted: Yes   Problem Relation Age of Onset     Glaucoma No family hx of      Macular Degeneration No family hx of         Social History     Socioeconomic History     Marital status:      Spouse name: Not on file     Number of children: Not on file     Years of education: Not on file     Highest education level: Not on file   Occupational History     Not on file   Tobacco Use     Smoking status: Never Smoker     Smokeless tobacco: Never Used   Substance and Sexual Activity     Alcohol use: Not Currently     Drug use: No     Sexual activity: Yes   Other Topics Concern     Parent/sibling w/ CABG, MI or angioplasty before 65F 55M? Not Asked   Social History Narrative     Not on file     Social Determinants of Health     Financial Resource Strain: Not on file   Food Insecurity: Not on file   Transportation Needs: Not on file   Physical Activity: Not on file   Stress: Not on file   Social Connections: Not on file   Intimate Partner Violence: Not on file   Housing Stability: Not on file        PHYSICAL EXAM:  /84 (BP Location: Right arm, Patient Position:  Sitting, Cuff Size: Adult Regular)   Pulse 95   Wt 80.1 kg (176 lb 8 oz)   LMP 07/31/2021   SpO2 96%   BMI 34.47 kg/m       Gen: alert, active, attentive, appropriately groomed     NEURO:  MS: Alert and oriented to person, place, time, and situation.  Speech normal to comprehension.  Recent and remote memory intact.  Attention and concentration normal.  Fund of knowledge normal.    CN:  Pupils equal, round, and reactive to light. VFF. EOM normal range, direction changing nystagmus.  Hypometric saccades. Facial sensation intact. Face symmetric at rest and with activation. Hearing grossly intact to conversation. Palate rise b/l, uvula midline.  Mildly ataxic speech. Trapezius and SCM 5/5 bilaterally. Tongue protrudes midline. No fasciculation or atrophy noted.    Motor:  Normal tone throughout upper and lower extremities. Strength is 5/5 throughout and symmetric.     Reflexes: 2+ throughout and symmetric.    Sensation:  Intact to LT in all extremities.    Coordination:  FTN and HTN with dysmetria bilaterally.    Gait:  Wide based, ataxic gait. Able to stand with feet together for 6 seconds. Romberg negative.     ASSESSMENT:  Rosemary Davis is a 30 year old female with with use of tropia, nystagmus status post eye surgery, global developmental delay, schizencephaly and now with a history of ataxia.  Reversible causes of ataxia were excluded with normal vitamin B12, folate, vitamin E, alpha fetoprotein, thyroid globulin Ab, thyroid peroxidase Ab, TTG , KARMEN. She has not undergone genetic testing and her family history is unknown due to adoption.    PLAN:  - physical therapy referral, discuss if they recommend walker or walking sticks     RTC as needed     Patient and plan was examined & discussed with attending physician, Dr. Wood.     Estrellita Keith MD  Movement Disorders Fellow

## 2022-07-22 ENCOUNTER — OFFICE VISIT (OUTPATIENT)
Dept: NEUROLOGY | Facility: CLINIC | Age: 32
End: 2022-07-22
Payer: COMMERCIAL

## 2022-07-22 VITALS
HEART RATE: 95 BPM | DIASTOLIC BLOOD PRESSURE: 84 MMHG | SYSTOLIC BLOOD PRESSURE: 124 MMHG | WEIGHT: 176.5 LBS | BODY MASS INDEX: 34.47 KG/M2 | OXYGEN SATURATION: 96 %

## 2022-07-22 DIAGNOSIS — R27.0 ATAXIA: Primary | ICD-10-CM

## 2022-07-22 PROCEDURE — 99214 OFFICE O/P EST MOD 30 MIN: CPT | Mod: GC | Performed by: PSYCHIATRY & NEUROLOGY

## 2022-07-22 ASSESSMENT — PAIN SCALES - GENERAL: PAINLEVEL: NO PAIN (0)

## 2022-07-22 NOTE — PATIENT INSTRUCTIONS
I recommend that you talk with physical therapy about the cane as likely this is not the best option to help your balance. You should also work on exercises with them to strengthen your legs and decrease your fall risk.    Call us when you're ready for another follow-up.

## 2022-07-22 NOTE — LETTER
2022       RE: Rosemary Davis  [address] [facility] [address]     Dear Colleague,    Thank you for referring your patient, Rosemary Davis, to the [facility] NEUROLOGY [facility] [place] at [facility]. Please see a copy of my visit note below.    Department of Neurology  Movement Disorders Division   Ataxia Clinic  Follow-up Note    Patient: Rosemary Davis   MRN: [id number]   : 1990   Date of Visit: 2022    Ms. 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 She was last seen on 2020 via telemedicine, at which time plan was made to pursue whole exome testing. Today, she is accompanied by her mother and children.    In the past, she has been evaluated for reversible causes of ataxia (vitamin B12, folate, vitamin E, alpha-fetoprotein, thyroid globulin Ab, thyroid peroxidase Ab, TTG, KARMEN) which were normal. MRI's in the past have not demonstrated cerebellar atrophy.    INTERVAL EVENTS:  Since last visit, she reports that her ataxia has worsened. She is falling more, either trips or loses her balance and can't recover. Falls every 2 weeks approximately. Has had a couple of chipped teeth and black eye from this. Has a cane that she uses outside the house. She has not worked with physical therapy recently.     No difficulty with swallowing, no change in speech. She did have COVID in the end of April and has had an intermittent brain fog feel so not sure if this is post-COVID,  baby, or part of ataxia syndrome.    MEDICATIONS reviewed & pertinent for:   No pertinent medications    ROS:  All others negative except as listed above.    Past Medical History:   Diagnosis Date     Ataxia      Depressive disorder      Humerus fracture initial injury  2012    ORIF left humerus in  2012     Missed          Past Surgical History:   Procedure Laterality Date      SECTION N/A 2018    Procedure:  SECTION;  Surgeon: Geoffrey Trujillo MD;  Location:  L+D      SECTION N/A 2022    Procedure: REPEAT  SECTION;  Surgeon: Geoffrey Trujillo MD;  Location:  L+D     DENTAL SURGERY      wisdom extraction      EYE SURGERY      muscle repair for wandering eye--bilat     OPEN REDUCTION INTERNAL FIXATION ELBOW  2012    Procedure...:OPEN REDUCTION INTERNAL FIXATION ELBOW; REPAIR OF NON-UNION, LEFT ELBOW ; Surgeon:BREE NAVARRETE; Location: OR     OPEN REDUCTION INTERNAL FIXATION HUMERUS DISTAL  2012    Procedure:OPEN REDUCTION INTERNAL FIXATION HUMERUS DISTAL; ORIF LEFT DISTAL HUMERUS FRACTURE. SYNTHES ELBOW PLATES, MINI C-ARM; Surgeon:BREE NAVARRETE; Location: OR        Current Outpatient Medications   Medication Sig Dispense Refill     citalopram (CELEXA) 40 MG tablet Take 1 tablet (40 mg) by mouth daily TAKE ONE TAB BY MOUTH DAILY 90 tablet 3       Allergies   Allergen Reactions     Sulfa Drugs Nausea and Vomiting     Cold/shivering/conjunctival injection     Amoxicillin Hives        Family History   Adopted: Yes   Problem Relation Age of Onset     Glaucoma No family hx of      Macular Degeneration No family hx of         Social History     Socioeconomic History     Marital status:      Spouse name: Not on file     Number of children: Not on file     Years of education: Not on file     Highest education level: Not on file   Occupational History     Not on file   Tobacco Use     Smoking status: Never Smoker     Smokeless tobacco: Never Used   Substance and Sexual Activity     Alcohol use: Not Currently     Drug use: No     Sexual activity: Yes   Other Topics Concern     Parent/sibling w/ CABG, MI or angioplasty before 65F 55M? Not Asked   Social History Narrative     Not on file     Social Determinants of Health     Financial  Resource Strain: Not on file   Food Insecurity: Not on file   Transportation Needs: Not on file   Physical Activity: Not on file   Stress: Not on file   Social Connections: Not on file   Intimate Partner Violence: Not on file   Housing Stability: Not on file        PHYSICAL EXAM:  /84 (BP Location: Right arm, Patient Position: Sitting, Cuff Size: Adult Regular)   Pulse 95   Wt 80.1 kg (176 lb 8 oz)   LMP 07/31/2021   SpO2 96%   BMI 34.47 kg/m       Gen: alert, active, attentive, appropriately groomed     NEURO:  MS: Alert and oriented to person, place, time, and situation.  Speech normal to comprehension.  Recent and remote memory intact.  Attention and concentration normal.  Fund of knowledge normal.    CN:  Pupils equal, round, and reactive to light. VFF. EOM normal range, direction changing nystagmus.  Hypometric saccades. Facial sensation intact. Face symmetric at rest and with activation. Hearing grossly intact to conversation. Palate rise b/l, uvula midline.  Mildly ataxic speech. Trapezius and SCM 5/5 bilaterally. Tongue protrudes midline. No fasciculation or atrophy noted.    Motor:  Normal tone throughout upper and lower extremities. Strength is 5/5 throughout and symmetric.     Reflexes: 2+ throughout and symmetric.    Sensation:  Intact to LT in all extremities.    Coordination:  FTN and HTN with dysmetria bilaterally.    Gait:  Wide based, ataxic gait. Able to stand with feet together for 6 seconds. Romberg negative.     ASSESSMENT:  Rosemary Davis is a 30 year old female with with use of tropia, nystagmus status post eye surgery, global developmental delay, schizencephaly and now with a history of ataxia.  Reversible causes of ataxia were excluded with normal vitamin B12, folate, vitamin E, alpha fetoprotein, thyroid globulin Ab, thyroid peroxidase Ab, TTG , KARMEN. She has not undergone genetic testing and her family history is unknown due to adoption.    PLAN:  - physical therapy  referral, discuss if they recommend walker or walking sticks     RTC as needed     Patient and plan was examined & discussed with attending physician, Dr. Wood.     Estrellita Keith MD  Movement Disorders Fellow       Attestation signed by Suha Wood MD at 7/24/2022  4:10 PM:  I have personally interviewed and examined Ms. Rosemary Oconnor Frailing and agree with diagnosis and management. The total time spent with the patient was 30 minutes, over 50% of the time spent in counseling and coordinating care.      Sincerely,    Suha Wood MD

## 2022-07-25 ENCOUNTER — OFFICE VISIT (OUTPATIENT)
Dept: FAMILY MEDICINE | Facility: CLINIC | Age: 32
End: 2022-07-25

## 2022-07-25 VITALS
OXYGEN SATURATION: 99 % | TEMPERATURE: 99.4 F | HEART RATE: 93 BPM | BODY MASS INDEX: 34.33 KG/M2 | SYSTOLIC BLOOD PRESSURE: 116 MMHG | DIASTOLIC BLOOD PRESSURE: 78 MMHG | WEIGHT: 175.8 LBS

## 2022-07-25 DIAGNOSIS — F41.1 GENERALIZED ANXIETY DISORDER: ICD-10-CM

## 2022-07-25 DIAGNOSIS — F33.42 RECURRENT MAJOR DEPRESSIVE DISORDER, IN FULL REMISSION (H): ICD-10-CM

## 2022-07-25 DIAGNOSIS — J02.9 SORE THROAT: Primary | ICD-10-CM

## 2022-07-25 DIAGNOSIS — R05.9 COUGH: ICD-10-CM

## 2022-07-25 LAB — S PYO AG THROAT QL IA.RAPID: NEGATIVE

## 2022-07-25 PROCEDURE — 99214 OFFICE O/P EST MOD 30 MIN: CPT | Performed by: NURSE PRACTITIONER

## 2022-07-25 PROCEDURE — 87430 STREP A AG IA: CPT | Performed by: NURSE PRACTITIONER

## 2022-07-25 RX ORDER — CITALOPRAM HYDROBROMIDE 40 MG/1
40 TABLET ORAL DAILY
Qty: 90 TABLET | Refills: 3 | Status: SHIPPED | OUTPATIENT
Start: 2022-07-25 | End: 2023-08-02

## 2022-07-25 RX ORDER — BENZONATATE 100 MG/1
100 CAPSULE ORAL 3 TIMES DAILY PRN
Qty: 20 CAPSULE | Refills: 1 | Status: SHIPPED | OUTPATIENT
Start: 2022-07-25 | End: 2023-07-27

## 2022-07-25 ASSESSMENT — ANXIETY QUESTIONNAIRES
1. FEELING NERVOUS, ANXIOUS, OR ON EDGE: NOT AT ALL
5. BEING SO RESTLESS THAT IT IS HARD TO SIT STILL: NOT AT ALL
GAD7 TOTAL SCORE: 0
2. NOT BEING ABLE TO STOP OR CONTROL WORRYING: NOT AT ALL
GAD7 TOTAL SCORE: 0
7. FEELING AFRAID AS IF SOMETHING AWFUL MIGHT HAPPEN: NOT AT ALL
6. BECOMING EASILY ANNOYED OR IRRITABLE: NOT AT ALL
3. WORRYING TOO MUCH ABOUT DIFFERENT THINGS: NOT AT ALL
IF YOU CHECKED OFF ANY PROBLEMS ON THIS QUESTIONNAIRE, HOW DIFFICULT HAVE THESE PROBLEMS MADE IT FOR YOU TO DO YOUR WORK, TAKE CARE OF THINGS AT HOME, OR GET ALONG WITH OTHER PEOPLE: NOT DIFFICULT AT ALL

## 2022-07-25 ASSESSMENT — PATIENT HEALTH QUESTIONNAIRE - PHQ9
SUM OF ALL RESPONSES TO PHQ QUESTIONS 1-9: 3
5. POOR APPETITE OR OVEREATING: NOT AT ALL

## 2022-07-25 NOTE — PATIENT INSTRUCTIONS
Likely a post-viral cough- will try the benzonatate three times daily as needed for cough, can continue the Delsym  Honey is a natural cough suppressant- the dose is 1 tsp every four hours, can mix in warm fluids/tea    Getting the rapid strep, I will let you know the results    Continuing citalopram    IF THINGS ARE NOT GETTING BETTER or you are getting worse- such as fever, chest discomfort, more prevalent cough, overall just not feeling well, I need to know and we need to re-evaluate

## 2022-07-25 NOTE — PROGRESS NOTES
"Problem(s) Oriented visit        SUBJECTIVE:                                                    Rosemary Davis is a 32 year old female who presents today for the following:    About 8 days ago felt ill with the chills, then started feeling better a couple days later, then felt \"gross\" again. Had a cough, sore throat, fatigue. Now having a dry cough- every now and then some clear mucus. Occasionally a little yellow green. No wheezing, chest pain, trouble breathing with exertion or at rest. Cough is waking her up at night. Taking Delsym which helps a little. Mainly at night/early in the morning, now it feels throughout the day. Still a little sore throat at times. No significant rhinorrhea, sinus congestion, headaches, or dental pain.    Mental health is stable back on citalopram- denies depressive symptoms or significant anxiety. No thoughts of hurting herself or other people. Doing well after birth of her baby Leonarda, 2 months old- states Leonarda is a good sleeper, her  is supportive, and her son Jann loves Leonarda.     She did have Covid at the time of Leonarda's birth and is fully vaccinated.          ROS:  Gen, HEENT, CV, resp, GI, neuro, endo, psych negative except as listed per HPI      OBJECTIVE:                                                    Physical Exam:    /78   Pulse 93   Temp 99.4  F (37.4  C) (Oral)   Wt 79.7 kg (175 lb 12.8 oz)   SpO2 99%   BMI 34.33 kg/m      CONSTITUTIONAL: Alert non-toxic appearing female in no acute distress  HEENT: Normocephalic, atraumatic. PERRLA, no scleral icterus or injection, EOMI intact. EACs clear bilaterally, TMs pearly gray and intact with good visualization of bony landmarks and cone of light, no bulging or erythema; nares patent without ulceration or edema; oropharynx pink without lesions- palatal petechiae noted; tonsils slightly enlarged 2+ with small scattered white exudates  RESPIRATORY: Lungs clear to auscultation, respirations unlabored  CV: " Regular rate and rhythm, S1S2, no clicks, murmurs, rubs, or gallops  GASTROINTESTINAL: Abdomen soft, non-distended, and non-tender to palpation  NEUROLOGIC: No gross deficits  PSYCHIATRIC: Pleasant and interactive, affect euthymic, makes appropriate eye contact, thought process logical       ASSESSMENT/PLAN:                                                          Rosemary was seen today for cough and recheck medication.    Diagnoses and all orders for this visit:    Cough  -     benzonatate (TESSALON) 100 MG capsule; Take 1 capsule (100 mg) by mouth 3 times daily as needed for cough  Sore throat  -     Rapid Strep (RMG)    Suspect viral URI with cough and sore throat, rapid strep negative. Discussed that it is possible she had a new variant of COVID, though unable to determine this without COVID testing. At this time, given symptoms 8 days out, limited benefit of COVID testing- continue to wear a mask in public, take recommended precautions. Cough is likely viral in nature- respirations are unlabored, airways are clear, and sats are 99% on RA. Trial of Tessalon TID PRN, continue Delsym, reviewed symptomatic cares. Reviewed side effects of medications, alarm signs and symptoms, and when to seek further care.    Recurrent major depressive disorder, in full remission (H)  Generalized anxiety disorder  -     citalopram (CELEXA) 40 MG tablet; Take 1 tablet (40 mg) by mouth daily TAKE ONE TAB BY MOUTH DAILY    Well controlled, continue current regimen. Recheck next year, earlier with any concerns.        Patient Instructions   Likely a post-viral cough- will try the benzonatate three times daily as needed for cough, can continue the Delsym  Honey is a natural cough suppressant- the dose is 1 tsp every four hours, can mix in warm fluids/tea    Getting the rapid strep, I will let you know the results    Continuing citalopram    IF THINGS ARE NOT GETTING BETTER or you are getting worse- such as fever, chest discomfort, more  prevalent cough, overall just not feeling well, I need to know and we need to re-evaluate          AMELIE Whitfield CNP  [facility]  Family Practice  [facility]  [phone]    For any issues my office # is [phone]

## 2022-07-28 LAB — BETA STREP GP A CULTURE: NEGATIVE

## 2022-07-29 ENCOUNTER — MYC MEDICAL ADVICE (OUTPATIENT)
Dept: FAMILY MEDICINE | Facility: CLINIC | Age: 32
End: 2022-07-29

## 2022-07-29 DIAGNOSIS — R05.9 COUGH: Primary | ICD-10-CM

## 2022-07-29 RX ORDER — GUAIFENESIN/DEXTROMETHORPHAN 100-10MG/5
5-10 SYRUP ORAL EVERY 4 HOURS PRN
Qty: 236 ML | Refills: 0 | COMMUNITY
Start: 2022-07-29 | End: 2023-07-27

## 2022-07-29 NOTE — TELEPHONE ENCOUNTER
Per Kenya KINSEY CNP prescription called into pharmacy for Robitussin-AC for cough. Patient will call clinic next week with update. Kellie Bourgeois

## 2022-08-01 ENCOUNTER — HOSPITAL ENCOUNTER (OUTPATIENT)
Dept: PHYSICAL THERAPY | Facility: CLINIC | Age: 32
Discharge: HOME | End: 2022-08-01
Attending: PSYCHIATRY & NEUROLOGY
Payer: COMMERCIAL

## 2022-08-01 DIAGNOSIS — R27.0 ATAXIA: ICD-10-CM

## 2022-08-01 DIAGNOSIS — R26.89 BALANCE PROBLEMS: Primary | ICD-10-CM

## 2022-08-01 PROCEDURE — 97112 NEUROMUSCULAR REEDUCATION: CPT | Mod: GP

## 2022-08-01 PROCEDURE — 97162 PT EVAL MOD COMPLEX 30 MIN: CPT | Mod: GP

## 2022-08-01 NOTE — PROGRESS NOTES
"   08/01/22 0800   Quick Adds   Quick Adds Certification   General Information   Start of Care Date 08/01/22   Referring Physician Estrellita Keith MD   Orders Evaluate and Treat as Indicated   Order Date 07/22/22   Medical Diagnosis Ataxia   Onset of illness/injury or Date of Surgery 07/22/22  (order date)   Precautions/Limitations fall precautions   Surgical/Medical history reviewed Yes   Pertinent history of current problem (include personal factors and/or comorbidities that impact the POC) Pari arrives to session using tripod cane. 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 Reports she is \"more wobbly\" when walking, she is falling more, and is starting to get L sided lateral hip pain. Chipped a tooth when she fell, scratches all over. She recently went to the ataxia center and her doctor recommended looking into other AD options so she is interested in exploring a walker and walking poles. Pari has 2 kids ages 3 months and 3 yo and reports she often trips over their toys laying out. States she tried using a walker the other night when her family went for a walk around [facility] and felt her feet kept getting tripped up on the wheels.   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 Uses it when she feels she needs it more. Does not use cane around house   Patient/Family Goals Statement How to use AD better, improve balance   Fall Risk Screen   Fall screen completed by PT   Have you fallen 2 or more times in the past year? Yes   Have you fallen and had an injury in the past year? Yes   Is " patient a fall risk? Yes   Fall screen comments Unbalanced while walking down hill, uneven ground is difficult, fell in backyard when trying to get dog   Abuse Screen (yes response referral indicated)   Physical Signs of Abuse Present no   Pain   Patient currently in pain Yes   Pain comments L hip pain, lower back- been present since giving birth   Integumentary   Integumentary Comments Several scratches and bruises on LEs from falling.   Range of Motion (ROM)   ROM Comment ROM WNL   Strength   Strength Comments L side overall a bit weaker than left, global LE screen 5/5 on R, 4/5 on L   Gait   Gait Gait Analysis;Stairs   Gait Analysis   Gait Deviations Noted increased stride width;decreased step length;decreased toe-to-floor clearance;increased time in double stance   Impairments Contributing to Gait Deviations impaired balance;impaired coordination;impaired motor control;decreased strength  (ataxia)   Stairs   Self Performance Modified independent   Rails 1 rail  (Able to descend using reciprocal pattern when using 2 railings, when using 1 railing uses step to pattern leading with L LE)   Indicate number of stairs 4   Gait Special Tests   Gait Special Tests DYNAMIC GAIT INDEX   Gait Special Tests Dynamic Gait Index   Score out of 24 8/24   Comments Score 19 or below indicates fall risk   Balance   Balance Comments Quite unsteady on feet when ambulating. Poor coordination using cane- is more of a hindrance than a help. Walks very quickly with WBOS and evident ataxic movements and gait pattern. Decreased quad control causing fast knee extension into terminal swing and spending less time in knee flexion.   Balance Special Tests   Balance Special Tests Timed up and go;Sit to stand reps   Balance Special Tests Timed Up and Go   Seconds 13.47 Seconds   Comments with cane, unstable and ataxic   Balance Special Tests Sit to Stand Reps in 30 Seconds   Reps in 30 seconds 10   Height standard chair   Comments tendency to  lose balance backwards, pushing knees back into chair   Coordination   Coordination Comments Poor coordination with gait/ambulation and use of AD   Planned Therapy Interventions   Planned Therapy Interventions balance training;gait training;motor coordination training;neuromuscular re-education;strengthening   Clinical Impression   Criteria for Skilled Therapeutic Interventions Met yes, treatment indicated   PT Diagnosis Impaired gait and ambulation, muscle weakness, impaired balance   Influenced by the following impairments Ataxia, muscle weakness, impaired balance, poor coordination   Functional limitations due to impairments Increased risk for falls, decreased safety, decreased ability to participate in family outings d/t lack of balance   Clinical Presentation Evolving/Changing   Clinical Presentation Rationale Symptom report, clinical judgment   Clinical Decision Making (Complexity) Moderate complexity   Therapy Frequency 1 time/week   Predicted Duration of Therapy Intervention (days/wks) 90 days   Risk & Benefits of therapy have been explained Yes   Patient, Family & other staff in agreement with plan of care Yes   Clinical Impression Comments Pari is a sweet 32 year old female who presents to PT for evaluation of ataxia. She demonstrates poor balance and coordination with gait and ambulation, placing her at an increased risk for falls. Pt also presents with decreased LE strength with poor motor control and stabilization. Pari will benefit from skilled PT services targeting these deficits in order to increase safety, decrease risk for falls, and improve participation in home and community activities.   GOALS   PT Eval Goals 1;2;3   Goal 1   Goal Identifier DGI   Goal Description Pari will improve score on DGI to 20/24 or higher in order to decrease risk for falls.   Target Date 10/29/22   Goal 2   Goal Identifier 30 second STS   Goal Description Pari will perform 13 sit to stands in 30 seconds with no overt  instabilities or LOB in order to improve LE strength and endurance needed for safe gait and ambulation.   Target Date 10/29/22   Goal 3   Goal Identifier TUG   Goal Description Pari will perform the TUG in less than 13 seconds with no apparent instabilities or LOB in order to improve dynamic balance and decrease fall risk.   Target Date 10/29/22   Total Evaluation Time   PT Eval, Moderate Complexity Minutes (56330) 25   Therapy Certification   Certification date from 08/01/22   Certification date to 10/29/22   Medical Diagnosis Ataxia   Certification I certify the need for these services furnished under this plan of treatment and while under my care.  (Physician co-signature of this document indicates review and certification of the therapy plan).       Thank you for referring Pari to outpatient physical therapy. Please do not hesitate to contact me with any questions via email at [email or web address].     Brijesh Morales, PT, DPT  [employer]   [email or web address]

## 2022-08-01 NOTE — PROGRESS NOTES
[facility]                                                                                   OUTPATIENT PHYSICAL THERAPY FUNCTIONAL EVALUATION  PLAN OF TREATMENT FOR OUTPATIENT REHABILITATION  (COMPLETE FOR INITIAL CLAIMS ONLY)  Patient's Last Name, First Name, M.I.  YOB: 1990  Rosemary Davis     Provider's Name   [facility]   Medical Record No.  [id number]     Start of Care Date:  08/01/22   Onset Date:  07/22/22 (order date)   Type:     _X__PT   ____OT  ____SLP Medical Diagnosis:  Ataxia     PT Diagnosis:  Impaired gait and ambulation, muscle weakness, impaired balance Visits from SOC:  1                              __________________________________________________________________________________  Plan of Treatment/Functional Goals:  balance training, gait training, motor coordination training, neuromuscular re-education, strengthening           GOALS  DGI  Pari will improve score on DGI to 20/24 or higher in order to decrease risk for falls.  10/29/22    30 second STS  Pari will perform 13 sit to stands in 30 seconds with no overt instabilities or LOB in order to improve LE strength and endurance needed for safe gait and ambulation.  10/29/22    TUG  Pari will perform the TUG in less than 13 seconds with no apparent instabilities or LOB in order to improve dynamic balance and decrease fall risk.  10/29/22        Therapy Frequency:  1 time/week   Predicted Duration of Therapy Intervention:  90 days    [facility] GUY PT                                    I CERTIFY THE NEED FOR THESE SERVICES FURNISHED UNDER        THIS PLAN OF TREATMENT AND WHILE UNDER MY CARE     (Physician co-signature of this document indicates review and certification of the therapy plan).                Certification Date From:  08/01/22   Certification Date To:  10/29/22    Referring  Patient called asking that this request be moved as quickly as possible. She will be out of medication tomorrow. Patient was assured a nurse was working on the case as we spoke.   Provider:  Estrellita Keith MD    Initial Assessment  See Epic Evaluation- Start of Care Date: 08/01/22

## 2022-08-09 ENCOUNTER — HOSPITAL ENCOUNTER (OUTPATIENT)
Dept: PHYSICAL THERAPY | Facility: CLINIC | Age: 32
Discharge: HOME | End: 2022-08-09
Payer: COMMERCIAL

## 2022-08-09 DIAGNOSIS — M62.81 MUSCLE WEAKNESS (GENERALIZED): ICD-10-CM

## 2022-08-09 DIAGNOSIS — R26.89 BALANCE PROBLEMS: ICD-10-CM

## 2022-08-09 DIAGNOSIS — R27.0 ATAXIA: Primary | ICD-10-CM

## 2022-08-09 PROCEDURE — 97116 GAIT TRAINING THERAPY: CPT | Mod: GP

## 2022-08-09 PROCEDURE — 97110 THERAPEUTIC EXERCISES: CPT | Mod: GP

## 2022-08-22 ENCOUNTER — HOSPITAL ENCOUNTER (OUTPATIENT)
Dept: PHYSICAL THERAPY | Facility: CLINIC | Age: 32
Discharge: HOME | End: 2022-08-22
Payer: COMMERCIAL

## 2022-08-22 DIAGNOSIS — R27.0 ATAXIA: Primary | ICD-10-CM

## 2022-08-22 DIAGNOSIS — R26.89 BALANCE PROBLEMS: ICD-10-CM

## 2022-08-22 PROCEDURE — 97112 NEUROMUSCULAR REEDUCATION: CPT | Mod: GP

## 2022-08-22 PROCEDURE — 97110 THERAPEUTIC EXERCISES: CPT | Mod: GP

## 2022-09-02 ENCOUNTER — HOSPITAL ENCOUNTER (OUTPATIENT)
Dept: PHYSICAL THERAPY | Facility: CLINIC | Age: 32
Discharge: HOME | End: 2022-09-02
Payer: COMMERCIAL

## 2022-09-02 DIAGNOSIS — M62.81 MUSCLE WEAKNESS (GENERALIZED): ICD-10-CM

## 2022-09-02 DIAGNOSIS — R27.0 ATAXIA: Primary | ICD-10-CM

## 2022-09-02 DIAGNOSIS — R26.89 BALANCE PROBLEMS: ICD-10-CM

## 2022-09-02 PROCEDURE — 97112 NEUROMUSCULAR REEDUCATION: CPT | Mod: GP | Performed by: PHYSICAL THERAPIST

## 2022-09-02 PROCEDURE — 97116 GAIT TRAINING THERAPY: CPT | Mod: GP | Performed by: PHYSICAL THERAPIST

## 2022-09-12 ENCOUNTER — HOSPITAL ENCOUNTER (OUTPATIENT)
Dept: PHYSICAL THERAPY | Facility: CLINIC | Age: 32
Discharge: HOME | End: 2022-09-12
Payer: COMMERCIAL

## 2022-09-12 DIAGNOSIS — M62.81 MUSCLE WEAKNESS (GENERALIZED): ICD-10-CM

## 2022-09-12 DIAGNOSIS — R26.89 BALANCE PROBLEMS: ICD-10-CM

## 2022-09-12 DIAGNOSIS — R27.0 ATAXIA: Primary | ICD-10-CM

## 2022-09-12 PROCEDURE — 97110 THERAPEUTIC EXERCISES: CPT | Mod: GP | Performed by: PHYSICAL THERAPIST

## 2022-09-12 PROCEDURE — 97116 GAIT TRAINING THERAPY: CPT | Mod: GP | Performed by: PHYSICAL THERAPIST

## 2022-09-19 ENCOUNTER — HOSPITAL ENCOUNTER (OUTPATIENT)
Dept: PHYSICAL THERAPY | Facility: CLINIC | Age: 32
Discharge: HOME | End: 2022-09-19
Payer: COMMERCIAL

## 2022-09-19 DIAGNOSIS — M62.81 MUSCLE WEAKNESS (GENERALIZED): ICD-10-CM

## 2022-09-19 DIAGNOSIS — R26.89 BALANCE PROBLEMS: ICD-10-CM

## 2022-09-19 DIAGNOSIS — R27.0 ATAXIA: Primary | ICD-10-CM

## 2022-09-19 PROCEDURE — 97112 NEUROMUSCULAR REEDUCATION: CPT | Mod: GP | Performed by: PHYSICAL THERAPIST

## 2022-09-19 PROCEDURE — 97110 THERAPEUTIC EXERCISES: CPT | Mod: GP | Performed by: PHYSICAL THERAPIST

## 2022-09-26 ENCOUNTER — HOSPITAL ENCOUNTER (OUTPATIENT)
Dept: PHYSICAL THERAPY | Facility: CLINIC | Age: 32
Discharge: HOME | End: 2022-09-26
Payer: COMMERCIAL

## 2022-09-26 DIAGNOSIS — M62.81 MUSCLE WEAKNESS (GENERALIZED): ICD-10-CM

## 2022-09-26 DIAGNOSIS — R27.0 ATAXIA: Primary | ICD-10-CM

## 2022-09-26 DIAGNOSIS — R26.89 BALANCE PROBLEMS: ICD-10-CM

## 2022-09-26 PROCEDURE — 97110 THERAPEUTIC EXERCISES: CPT | Mod: GP | Performed by: PHYSICAL THERAPIST

## 2022-10-03 ENCOUNTER — HOSPITAL ENCOUNTER (OUTPATIENT)
Dept: PHYSICAL THERAPY | Facility: CLINIC | Age: 32
Discharge: HOME | End: 2022-10-03
Payer: COMMERCIAL

## 2022-10-03 DIAGNOSIS — R26.89 BALANCE PROBLEMS: ICD-10-CM

## 2022-10-03 DIAGNOSIS — R27.0 ATAXIA: Primary | ICD-10-CM

## 2022-10-03 DIAGNOSIS — M62.81 MUSCLE WEAKNESS (GENERALIZED): ICD-10-CM

## 2022-10-03 PROCEDURE — 97112 NEUROMUSCULAR REEDUCATION: CPT | Mod: GP | Performed by: PHYSICAL THERAPIST

## 2022-10-03 PROCEDURE — 97110 THERAPEUTIC EXERCISES: CPT | Mod: GP | Performed by: PHYSICAL THERAPIST

## 2022-10-10 ENCOUNTER — HOSPITAL ENCOUNTER (OUTPATIENT)
Dept: PHYSICAL THERAPY | Facility: CLINIC | Age: 32
Discharge: HOME | End: 2022-10-10
Payer: COMMERCIAL

## 2022-10-10 DIAGNOSIS — R27.0 ATAXIA: Primary | ICD-10-CM

## 2022-10-10 DIAGNOSIS — R26.89 BALANCE PROBLEMS: ICD-10-CM

## 2022-10-10 PROCEDURE — 97110 THERAPEUTIC EXERCISES: CPT | Mod: GP | Performed by: PHYSICAL THERAPIST

## 2022-10-10 PROCEDURE — 97112 NEUROMUSCULAR REEDUCATION: CPT | Mod: GP | Performed by: PHYSICAL THERAPIST

## 2022-10-19 ENCOUNTER — TELEPHONE (OUTPATIENT)
Dept: OPTOMETRY | Facility: CLINIC | Age: 32
End: 2022-10-19

## 2022-10-19 ENCOUNTER — OFFICE VISIT (OUTPATIENT)
Dept: OPTOMETRY | Facility: CLINIC | Age: 32
End: 2022-10-19
Payer: MEDICARE

## 2022-10-19 DIAGNOSIS — H52.13 MYOPIA OF BOTH EYES: Primary | ICD-10-CM

## 2022-10-19 ASSESSMENT — REFRACTION_CURRENTRX
OS_BRAND: AIR OPTIX HYDRAGLYDE
OS_DIAMETER: 14.2
OS_SPHERE: -4.25
OD_BRAND: AIR OPTIX HYDRAGLYDE
OD_DIAMETER: 14.2
OD_SPHERE: -4.25
OS_BASECURVE: 8.6
OD_BASECURVE: 8.6

## 2022-10-19 NOTE — PROGRESS NOTES
No office visit. CL order only    Contact Lens Billing  V-Code:  - Soft spherical  Final Contact Lens Rx       Brand Base Curve Diameter Sphere    Right Air Optix Hydraglyde 8.6 14.2 -4.25    Left Air Optix Hydraglyde 8.6 14.2 -4.25         [facility] order mailed direct: [id number]  # of units: 4 boxes  Price per Unit: $55 per box  Free shipping annual supply    These are for cosmetic contact lenses.    Encounter Diagnosis   Name Primary?     Myopia of both eyes Yes        Date of last eye exam: 2/25/22

## 2022-10-31 ENCOUNTER — HOSPITAL ENCOUNTER (OUTPATIENT)
Dept: PHYSICAL THERAPY | Facility: CLINIC | Age: 32
Discharge: HOME | End: 2022-10-31
Payer: COMMERCIAL

## 2022-10-31 DIAGNOSIS — M62.81 MUSCLE WEAKNESS (GENERALIZED): ICD-10-CM

## 2022-10-31 DIAGNOSIS — R26.89 BALANCE PROBLEMS: ICD-10-CM

## 2022-10-31 DIAGNOSIS — R27.0 ATAXIA: Primary | ICD-10-CM

## 2022-10-31 PROCEDURE — 97750 PHYSICAL PERFORMANCE TEST: CPT | Mod: GP | Performed by: PHYSICAL THERAPIST

## 2022-10-31 PROCEDURE — 97110 THERAPEUTIC EXERCISES: CPT | Mod: GP | Performed by: PHYSICAL THERAPIST

## 2022-10-31 NOTE — PLAN OF CARE
Problem: Patient Care Overview  Goal: Plan of Care/Patient Progress Review  Outcome: Improving  Vital signs stable. Voiding without difficulty. Using ibuprofen/tylenol for pain management. Up ambulating free of dizziness. Working on breastfeeding every 2-3 hours. BF fair. Mom pumping and supplementing infant after feedings. Grandma helping mother with infant cares. Encouraged to call with questions/concerns. Will continue to monitor.        23:00

## 2022-11-04 NOTE — PROGRESS NOTES
10/31/22 1000   Signing Clinician's Name / Credentials   Signing clinician's name / credentials Wilfred Montoya, PT, DPT, NCS   Dynamic Gait Index (Brandon and Kendrick Raleigh, 1995)   Gait Level Surface 2   Change in Gait Speed 1   Gait and Horizontal Head Turns 2   Gait with Vertical Head Turns 1   Gait and Pivot Turns 2   Step Over Obstacle 0   Step Around Obstacles 2   Steps 2   Total Dynamic Gait Index Score  (A score of 19 or less has been correlated to an increased risk of falls in community dwelling older adults, patients with vestibular disorders, and patients with MS.)   Total Score (out of 24) 12     Dynamic Gait Index (DGI):The DGI is a measure of balance during gait that is reliable and valid for the elderly and individuals with Parkinson's disease, MS, vestibular disorders, or s/p stroke. Gait assistive device used: None    Scores ?19 /24 indicate an increased risk for falls according to Tatyana et al 2000  Minimal Detectable Change = 2.9 in community dwelling elderly according to Matheus et al 2011    Assessment (rationale for performing, application to patient s function & care plan): Pt improved from 8/24 to 12/24. She continues to score a high fall risk, however demonstrates improved stability with B walking poles. She continues to be challenged with stepping over obstacles which is a very important task of a mom of two young children.     Timed Up and Go (TUG): TUG is a test of basic mobility skills. It is used to screen individuals prone to falls.  Gait assistive device used: B walking poles     Patient score 12.6 seconds  ?13.5 seconds indicate at risk for falls in older adults according to Laila et al 2000.  ?30 seconds - indicates dependency in most ADL and mobility skills according to Posjro & Morales 1991  >11.5 seconds indicate at risk for falls in adults with Parkinson's Disease    Minimal Detectable Change for patients with Alzheimer s = 4.09 sec   Minimal Detectable  Change for patients with Parkinson s Disease = 3.5 sec   according to Pari & Julio Cesar Coronel 2011    Normative Data from Kiesha [place], Burton D, Raven M, Sheldon E, Alex. 2017  Age                 Average Time (in seconds)  20-39                     5.9-7.4         40-59                     6.3-7.8   60-69                     7.1-9.0  70-79                     8.2-10.2  80-99                    10.0-12.7            Assessment (rationale for performing, application to patient s function & care plan): In order to assess fall risk and ease of functional balance for home assessment.  Pt improved TUG from 13.47 to 12.6 seconds with walking poles. She demonstrates improved stability with turns and sit<>stand transfers.              (Minutes billed as physical performance test)

## 2022-11-04 NOTE — PROGRESS NOTES
[facility]    OUTPATIENT PHYSICAL THERAPY  PLAN OF TREATMENT FOR OUTPATIENT REHABILITATION AND PROGRESS NOTE           Patient's Last Name, First Name, Rosemary Allen Date of Birth  1990   Provider's Name  [facility] Medical Record No.  [id number]    Onset Date  7/22/2022 Start of Care Date  8/1/2022   Type:     _X_PT   ___OT   ___SLP Medical Diagnosis  Ataxia   PT Diagnosis  Impaired gait and ambulation, muscle weakness, impaired balance Plan of Treatment  Frequency/Duration: 1x/wk  Certification date from 10/29/22 to 12/30/2022     Goals:  Goal Identifier DGI   Goal Description UPDATED: Pari will improve score on DGI to 15/24 or higher in order to decrease risk for falls.   Target Date 12/30/22   Date Met      Progress (detail required for progress note): PROGRESSING: Pt improved from 8/24 to 12/24. She remains high fall risk and currently using B walking sticks to assist with balance and stability     Goal Identifier 30 second STS   Goal Description Pari will perform 13 sit to stands in 30 seconds with no overt instabilities or LOB in order to improve LE strength and endurance needed for safe gait and ambulation.   Target Date 12/30/22   Date Met      Progress (detail required for progress note): No change: Pt initially completed 10 reps with posterior LOB and currently 9 reps without UE A - slight stabilization on chair with back of knees, but no LOB.     Goal Identifier TUG   Goal Description Pari will perform the TUG in less than 13 seconds with no apparent instabilities or LOB in order to improve dynamic balance and decrease fall risk.   Target Date 10/29/22   Date Met  10/31/22   Progress (detail required for progress note): MET: Pt initially completed TUG in 13.47 seconds and now able to complete in 12.6 seconds with B walking poles  "      Beginning/End Dates of Progress Note Reporting Period:  8/1/2022 to 10/31/22    Progress Toward Goals:   Progress this reporting period: Patient is making progress towards goals. She is now using walking poles which has improved her balance and she now scores low fall risk on the TUG.  Dynamic activities are still challenging for her especially stepping over obstacles which is something she needs to do on a regular basis as she has 2 young kids at home. Continued PT is indicated to continue addressing fall risk and safety with household and community mobility while improving proximal mm strength to assist with ataxia management.    Client Self (Subjective) Report for Progress Note Reporting Period: Pt has been doing her HEP, however feels her days are very busy with home activities.  No falls since last session.    Objective Measurements:   Objective Measure: DGI  Details: 12/24 with walking poles  Objective Measure: 30\" Chair Stand  Details: 9 reps - no LOB this date  Objective Measure: TUG  Details: 12.6 seconds with B walking poles            I CERTIFY THE NEED FOR THESE SERVICES FURNISHED UNDER        THIS PLAN OF TREATMENT AND WHILE UNDER MY CARE     (Physician co-signature of this document indicates review and certification of the therapy plan).                Referring Provider: MD Wilfred Ayala, PT    "

## 2022-11-07 ENCOUNTER — HOSPITAL ENCOUNTER (OUTPATIENT)
Dept: PHYSICAL THERAPY | Facility: CLINIC | Age: 32
Discharge: HOME | End: 2022-11-07
Payer: COMMERCIAL

## 2022-11-07 DIAGNOSIS — R26.89 BALANCE PROBLEMS: ICD-10-CM

## 2022-11-07 DIAGNOSIS — R27.0 ATAXIA: Primary | ICD-10-CM

## 2022-11-07 DIAGNOSIS — M62.81 MUSCLE WEAKNESS (GENERALIZED): ICD-10-CM

## 2022-11-07 PROCEDURE — 97112 NEUROMUSCULAR REEDUCATION: CPT | Mod: GP | Performed by: PHYSICAL THERAPIST

## 2022-11-07 PROCEDURE — 97110 THERAPEUTIC EXERCISES: CPT | Mod: GP | Performed by: PHYSICAL THERAPIST

## 2022-11-14 ENCOUNTER — HOSPITAL ENCOUNTER (OUTPATIENT)
Dept: PHYSICAL THERAPY | Facility: CLINIC | Age: 32
Discharge: HOME | End: 2022-11-14
Payer: COMMERCIAL

## 2022-11-14 DIAGNOSIS — M62.81 MUSCLE WEAKNESS (GENERALIZED): ICD-10-CM

## 2022-11-14 DIAGNOSIS — R26.89 BALANCE PROBLEMS: ICD-10-CM

## 2022-11-14 DIAGNOSIS — R27.0 ATAXIA: Primary | ICD-10-CM

## 2022-11-14 PROCEDURE — 97112 NEUROMUSCULAR REEDUCATION: CPT | Mod: GP | Performed by: PHYSICAL THERAPIST

## 2022-11-18 DIAGNOSIS — B96.89 ACUTE BACTERIAL SINUSITIS: Primary | ICD-10-CM

## 2022-11-18 DIAGNOSIS — J01.90 ACUTE BACTERIAL SINUSITIS: Primary | ICD-10-CM

## 2022-11-18 RX ORDER — DOXYCYCLINE 100 MG/1
100 CAPSULE ORAL 2 TIMES DAILY
Qty: 20 CAPSULE | Refills: 0 | Status: SHIPPED | OUTPATIENT
Start: 2022-11-18 | End: 2022-11-28

## 2022-11-18 NOTE — PATIENT INSTRUCTIONS
Doxycycline 1 tab 2 times daily for 7-10 days  - can cause sun sensitivity  - stay upright after taking it x 1 hr, or you can get a little acid reflux  - don't swallow w/ dairy

## 2022-12-19 ENCOUNTER — HOSPITAL ENCOUNTER (OUTPATIENT)
Dept: PHYSICAL THERAPY | Facility: CLINIC | Age: 32
Discharge: HOME | End: 2022-12-19
Payer: COMMERCIAL

## 2022-12-19 DIAGNOSIS — R26.89 BALANCE PROBLEMS: ICD-10-CM

## 2022-12-19 DIAGNOSIS — R27.0 ATAXIA: Primary | ICD-10-CM

## 2022-12-19 PROCEDURE — 97112 NEUROMUSCULAR REEDUCATION: CPT | Mod: GP | Performed by: PHYSICAL THERAPIST

## 2022-12-19 PROCEDURE — 97750 PHYSICAL PERFORMANCE TEST: CPT | Mod: GP | Performed by: PHYSICAL THERAPIST

## 2022-12-19 PROCEDURE — 97110 THERAPEUTIC EXERCISES: CPT | Mod: GP | Performed by: PHYSICAL THERAPIST

## 2022-12-19 NOTE — PROGRESS NOTES
12/19/22 1700   Signing Clinician's Name / Credentials   Signing clinician's name / credentials Osbaldo Rincon, SPT; Wilfred Montoya, PT, DPT, NCS   Dynamic Gait Index (Brandon and Kendrick Woodruff, 1995)   Gait Level Surface 2   Change in Gait Speed 2   Gait and Horizontal Head Turns 2   Gait with Vertical Head Turns 2   Gait and Pivot Turns 2   Step Over Obstacle 0   Step Around Obstacles 1   Steps 1   Total Dynamic Gait Index Score  (A score of 19 or less has been correlated to an increased risk of falls in community dwelling older adults, patients with vestibular disorders, and patients with MS.)   Total Score (out of 24) 12   Dynamic Gait Index (DGI):The DGI is a measure of balance during gait that is reliable and valid for the elderly and individuals with Parkinson's disease, MS, vestibular disorders, or s/p stroke. Gait assistive device used: None    Scores ?19 /24 indicate an increased risk for falls according to Tatyana et al 2000  Minimal Detectable Change = 2.9 in community dwelling elderly according to Matheus et al 2011    Assessment (rationale for performing, application to patient s function & care plan): Pt scored similarly to previous testing with a score of 12/24 with use of bilateral walking poles, indicating a continued increased fall risk. Pt continues to be challenged by obstacle and stair navigation. However, movement quality has improved since previous testing with decreased path deviations and episodes of imbalance resulting from testing conditions.   (Minutes billed as physical performance test): 15

## 2022-12-20 NOTE — PROGRESS NOTES
"[facility]    Outpatient Physical Therapy Discharge Note  Patient: Rosemary Davis  : 1990    Beginning/End Dates of Reporting Period:  10/29/2022 to 2022    Referring Provider: Estrellita Keith MD    Therapy Diagnosis: Impaired gait and ambulation, muscle weakness, impaired balance     Client Self Report:  No new updates.     Objective Measurements:  Objective Measure: DGI  Details:  with bilateral walking poles  Objective Measure: 30\" Chair Stand  Details: 9 reps, anterior LOB following quick transition     Goals:  Goal Identifier DGI   Goal Description UPDATED: Pari will improve score on DGI to 15/24 or higher in order to decrease risk for falls.   Target Date 22   Date Met      Progress (detail required for progress note): PROGRESSING: Pt improved from  to . She remains high fall risk and currently using B walking sticks to assist with balance and stability; :  with bilateral walking sticks     Goal Identifier 30 second STS   Goal Description Pari will perform 13 sit to stands in 30 seconds with no overt instabilities or LOB in order to improve LE strength and endurance needed for safe gait and ambulation.   Target Date 22   Date Met      Progress (detail required for progress note): No change: Pt initially completed 10 reps with posterior LOB and currently 9 reps without UE A - slight stabilization on chair with back of knees, but no LOB; : 9 reps, one anterior LOB following quick transition     Goal Identifier TUG   Goal Description Pari will perform the TUG in less than 13 seconds with no apparent instabilities or LOB in order to improve dynamic balance and decrease fall risk.   Target Date 10/29/22   Date Met  10/31/22   Progress (detail required for progress note): MET: Pt initially completed TUG in 13.47 seconds and now able to complete in " "12.6 seconds with B walking poles       Plan:  Discharge from therapy.    Reason for Discharge: Pt has demonstrated significant progress towards DGI and 30\" Chair Stand goals from the initial evaluation but objectively tested similiarly to previous testing session with scores of 12 and 9 reps, respectively. Overall movement quality and safety have continued to progress although these changes are not reflected in the objective testing measures. Pt expressed noting improvement to safely perform everyday activities. Pt encouraged to return to therapy at a later time to maintain and progress function.     Discharge Plan: Patient to continue home program.  "

## 2022-12-30 ENCOUNTER — TELEPHONE (OUTPATIENT)
Dept: OPTOMETRY | Facility: CLINIC | Age: 32
End: 2022-12-30

## 2022-12-30 NOTE — TELEPHONE ENCOUNTER
Last glasses Rx mailed and also sent via [employer] for pt record (copied/pasted)    Aryan Smith RN 2:39 PM 12/30/22            [facility] Center    Phone Message    May a detailed message be left on voicemail: yes     Reason for Call: Form or Letter   Type or form/letter needing completion: Eyeglass Rx  Provider: Dr. May  Date form needed: ASAP  Once completed: Mail form to Name: Rosemary Davis, at Address: [address] [place], [place] [id number]     Pt was able to locate her CL Rx on [facility] but not her eyeglass Rx.      Action Taken: Message routed to:  [facility] EYE    Travel Screening: Not Applicable

## 2023-01-07 ENCOUNTER — HEALTH MAINTENANCE LETTER (OUTPATIENT)
Age: 33
End: 2023-01-07

## 2023-03-27 ENCOUNTER — OFFICE VISIT (OUTPATIENT)
Dept: FAMILY MEDICINE | Facility: CLINIC | Age: 33
End: 2023-03-27

## 2023-03-27 VITALS
HEART RATE: 108 BPM | RESPIRATION RATE: 16 BRPM | OXYGEN SATURATION: 97 % | TEMPERATURE: 98.4 F | BODY MASS INDEX: 37.3 KG/M2 | SYSTOLIC BLOOD PRESSURE: 99 MMHG | HEIGHT: 60 IN | DIASTOLIC BLOOD PRESSURE: 72 MMHG | WEIGHT: 190 LBS

## 2023-03-27 DIAGNOSIS — R07.0 THROAT PAIN: Primary | ICD-10-CM

## 2023-03-27 LAB
INFLUENZA A (RMG): NEGATIVE
INFLUENZA B (RMG): NEGATIVE
S PYO AG THROAT QL IA.RAPID: NEGATIVE
SARS ANTIGEN (RMG): NEGATIVE

## 2023-03-27 PROCEDURE — 99213 OFFICE O/P EST LOW 20 MIN: CPT | Performed by: FAMILY MEDICINE

## 2023-03-27 PROCEDURE — 87428 SARSCOV & INF VIR A&B AG IA: CPT | Mod: QW | Performed by: FAMILY MEDICINE

## 2023-03-27 PROCEDURE — 87430 STREP A AG IA: CPT | Performed by: FAMILY MEDICINE

## 2023-03-27 RX ORDER — CLOBETASOL PROPIONATE 0.5 MG/G
OINTMENT TOPICAL
COMMUNITY
Start: 2023-01-31

## 2023-03-27 ASSESSMENT — PATIENT HEALTH QUESTIONNAIRE - PHQ9: SUM OF ALL RESPONSES TO PHQ QUESTIONS 1-9: 8

## 2023-03-27 NOTE — PROGRESS NOTES
SUBJECTIVE:   Rosemary Davis  is a 33 year old  year old female presenting with a complaint of  Cough.   The  cough non-productive, productive of green/yellow sputum, waxing and waning over time for 1 weeks.   Quality: poor and strong  Severity: moderate  Associated symptoms: nasal congestion, sore throat, headache and myalgias.  Current Outpatient Medications   Medication Sig Dispense Refill     citalopram (CELEXA) 40 MG tablet Take 1 tablet (40 mg) by mouth daily TAKE ONE TAB BY MOUTH DAILY 90 tablet 3     clobetasol (TEMOVATE) 0.05 % external ointment APPLY A THIN LAYER TO AFFECTED AREA(S) TWICE WEEKLY       benzonatate (TESSALON) 100 MG capsule Take 1 capsule (100 mg) by mouth 3 times daily as needed for cough (Patient not taking: Reported on 3/27/2023) 20 capsule 1     guaiFENesin-dextromethorphan (ROBITUSSIN DM) 100-10 MG/5ML syrup Take 5-10 mLs by mouth every 4 hours as needed for cough (Patient not taking: Reported on 3/27/2023) 236 mL 0     Social History     Tobacco Use     Smoking status: Never     Smokeless tobacco: Never   Substance Use Topics     Alcohol use: Not Currently       ROS:  EYES:  no vision changes, no redness or irritation, no discharge or mattering  ENT: no ear pain, no nose or sinus problems, no mouth or throat problems, no dysphagia, no hoarseness  ROS otherwise negative    OBJECTIVE  :BP 99/72   Pulse 108   Temp 98.4  F (36.9  C) (Temporal)   Resp 16   Ht 1.524 m (5')   Wt 86.2 kg (190 lb)   SpO2 97%   BMI 37.11 kg/m     APPEARANCE: = Nrl  Ears/Nose = Nrl  Ear canals and TM's = Nrl  Oropharynx = Nrl  Neck = Nrl  Resp effort = Nrl  Mood/Affect = Nrl    ASSESSMENT:  1. Throat pain    - Rapid Strep ([facility])  - Beta Strep Grp A Cult ([facility])  - Influenza + SARS Antigen ([facility])  Recommend conservative cares including fluids, rest, OTC decongestants/expectorants as needed, tylenol or NSAIDs with food.  Beware of GI side effectcs (GI upset, GI bleed in worse case) with NSAIDs.  RTC  or call if no improvement or any changes in sx.      Luis Reza MD  [facility]  [phone]

## 2023-03-29 LAB — BETA STREP GP A CULTURE: POSITIVE

## 2023-03-30 DIAGNOSIS — J02.0 STREPTOCOCCAL PHARYNGITIS: Primary | ICD-10-CM

## 2023-03-30 RX ORDER — AZITHROMYCIN 250 MG/1
TABLET, FILM COATED ORAL
Qty: 6 TABLET | Refills: 0 | Status: SHIPPED | OUTPATIENT
Start: 2023-03-30 | End: 2023-07-27

## 2023-04-22 ENCOUNTER — HEALTH MAINTENANCE LETTER (OUTPATIENT)
Age: 33
End: 2023-04-22

## 2023-05-31 ENCOUNTER — OFFICE VISIT (OUTPATIENT)
Dept: OPTOMETRY | Facility: CLINIC | Age: 33
End: 2023-05-31
Payer: COMMERCIAL

## 2023-05-31 ENCOUNTER — TELEPHONE (OUTPATIENT)
Dept: OPTOMETRY | Facility: CLINIC | Age: 33
End: 2023-05-31

## 2023-05-31 DIAGNOSIS — H52.13 MYOPIA OF BOTH EYES: Primary | ICD-10-CM

## 2023-05-31 ASSESSMENT — REFRACTION_CURRENTRX
OD_BASECURVE: 8.6
OS_SPHERE: -4.25
OS_BASECURVE: 8.6
OS_DIAMETER: 14.2
OD_SPHERE: -4.25
OD_DIAMETER: 14.2
OS_BRAND: AIR OPTIX HYDRAGLYDE
OD_BRAND: AIR OPTIX HYDRAGLYDE

## 2023-05-31 NOTE — PROGRESS NOTES
No office visit. CL order only    Contact Lens Billing  V-Code:  - Soft spherical  Final Contact Lens Rx       Brand Base Curve Diameter Sphere    Right Air Optix Hydraglyde 8.6 14.2 -4.25    Left Air Optix Hydraglyde 8.6 14.2 -4.25         [facility] order mailed direct: #[id number]   # of units: 2 boxes  Price per Unit: $55 per box + $7.95 shipping = $117.95 total    These are for cosmetic contact lenses.    Encounter Diagnosis   Name Primary?     Myopia of both eyes Yes        Date of last eye exam: 2/25/22

## 2023-07-27 ENCOUNTER — VIRTUAL VISIT (OUTPATIENT)
Dept: FAMILY MEDICINE | Facility: CLINIC | Age: 33
End: 2023-07-27

## 2023-07-27 DIAGNOSIS — G11.8 SPINOCEREBELLAR ATAXIA (H): ICD-10-CM

## 2023-07-27 DIAGNOSIS — F41.1 GENERALIZED ANXIETY DISORDER: Primary | ICD-10-CM

## 2023-07-27 DIAGNOSIS — F33.1 MAJOR DEPRESSIVE DISORDER, RECURRENT EPISODE, MODERATE (H): ICD-10-CM

## 2023-07-27 PROCEDURE — 99213 OFFICE O/P EST LOW 20 MIN: CPT | Mod: 95

## 2023-07-27 RX ORDER — BUSPIRONE HYDROCHLORIDE 5 MG/1
5 TABLET ORAL 2 TIMES DAILY
Qty: 60 TABLET | Refills: 0 | Status: SHIPPED | OUTPATIENT
Start: 2023-07-27 | End: 2023-09-11

## 2023-07-27 ASSESSMENT — PATIENT HEALTH QUESTIONNAIRE - PHQ9
5. POOR APPETITE OR OVEREATING: SEVERAL DAYS
SUM OF ALL RESPONSES TO PHQ QUESTIONS 1-9: 5

## 2023-07-27 ASSESSMENT — ANXIETY QUESTIONNAIRES
2. NOT BEING ABLE TO STOP OR CONTROL WORRYING: SEVERAL DAYS
GAD7 TOTAL SCORE: 7
5. BEING SO RESTLESS THAT IT IS HARD TO SIT STILL: NOT AT ALL
7. FEELING AFRAID AS IF SOMETHING AWFUL MIGHT HAPPEN: SEVERAL DAYS
GAD7 TOTAL SCORE: 7
1. FEELING NERVOUS, ANXIOUS, OR ON EDGE: SEVERAL DAYS
3. WORRYING TOO MUCH ABOUT DIFFERENT THINGS: SEVERAL DAYS
IF YOU CHECKED OFF ANY PROBLEMS ON THIS QUESTIONNAIRE, HOW DIFFICULT HAVE THESE PROBLEMS MADE IT FOR YOU TO DO YOUR WORK, TAKE CARE OF THINGS AT HOME, OR GET ALONG WITH OTHER PEOPLE: SOMEWHAT DIFFICULT
6. BECOMING EASILY ANNOYED OR IRRITABLE: MORE THAN HALF THE DAYS

## 2023-07-27 NOTE — PROGRESS NOTES
Pari is a 33 year old who is being evaluated via a billable video visit.      How would you like to obtain your AVS? MyChart  If the video visit is dropped, the invitation should be resent by: Text to cell phone: [phone]  Will anyone else be joining your video visit? No          Assessment & Plan     Generalized anxiety disorder  -continue on citalopram and will add buspar given increasing anxiety  -Patient verbalized understanding and is agreeable to the discussed plan of care.  -Education about adverse effects of prescription medication discussed  -Red flags that warrant emergent evaluation discussed  - Adult Mental Health  Referral  - busPIRone (BUSPAR) 5 MG tablet  Dispense: 60 tablet; Refill: 0    Spinocerebellar ataxia (H)  -patient being worked up for this by neurology and will be undergoing genetic testing in a couple weeks as well     Major depressive disorder, recurrent episode, moderate (H)  -patient reports being more anxious than depressed - new life stressors with her ataxia worsening, being on disability, 2 children younger than 5 years old and also having marital struggles   -placed counseling consult - patient will check to see where she is covered and then will call clinic to have referral faxed to where she has coverage  - Adult Mental Health  Referral  - busPIRone (BUSPAR) 5 MG tablet  Dispense: 60 tablet; Refill: 0      Prescription drug management         BMI:   Estimated body mass index is 37.11 kg/m  as calculated from the following:    Height as of 3/27/23: 1.524 m (5').    Weight as of 3/27/23: 86.2 kg (190 lb).       See Patient Instructions    No follow-ups on file.    AMELIE Celestin [facility]    Subjective   Pari is a 33 year old, presenting for the following health issues:  Recheck Medication (Citalopram. /PHQ9/GAD7 completed online.)    HPI     Patient last seen on 1/2022 where her citalopram was increased to 40mg, has not had follow up in  regards to depression since this virtual visit.   Today patient states having increasing anxiety surrounding her spinocerebellar ataxia that is worsening and requiring more work up, she is currently on disability and stays home with her two children under 5 years old, and also is having marital issues   She denies suicidal ideation    Jermaine Damian - neurology - ataxia   Depression Followup  How are you doing with your depression since your last visit? Worsened - depressed regarding health but having more anxiety symptoms  Are you having other symptoms that might be associated with depression? Yes:  tearful  Have you had a significant life event?  Relationship Concerns, Financial Concerns, and OTHER: marital    Are you feeling anxious or having panic attacks?   No  Do you have any concerns with your use of alcohol or other drugs? No    Social History     Tobacco Use    Smoking status: Never    Smokeless tobacco: Never   Substance Use Topics    Alcohol use: Not Currently    Drug use: No         9/16/2022     3:09 PM 3/27/2023    12:39 PM 7/27/2023     8:11 AM   PHQ   PHQ-9 Total Score 4 8 5   Q9: Thoughts of better off dead/self-harm past 2 weeks Not at all Not at all Not at all         1/20/2022     3:22 PM 7/25/2022     3:31 PM 7/27/2023     8:11 AM   KARMEN-7 SCORE   Total Score 7 0 7         7/27/2023     8:11 AM   Last PHQ-9   1.  Little interest or pleasure in doing things 1   2.  Feeling down, depressed, or hopeless 0   3.  Trouble falling or staying asleep, or sleeping too much 1   4.  Feeling tired or having little energy 1   5.  Poor appetite or overeating 1   6.  Feeling bad about yourself 1   7.  Trouble concentrating 0   8.  Moving slowly or restless 0   Q9: Thoughts of better off dead/self-harm past 2 weeks 0   PHQ-9 Total Score 5   Difficulty at work, home, or with people Somewhat difficult         7/27/2023     8:11 AM   KARMEN-7    1. Feeling nervous, anxious, or on edge 1   2. Not being able to stop or  control worrying 1   3. Worrying too much about different things 1   4. Trouble relaxing 1   5. Being so restless that it is hard to sit still 0   6. Becoming easily annoyed or irritable 2   7. Feeling afraid, as if something awful might happen 1   KARMEN-7 Total Score 7   If you checked any problems, how difficult have they made it for you to do your work, take care of things at home, or get along with other people? Somewhat difficult               Review of Systems   Constitutional, HEENT, cardiovascular, pulmonary, gi and gu systems are negative, except as otherwise noted.      Objective           Vitals:  No vitals were obtained today due to virtual visit.    Physical Exam   GENERAL: Healthy, alert and no distress  EYES: Eyes grossly normal to inspection.  No discharge or erythema, or obvious scleral/conjunctival abnormalities.  RESP: No audible wheeze, cough, or visible cyanosis.  No visible retractions or increased work of breathing.    SKIN: Visible skin clear. No significant rash, abnormal pigmentation or lesions.  NEURO: Cranial nerves grossly intact.  Mentation and speech appropriate for age.  PSYCH: Mentation appears normal, affect normal/bright, judgement and insight intact, normal speech and appearance well-groomed.                Video-Visit Details    Type of service:  Video Visit   Video Start Time:  8:30am  Video End Time: 9:00am    Originating Location (pt. Location): Home    Distant Location (provider location):  On-site  Platform used for Video Visit: [employer]

## 2023-08-02 DIAGNOSIS — F41.1 GENERALIZED ANXIETY DISORDER: ICD-10-CM

## 2023-08-02 DIAGNOSIS — F33.42 RECURRENT MAJOR DEPRESSIVE DISORDER, IN FULL REMISSION (H): ICD-10-CM

## 2023-08-02 RX ORDER — CITALOPRAM HYDROBROMIDE 40 MG/1
TABLET ORAL
Qty: 90 TABLET | Refills: 3 | Status: SHIPPED | OUTPATIENT
Start: 2023-08-02 | End: 2023-12-18

## 2023-08-02 NOTE — TELEPHONE ENCOUNTER
Citalopram  LOV 7/27/23-VV  Generalized anxiety disorder  -continue on citalopram and will add buspar given increasing anxiety  -Patient verbalized understanding and is agreeable to the discussed plan of care.  -Education about adverse effects of prescription medication discussed  -Red flags that warrant emergent evaluation discussed  - Adult Mental Health  Referral  - busPIRone (BUSPAR) 5 MG tablet  Dispense: 60 tablet; Refill: 0  Major depressive disorder, recurrent episode, moderate (H)  -patient reports being more anxious than depressed - new life stressors with her ataxia worsening, being on disability, 2 children younger than 5 years old and also having marital struggles   -placed counseling consult - patient will check to see where she is covered and then will call clinic to have referral faxed to where she has coverage  - Adult Mental Health  Referral  - busPIRone (BUSPAR) 5 MG tablet  Dispense: 60 tablet; Refill: 0  BP Readings from Last 3 Encounters:   03/27/23 99/72   07/25/22 116/78   07/22/22 124/84

## 2023-08-11 ENCOUNTER — LAB (OUTPATIENT)
Dept: LAB | Facility: CLINIC | Age: 33
End: 2023-08-11
Payer: COMMERCIAL

## 2023-08-11 ENCOUNTER — OFFICE VISIT (OUTPATIENT)
Dept: NEUROLOGY | Facility: CLINIC | Age: 33
End: 2023-08-11
Payer: COMMERCIAL

## 2023-08-11 ENCOUNTER — VIRTUAL VISIT (OUTPATIENT)
Dept: NEUROLOGY | Facility: CLINIC | Age: 33
End: 2023-08-11
Payer: COMMERCIAL

## 2023-08-11 DIAGNOSIS — R27.0 ATAXIA: ICD-10-CM

## 2023-08-11 DIAGNOSIS — R27.0 ATAXIA: Primary | ICD-10-CM

## 2023-08-11 LAB
INTERPRETATION: NORMAL
LAB PDF RESULT: NORMAL
SIGNIFICANT RESULTS: NORMAL
SPECIMEN DESCRIPTION: NORMAL
TEST DETAILS, MDL: NORMAL

## 2023-08-11 PROCEDURE — 36415 COLL VENOUS BLD VENIPUNCTURE: CPT | Performed by: PATHOLOGY

## 2023-08-11 PROCEDURE — 99207 PR BUNDLED PROCEDURE IN GLOBAL PKG: CPT | Mod: 95 | Performed by: GENETIC COUNSELOR, MS

## 2023-08-11 PROCEDURE — 99214 OFFICE O/P EST MOD 30 MIN: CPT | Mod: GC | Performed by: PSYCHIATRY & NEUROLOGY

## 2023-08-11 RX ORDER — DESONIDE 0.5 MG/G
OINTMENT TOPICAL
COMMUNITY
Start: 2023-08-10 | End: 2023-12-18

## 2023-08-11 NOTE — LETTER
2023       RE: Rosemary Davis  [address] [facility] [address]       Dear Colleague,    Thank you for referring your patient, Rosemary Davis, to the [facility] [place] [facility]. Please see a copy of my visit note below.    Department of Neurology  Movement Disorders Division   Ataxia Clinic  Follow-up Note    Patient: Rosemary Davis   MRN: [id number]   : 1990   Date of Visit: 23    Ms. 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 She was last seen on 2022, at which time plan was made to undergo physical therapy and return to clinic as needed. Today, she is accompanied by her mother.    In the past, she has been evaluated for reversible causes of ataxia (vitamin B12, folate, vitamin E, alpha-fetoprotein, thyroid globulin Ab, thyroid peroxidase Ab, TTG, KARMEN) which were normal. MRI's in the past have not demonstrated cerebellar atrophy.    INTERVAL EVENTS:  Since last visit, she reports that her ataxia has gotten progressively worse over the past year. She is falling about the same as prior, either trips or loses her balance and can't recover. Falls every 2 weeks approximately, not obviously mechanical. Has walking sticks that she uses outside the house. She last worked with physical therapy 2022.    No difficulty with swallowing, no change in speech, no issues w/ UE. She did have COVID in the end of 2022, brain fog is improved though mother notes she forgets details of events and dates.       MEDICATIONS reviewed & pertinent for:   No pertinent medications    ROS:  All others negative except as listed above.    Past Medical History:   Diagnosis Date    Ataxia     Depressive disorder     Humerus fracture initial injury  2012    ORIF left  humerus in 2012    Missed          Past Surgical History:   Procedure Laterality Date     SECTION N/A 2018    Procedure:  SECTION;  Surgeon: Geoffrey Trujillo MD;  Location:  L[facility]D     SECTION N/A 2022    Procedure: REPEAT  SECTION;  Surgeon: Geoffrey Trujillo MD;  Location:  L[facility]D    DENTAL SURGERY      wisdom extraction     EYE SURGERY      muscle repair for wandering eye--bilat    OPEN REDUCTION INTERNAL FIXATION ELBOW  2012    Procedure...:OPEN REDUCTION INTERNAL FIXATION ELBOW; REPAIR OF NON-UNION, LEFT ELBOW ; Surgeon:BREE NAVARRETE; Location: OR    OPEN REDUCTION INTERNAL FIXATION HUMERUS DISTAL  2012    Procedure:OPEN REDUCTION INTERNAL FIXATION HUMERUS DISTAL; ORIF LEFT DISTAL HUMERUS FRACTURE. SYNTHES ELBOW PLATES, MINI C-ARM; Surgeon:BREE NAVARRETE; Location: OR        Current Outpatient Medications   Medication Sig Dispense Refill    busPIRone (BUSPAR) 5 MG tablet Take 1 tablet (5 mg) by mouth 2 times daily for 30 days 60 tablet 0    citalopram (CELEXA) 40 MG tablet TAKE 1 TABLET BY MOUTH EVERY DAY 90 tablet 3    clobetasol (TEMOVATE) 0.05 % external ointment       desonide (DESOWEN) 0.05 % external ointment          Allergies   Allergen Reactions    Sulfa Antibiotics Nausea and Vomiting     Cold/shivering/conjunctival injection    Amoxicillin Hives        Family History   Adopted: Yes   Problem Relation Age of Onset    Glaucoma No family hx of     Macular Degeneration No family hx of         Social History     Socioeconomic History    Marital status:      Spouse name: Not on file    Number of children: Not on file    Years of education: Not on file    Highest education level: Not on file   Occupational History    Not on file   Tobacco Use    Smoking status: Never    Smokeless tobacco: Never   Substance and Sexual Activity    Alcohol use: Not Currently    Drug use: No    Sexual activity: Yes   Other Topics Concern     Parent/sibling w/ CABG, MI or angioplasty before 65F 55M? Not Asked   Social History Narrative    Not on file     Social Determinants of Health     Financial Resource Strain: Not on file   Food Insecurity: Not on file   Transportation Needs: Not on file   Physical Activity: Not on file   Stress: Not on file   Social Connections: Not on file   Intimate Partner Violence: Not on file   Housing Stability: Not on file        PHYSICAL EXAM:  There were no vitals taken for this visit.     Gen: alert, active, attentive, appropriately groomed     NEURO:  MS: Alert and oriented to person, place, time, and situation.  Speech normal to comprehension.  Recent and remote memory intact.  Attention and concentration normal.  Fund of knowledge normal.    CN:  Pupils equal, round, and reactive to light. VFF. EOM normal range, direction changing nystagmus.  Hypometric saccades. Facial sensation intact. Face symmetric at rest and with activation. Hearing grossly intact to conversation. Palate rise b/l, uvula midline, no palatal tremor.  Mildly ataxic speech. Trapezius and SCM 5/5 bilaterally. Tongue protrudes midline. No fasciculation or atrophy noted.    Motor:  Normal tone throughout upper and lower extremities. Strength is 5/5 throughout and symmetric.     Reflexes: 2+ throughout and symmetric.    Sensation:  Intact to LT in all extremities.    Coordination:  FTN and HTN with dysmetria bilaterally, mild.    Gait:  Wide based, ataxic gait. Able to stand with feet together for 5 seconds. Romberg negative.         ASSESSMENT:  Rosemary Davis is a 30 year old female with with use of tropia, nystagmus status post eye surgery, global developmental delay, schizencephaly and now with a history of ataxia.  Reversible causes of ataxia were excluded with normal vitamin B12, folate, vitamin E, alpha fetoprotein, thyroid globulin Ab, thyroid peroxidase Ab, TTG , KARMEN. She has not undergone genetic testing and her family history is  unknown due to adoption.    PLAN:  - genetic testing, whole genome    RTC 6mo     Patient was seen and discussed with attending physician, Dr. Wood.     Zach Lorenz MD  Fellow  Movement Disorders Division  [facility] Department of Neurology       Attestation signed by Suha Wood MD at 8/11/2023 11:15 AM:  I have personally interviewed and examined Ms. Riley Anastasia Frailing and agree with diagnosis and management. The total time spent with the patient was 30 minutes, over 50% of the time spent in counseling and coordinating care.      Again, thank you for allowing me to participate in the care of your patient.      Sincerely,    Suha Wood MD

## 2023-08-11 NOTE — NURSING NOTE
Patient denies any changes since echeck-in regarding medication and allergies and states all information entered during echeck-in remains accurate.    Is the patient currently in the state of [place]? YES    Visit mode:VIDEO    If the visit is dropped, the patient can be reconnected by: VIDEO VISIT: Text to cell phone: [phone]    Will anyone else be joining the visit? NO      How would you like to obtain your AVS? MyChart    Are changes needed to the allergy or medication list? NO    Reason for visit: RECHECK

## 2023-08-11 NOTE — PROGRESS NOTES
Department of Neurology  Movement Disorders Division   Ataxia Clinic  Follow-up Note    Patient: Rosemary Davis   MRN: [id number]   : 1990   Date of Visit: 23    Ms. 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 She was last seen on 2022, at which time plan was made to undergo physical therapy and return to clinic as needed. Today, she is accompanied by her mother.    In the past, she has been evaluated for reversible causes of ataxia (vitamin B12, folate, vitamin E, alpha-fetoprotein, thyroid globulin Ab, thyroid peroxidase Ab, TTG, KARMEN) which were normal. MRI's in the past have not demonstrated cerebellar atrophy.    INTERVAL EVENTS:  Since last visit, she reports that her ataxia has gotten progressively worse over the past year. She is falling about the same as prior, either trips or loses her balance and can't recover. Falls every 2 weeks approximately, not obviously mechanical. Has walking sticks that she uses outside the house. She last worked with physical therapy 2022.    No difficulty with swallowing, no change in speech, no issues w/ UE. She did have COVID in the end of 2022, brain fog is improved though mother notes she forgets details of events and dates.       MEDICATIONS reviewed & pertinent for:   No pertinent medications    ROS:  All others negative except as listed above.    Past Medical History:   Diagnosis Date    Ataxia     Depressive disorder     Humerus fracture initial injury  2012    ORIF left humerus in 2012    Missed          Past Surgical History:   Procedure Laterality Date     SECTION N/A 2018    Procedure:  SECTION;  Surgeon: Geoffrey Trujillo MD;  Location:  [facility]     SECTION N/A 2022    Procedure: REPEAT  SECTION;  Surgeon: Geoffrey Trujillo MD;  Location:  [facility]     DENTAL SURGERY      wisdom extraction     EYE SURGERY      muscle repair for wandering eye--bilat    OPEN REDUCTION INTERNAL FIXATION ELBOW  5/25/2012    Procedure...:OPEN REDUCTION INTERNAL FIXATION ELBOW; REPAIR OF NON-UNION, LEFT ELBOW ; Surgeon:BREE NAVARRETE; Location: OR    OPEN REDUCTION INTERNAL FIXATION HUMERUS DISTAL  1/28/2012    Procedure:OPEN REDUCTION INTERNAL FIXATION HUMERUS DISTAL; ORIF LEFT DISTAL HUMERUS FRACTURE. SYNTHES ELBOW PLATES, MINI C-ARM; Surgeon:BREE NAVARRETE; Location: OR        Current Outpatient Medications   Medication Sig Dispense Refill    busPIRone (BUSPAR) 5 MG tablet Take 1 tablet (5 mg) by mouth 2 times daily for 30 days 60 tablet 0    citalopram (CELEXA) 40 MG tablet TAKE 1 TABLET BY MOUTH EVERY DAY 90 tablet 3    clobetasol (TEMOVATE) 0.05 % external ointment       desonide (DESOWEN) 0.05 % external ointment          Allergies   Allergen Reactions    Sulfa Antibiotics Nausea and Vomiting     Cold/shivering/conjunctival injection    Amoxicillin Hives        Family History   Adopted: Yes   Problem Relation Age of Onset    Glaucoma No family hx of     Macular Degeneration No family hx of         Social History     Socioeconomic History    Marital status:      Spouse name: Not on file    Number of children: Not on file    Years of education: Not on file    Highest education level: Not on file   Occupational History    Not on file   Tobacco Use    Smoking status: Never    Smokeless tobacco: Never   Substance and Sexual Activity    Alcohol use: Not Currently    Drug use: No    Sexual activity: Yes   Other Topics Concern    Parent/sibling w/ CABG, MI or angioplasty before 65F 55M? Not Asked   Social History Narrative    Not on file     Social Determinants of Health     Financial Resource Strain: Not on file   Food Insecurity: Not on file   Transportation Needs: Not on file   Physical Activity: Not on file   Stress: Not on file   Social Connections: Not on file    Intimate Partner Violence: Not on file   Housing Stability: Not on file        PHYSICAL EXAM:  There were no vitals taken for this visit.     Gen: alert, active, attentive, appropriately groomed     NEURO:  MS: Alert and oriented to person, place, time, and situation.  Speech normal to comprehension.  Recent and remote memory intact.  Attention and concentration normal.  Fund of knowledge normal.    CN:  Pupils equal, round, and reactive to light. VFF. EOM normal range, direction changing nystagmus.  Hypometric saccades. Facial sensation intact. Face symmetric at rest and with activation. Hearing grossly intact to conversation. Palate rise b/l, uvula midline, no palatal tremor.  Mildly ataxic speech. Trapezius and SCM 5/5 bilaterally. Tongue protrudes midline. No fasciculation or atrophy noted.    Motor:  Normal tone throughout upper and lower extremities. Strength is 5/5 throughout and symmetric.     Reflexes: 2+ throughout and symmetric.    Sensation:  Intact to LT in all extremities.    Coordination:  FTN and HTN with dysmetria bilaterally, mild.    Gait:  Wide based, ataxic gait. Able to stand with feet together for 5 seconds. Romberg negative.         ASSESSMENT:  Rosemary Davis is a 30 year old female with with use of tropia, nystagmus status post eye surgery, global developmental delay, schizencephaly and now with a history of ataxia.  Reversible causes of ataxia were excluded with normal vitamin B12, folate, vitamin E, alpha fetoprotein, thyroid globulin Ab, thyroid peroxidase Ab, TTG , KARMEN. She has not undergone genetic testing and her family history is unknown due to adoption.    PLAN:  - genetic testing, whole genome    RTC 6mo     Patient was seen and discussed with attending physician, Dr. Wood.     Zach Lorenz MD  Fellow  Movement Disorders Division  [facility] Department of Neurology

## 2023-08-11 NOTE — LETTER
"8/11/2023       RE: Rosemary Davis  [address] [address]       Dear Colleague,    Thank you for referring your patient, Rosemary Davis, to the [facility] [place] at [facility]. Please see a copy of my visit note below.      GENETIC COUNSELING-Ataxia  I met with Rosemary \"Wandy\" and her mother for a 20 minute virtual visit. I was originally scheduled to meet with her in person, but due to personal medical issues, I had to convert her visit to a video visit. She was seen in person by Dr. Wood in clinic today.  Her primary reason for the visit is to discuss genetic testing.    MEDICAL HISTORY-  Please see Dr. Wood's clinic notes. We have previously evaluated Pari and she has ataxia of unclear etiology.  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  Her mother reports that she had some motor delays as a child. She was able to complete high school and 1 year of college with an IEP, and had accomodations due to vision issues.  She learned to walk at age 1.    Around age 28, a significant change was noted in her symptoms and she was diagnosed with ataxia. Her ataxia has progressed since that time.    FAMILY HISTORY-See scanned pedigree  Pari is adopted and we have no information about her birth history or her biological family history.  Pari has 2 biological children- both of whom are reported to be healthy (ages 1 and 5).    GENETIC COUNSELING-  We again reviewed the genetic and environmental basis of neurologic disease.  In most cases, it results from complex and lifelong interaction of multiple genetic and environmental factors. In some cases, there may be a single gene cause.  In Pari's case, her very young age of onset and the presence of structural brain anomalies strongly suggests the possibility of a genetic " etiology for her symptoms.    Pari has two three important motivations for pursuing testing:  She is concerned about the potential risk to her children.  Given that she is adopted, I believe that this is a very important concern as we have no information about her family history. It is very possible that there could be an autosomal dominant neurologic disorder in her family that could place her two children at 50% risk.  Alternatively, if we identify a recessive etiology for her symptoms, the risk would be very small.  In addition, we also discussed medically actionable secondary findings from whole genome sequencing. If testing identified a clear pathogenic variant in a group of medically important genes, this is information could be given to Pari. Again, this is important because if there is a hereditary cancer or cardiac disease susceptibility in her biological family, she would not be aware of this information.  Pari indicated that she would like to receive any medically actionable secondary findings from genetic testing.  Pari and her mother are concerned about whether there are additional medical interventions that we might be overlooking given that we do not have a precise diagnosis for Pari. Again, this is an important consideration. Many forms of hereditary ataxia can be accompanied by additional neurologic and/or non-neurologic features.  Some forms of hereditary ataxia may be accompanied by cardiomyopathy, diabetes, cancer risk, or endocrine disorders. These are all conditions for which there is affective screening and intervention. Thus, identifying a precise genetic basis for her ataxia may have direct bearing on our medical management and screening recommendations.  Finally, identifying a precise genetic diagnosis can provide an accurate basis for prognostic discussions.Some forms of ataxia can be relentlessly progressive and result in significant disability over a short period of time, while others  may be relatively or static over the course of decades. As a young mother of two children, this information is very important for her future planning.    We discussed possible testing options.  I explained that her clinical findings are not in keeping with the known repeat mediated ataxias (e.g. SCA1, 2, 3, 6, 7, 27B, CANVAS, Friedreich)- thus we do not think that there is utility in doing this testing for her.  I explained that given her complex neurologic presentation in childhood and the presence of structural CNS anomalies, we believe that whole exome sequencing would be the most reasonable first line test. I explained that this testing identifies a precise diagnosis in ~25-40% of cases and in complex situations like Pari's, we are not biased by having to 'pre-select' genes that we believe best fit her phenotype.  I did explain that some patients with genetic etiologies will have a negative exome- and that it is possible that we might encounter variants of uncertain clinical significance.    After our discussion, Pari consented to whole exome sequencing.  We believe that this testing is medically necessary for the reasons outlined above. We will attempt to obtain prior authorization before proceeding.            Again, thank you for allowing me to participate in the care of your patient.      Sincerely,    Oscar Iyer GC

## 2023-08-11 NOTE — NURSING NOTE
Pt is here for an ataxia recheck.Pt denies headaches. No problems choking when she drinks or eats. She does not notice a change in speech, her mom notices a change in speech form 10 years ago but not any change lately. Pt states she falls on the average of once ever 2 weeks, she is able to get herself up when she falls. She said her last time in physical therapy about a year ago.She said she sleep ok, She said sometimes she has problems falling asleep. She does not have double or blurred vision. Her last eye exam was 2022.

## 2023-08-11 NOTE — PROGRESS NOTES
"Virtual Visit Details    Type of service:  Video Visit   Video Start Time:  8:50 AM  Video End Time:9:10 AM    Originating Location (pt. Location): Other ONsite    Distant Location (provider location):  Off-site  Platform used for Video Visit: Aleksandra    GENETIC COUNSELING-Ataxia  I met with Rosemary \"Maciej" and her mother for a 20 minute virtual visit. I was originally scheduled to meet with her in person, but due to personal medical issues, I had to convert her visit to a video visit. She was seen in person by Dr. Wood in clinic today.  Her primary reason for the visit is to discuss genetic testing.    MEDICAL HISTORY-  Please see Dr. Wood's clinic notes. We have previously evaluated Pari and she has ataxia of unclear etiology.  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  Her mother reports that she had some motor delays as a child. She was able to complete high school and 1 year of college with an IEP, and had accomodations due to vision issues.  She learned to walk at age 1.    Around age 28, a significant change was noted in her symptoms and she was diagnosed with ataxia. Her ataxia has progressed since that time.    FAMILY HISTORY-See scanned pedigree  Pari is adopted and we have no information about her birth history or her biological family history.  Pari has 2 biological children- both of whom are reported to be healthy (ages 1 and 5).    GENETIC COUNSELING-  We again reviewed the genetic and environmental basis of neurologic disease.  In most cases, it results from complex and lifelong interaction of multiple genetic and environmental factors. In some cases, there may be a single gene cause.  In Pari's case, her very young age of onset and the presence of structural brain anomalies strongly suggests the possibility of a genetic etiology for her symptoms.    Pari has two three important motivations for pursuing " testing:  She is concerned about the potential risk to her children.  Given that she is adopted, I believe that this is a very important concern as we have no information about her family history. It is very possible that there could be an autosomal dominant neurologic disorder in her family that could place her two children at 50% risk.  Alternatively, if we identify a recessive etiology for her symptoms, the risk would be very small.  In addition, we also discussed medically actionable secondary findings from whole genome sequencing. If testing identified a clear pathogenic variant in a group of medically important genes, this is information could be given to Pari. Again, this is important because if there is a hereditary cancer or cardiac disease susceptibility in her biological family, she would not be aware of this information.  Pari indicated that she would like to receive any medically actionable secondary findings from genetic testing.  Pari and her mother are concerned about whether there are additional medical interventions that we might be overlooking given that we do not have a precise diagnosis for Pari. Again, this is an important consideration. Many forms of hereditary ataxia can be accompanied by additional neurologic and/or non-neurologic features.  Some forms of hereditary ataxia may be accompanied by cardiomyopathy, diabetes, cancer risk, or endocrine disorders. These are all conditions for which there is affective screening and intervention. Thus, identifying a precise genetic basis for her ataxia may have direct bearing on our medical management and screening recommendations.  Finally, identifying a precise genetic diagnosis can provide an accurate basis for prognostic discussions.Some forms of ataxia can be relentlessly progressive and result in significant disability over a short period of time, while others may be relatively or static over the course of decades. As a young mother of two  children, this information is very important for her future planning.    We discussed possible testing options.  I explained that her clinical findings are not in keeping with the known repeat mediated ataxias (e.g. SCA1, 2, 3, 6, 7, 27B, CANVAS, Friedreich)- thus we do not think that there is utility in doing this testing for her.  I explained that given her complex neurologic presentation in childhood and the presence of structural CNS anomalies, we believe that whole exome sequencing would be the most reasonable first line test. I explained that this testing identifies a precise diagnosis in ~25-40% of cases and in complex situations like Pari's, we are not biased by having to 'pre-select' genes that we believe best fit her phenotype.  I did explain that some patients with genetic etiologies will have a negative exome- and that it is possible that we might encounter variants of uncertain clinical significance.    After our discussion, Pari consented to whole exome sequencing.  We believe that this testing is medically necessary for the reasons outlined above. We will attempt to obtain prior authorization before proceeding.    Jermaine Iyer MS, [facility]  Certified Genetic Counselor

## 2023-08-11 NOTE — PATIENT INSTRUCTIONS
Your exam is stable from prior, and you have significant trouble walking.     We will pursue genetic testing to see if we can determine a cause for your symptoms.

## 2023-08-16 LAB — INTERPRETATION: NORMAL

## 2023-08-22 ENCOUNTER — TELEPHONE (OUTPATIENT)
Dept: LAB | Facility: CLINIC | Age: 33
End: 2023-08-22
Payer: COMMERCIAL

## 2023-08-22 NOTE — PROGRESS NOTES
Notified Pari that no prior authorization is required for genetic testing. Explained there's no option to guarantee coverage of testing upfront by insurance.    Explained that insurance benefits may still apply, therefore, there could be an out of pocket cost. However, Pair was aware that insurance may not cover the cost of testing and would be responsible for the billed amount.     Pari expressed understanding and stated that She wants to proceed with testing. We will call when results are available. Pari had no further questions. [facility] Hold For Preauthorization: [[id number]] order was placed by a genetics provider.    Bethany Alarcon  Genomics Billing    [facility]   [facility]  [address] [place] [id number]  [phone]

## 2023-08-23 ENCOUNTER — TELEPHONE (OUTPATIENT)
Dept: OPTOMETRY | Facility: CLINIC | Age: 33
End: 2023-08-23

## 2023-08-23 NOTE — TELEPHONE ENCOUNTER
[facility] Call Center    Phone Message    May a detailed message be left on voicemail: yes     Reason for Call: Other: Patient called stating the contacts she has are very dry and is running out of them faster than normal due to this. She is asking for a new brand. Please call to advise.      Action Taken: Other: eye    Travel Screening: Not Applicable

## 2023-09-05 ENCOUNTER — LAB (OUTPATIENT)
Dept: LAB | Facility: CLINIC | Age: 33
End: 2023-09-05
Payer: COMMERCIAL

## 2023-09-05 DIAGNOSIS — R27.0 ATAXIA: Primary | ICD-10-CM

## 2023-09-05 PROCEDURE — 81415 EXOME SEQUENCE ANALYSIS: CPT | Performed by: GENETIC COUNSELOR, MS

## 2023-09-07 ENCOUNTER — VIRTUAL VISIT (OUTPATIENT)
Dept: FAMILY MEDICINE | Facility: CLINIC | Age: 33
End: 2023-09-07

## 2023-09-07 DIAGNOSIS — G11.8 SPINOCEREBELLAR ATAXIA (H): ICD-10-CM

## 2023-09-07 DIAGNOSIS — F33.1 MAJOR DEPRESSIVE DISORDER, RECURRENT EPISODE, MODERATE (H): Primary | ICD-10-CM

## 2023-09-07 DIAGNOSIS — F41.1 GENERALIZED ANXIETY DISORDER: ICD-10-CM

## 2023-09-07 PROCEDURE — 99213 OFFICE O/P EST LOW 20 MIN: CPT | Mod: 95

## 2023-09-07 NOTE — PROGRESS NOTES
Pari is a 33 year old who is being evaluated via a billable video visit.      How would you like to obtain your AVS? [employer]  If the video visit is dropped, the invitation should be resent by: Text to cell phone: [phone]  Will anyone else be joining your video visit? No          Assessment & Plan     Major depressive disorder, recurrent episode, moderate (H)  -stable on citalopram and buspar, will provide 1 year refills for both  -patient in better spirits today and like new medicinal regimen for her mental health, also seeing a therapist which is great  -Education about adverse effects of prescription medication discussed  -Red flags that warrant emergent evaluation discussed  -Patient verbalized understanding and is agreeable to the discussed plan of care.    Generalized anxiety disorder  -see plan above    Spinocerebellar ataxia (H)  -patient being worked up for this by neurology, awaiting genetic testing results    I did advise patient to be seen in clinic for her annual physical whenever she gets time, she last had a physical in 12/2021. Patient verbalizes understanding and will schedule prior to the end of this year.       Prescription drug management         BMI:   Estimated body mass index is 37.11 kg/m  as calculated from the following:    Height as of 3/27/23: 1.524 m (5').    Weight as of 3/27/23: 86.2 kg (190 lb).       See Patient Instructions    No follow-ups on file.    AMELIE Celestin [facility]    Subjective   Pari is a 33 year old, presenting for the following health issues:  MH Follow Up ([facility]  meds- citalopram and buspirone. Sent PHQ9/GAD7 via [employer])    HPI     Depression and Anxiety Follow-Up  How are you doing with your depression since your last visit? Improved   How are you doing with your anxiety since your last visit?  Improved   Are you having other symptoms that might be associated with depression or anxiety? No  Have you had a significant life event? No   Do  you have any concerns with your use of alcohol or other drugs? No    Social History     Tobacco Use    Smoking status: Never    Smokeless tobacco: Never   Substance Use Topics    Alcohol use: Not Currently    Drug use: No         9/16/2022     3:09 PM 3/27/2023    12:39 PM 7/27/2023     8:11 AM   PHQ   PHQ-9 Total Score 4 8 5   Q9: Thoughts of better off dead/self-harm past 2 weeks Not at all Not at all Not at all         7/25/2022     3:31 PM 7/27/2023     8:11 AM 8/14/2023     9:16 AM   KARMEN-7 SCORE   Total Score   5 (mild anxiety)   Total Score 0 7 5         7/27/2023     8:11 AM   Last PHQ-9   1.  Little interest or pleasure in doing things 1   2.  Feeling down, depressed, or hopeless 0   3.  Trouble falling or staying asleep, or sleeping too much 1   4.  Feeling tired or having little energy 1   5.  Poor appetite or overeating 1   6.  Feeling bad about yourself 1   7.  Trouble concentrating 0   8.  Moving slowly or restless 0   Q9: Thoughts of better off dead/self-harm past 2 weeks 0   PHQ-9 Total Score 5   Difficulty at work, home, or with people Somewhat difficult         8/14/2023     9:16 AM   KARMEN-7    1. Feeling nervous, anxious, or on edge 0   2. Not being able to stop or control worrying 1   3. Worrying too much about different things 1   4. Trouble relaxing 1   5. Being so restless that it is hard to sit still 0   6. Becoming easily annoyed or irritable 2   7. Feeling afraid, as if something awful might happen 0   KARMEN-7 Total Score 5   If you checked any problems, how difficult have they made it for you to do your work, take care of things at home, or get along with other people? Somewhat difficult       Suicide Assessment Five-step Evaluation and Treatment (SAFE-T)          Review of Systems   Constitutional, HEENT, cardiovascular, pulmonary, gi and gu systems are negative, except as otherwise noted.      Objective           Vitals:  No vitals were obtained today due to virtual visit.    Physical Exam    GENERAL: Healthy, alert and no distress  EYES: Eyes grossly normal to inspection.  No discharge or erythema, or obvious scleral/conjunctival abnormalities.  RESP: No audible wheeze, cough, or visible cyanosis.  No visible retractions or increased work of breathing.    SKIN: Visible skin clear. No significant rash, abnormal pigmentation or lesions.  NEURO: Cranial nerves grossly intact.  Mentation and speech appropriate for age.  PSYCH: Mentation appears normal, affect normal/bright, judgement and insight intact, normal speech and appearance well-groomed.                Video-Visit Details    Type of service:  Video Visit   Video Start Time:  1000  Video End Time: 1020    Originating Location (pt. Location): Home    Distant Location (provider location):  On-site  Platform used for Video Visit: [employer]

## 2023-09-11 RX ORDER — BUSPIRONE HYDROCHLORIDE 5 MG/1
5 TABLET ORAL 2 TIMES DAILY
Qty: 180 TABLET | Refills: 0 | Status: SHIPPED | OUTPATIENT
Start: 2023-09-11 | End: 2023-09-11

## 2023-09-11 RX ORDER — BUSPIRONE HYDROCHLORIDE 10 MG/1
10 TABLET ORAL 2 TIMES DAILY
Qty: 180 TABLET | Refills: 0 | Status: SHIPPED | OUTPATIENT
Start: 2023-09-11 | End: 2023-12-18

## 2023-09-12 ENCOUNTER — TELEPHONE (OUTPATIENT)
Dept: OPTOMETRY | Facility: CLINIC | Age: 33
End: 2023-09-12

## 2023-09-13 ENCOUNTER — OFFICE VISIT (OUTPATIENT)
Dept: OPTOMETRY | Facility: CLINIC | Age: 33
End: 2023-09-13
Payer: COMMERCIAL

## 2023-09-13 ENCOUNTER — TELEPHONE (OUTPATIENT)
Dept: OPTOMETRY | Facility: CLINIC | Age: 33
End: 2023-09-13

## 2023-09-13 DIAGNOSIS — H52.13 MYOPIA OF BOTH EYES: Primary | ICD-10-CM

## 2023-09-13 ASSESSMENT — VISUAL ACUITY
OS_PH_CC: 20/70
OD_CC: J5-2
OS_CC: J3-2
OS_PH_CC+: +1

## 2023-09-13 ASSESSMENT — REFRACTION_CURRENTRX
OD_BRAND: ACUVUE OASYS
OS_BASECURVE: 8.4
OS_DIAMETER: 14.0
OS_SPHERE: -4.25
OD_DIAMETER: 14.0
OS_BRAND: ACUVUE OASYS
OD_SPHERE: -4.25
OD_BASECURVE: 8.4

## 2023-09-13 NOTE — PROGRESS NOTES
No office visit. CL order only    Contact Lens Billing  V-Code:  - Soft spherical  Final Contact Lens Rx         Brand Base Curve Diameter Sphere    Right Acuvue Oasys 8.4 14.0 -4.25    Left Acuvue Oasys 8.4 14.0 -4.25           Fait order mailed direct: [id number]   # of units: 2 12-packs  Price per Unit: $80 per 12-pack + $24 expedited shipping = $184 total    These are for cosmetic contact lenses.    Encounter Diagnosis   Name Primary?    Myopia of both eyes Yes        Date of last eye exam: 2/25/22

## 2023-09-21 PROCEDURE — G0452 MOLECULAR PATHOLOGY INTERPR: HCPCS | Mod: 26 | Performed by: PATHOLOGY

## 2023-10-09 ENCOUNTER — TRANSFERRED RECORDS (OUTPATIENT)
Dept: FAMILY MEDICINE | Facility: CLINIC | Age: 33
End: 2023-10-09

## 2023-10-09 ENCOUNTER — VIRTUAL VISIT (OUTPATIENT)
Dept: NEUROLOGY | Facility: CLINIC | Age: 33
End: 2023-10-09
Payer: COMMERCIAL

## 2023-10-09 DIAGNOSIS — R27.0 ATAXIA: Primary | ICD-10-CM

## 2023-10-09 PROCEDURE — 99207 PR STATISTIC GENETIC COUNSELING, <16 MIN: CPT | Mod: 95 | Performed by: GENETIC COUNSELOR, MS

## 2023-10-09 ASSESSMENT — PAIN SCALES - GENERAL: PAINLEVEL: NO PAIN (0)

## 2023-10-09 NOTE — NURSING NOTE
Is the patient currently in the state of [place]? YES    Visit mode:VIDEO    If the visit is dropped, the patient can be reconnected by: VIDEO VISIT: Text to cell phone:   Telephone Information:   Mobile [phone]       Will anyone else be joining the visit? NO  (If patient encounters technical issues they should call [phone])    How would you like to obtain your AVS? MyChart    Are changes needed to the allergy or medication list? Pt stated no med changes    Reason for visit: Consult    Asked Pt PHQ-2 questions-  pt states that she is starting an outpatient mental health program through [facility].  Pt does not have any thoughts of SI.       Dorcas ASHRAFF

## 2023-10-09 NOTE — PROGRESS NOTES
"Virtual Visit Details    Type of service:  Video Visit   Video Start Time:  1:25 PM  Video End Time: 1:35 PM    Originating Location (pt. Location): Home    Distant Location (provider location):  On-site  Platform used for Video Visit: [facility]    GENETIC COUNSELING-ataxia clinic  I met with Rosemary \"Pari\" for 10 minutes by video visit to review results of whole exome sequencing.  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  We discussed the following points:  The testing did NOT identify a clear genetic diagnosis for Pari.  On one hand, this can be frustrating when testing does not identify a clear answer, on the other hand, I emphasized that many of the diagnoses that could have been found have a poor prognosis or would place her children at a significant genetic risk. Therefore, the good news from the testing is that we did not identity a clear genetic diagnosis that would be of concern for her or her children.  I explained that the testing did identify two different 'variants of uncertain clinical significance\" (below). I stressed that these types of variants are not considered diagnostic findings. Instead, they are rare variants that do not have sufficient evidence to allow for a definitive classification. I explained that the majority of such variants are eventually classified as benign, but in some cases, we may come to realize that they are clinically significant. I also explained that when we determine a variant is 'rare' we are typically comparing to control databases that do not represent the full genetic diversity of many world populations. Specifically, the genetic diversity of individuals with native [place] ancestry may not be well represented in such databases.  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 I explained that variants in this gene are associated with a condition called \"SCA5.\"  There " are some features of this condition that would overlap with Pari's presentation including an ataxia that is fairly young onset and only mildly progressive if not static.  This finding would not explain the schizencephaly.    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 Mutations in this gene are associated with autosomal dominant hereditary spastic paraplegia type 30. Again, there are some clinical features of this condition that could overlap with Pari's presentation- but there is not sufficient evidence available to definitively classify this variant.    We discussed the fact that it may take time to have enough data to classify either of these variants- and so I encouraged her to continue to check in. In the meantime, neither of these variant represents a diagnosis for her. Rather, they are two 'variants of interest' that require additional information in order to determine whether they are clinically significant, or not.    Pari expressed understanding of this information.    Jermaine Iyer MS, [facility] [place]  Certified Genetic Counselor

## 2023-10-09 NOTE — LETTER
"10/9/2023       RE: Rosemary Davis  [address] [id number]       Dear Colleague,    Thank you for referring your patient, Rosemary Davis, to the [facility] [place] [facility]. Please see a copy of my visit note below.      GENETIC COUNSELING-ataxia clinic  I met with Rosemary \"Pari\" for 10 minutes by video visit to review results of whole exome sequencing.  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  We discussed the following points:  The testing did NOT identify a clear genetic diagnosis for Pari.  On one hand, this can be frustrating when testing does not identify a clear answer, on the other hand, I emphasized that many of the diagnoses that could have been found have a poor prognosis or would place her children at a significant genetic risk. Therefore, the good news from the testing is that we did not identity a clear genetic diagnosis that would be of concern for her or her children.  I explained that the testing did identify two different 'variants of uncertain clinical significance\" (below). I stressed that these types of variants are not considered diagnostic findings. Instead, they are rare variants that do not have sufficient evidence to allow for a definitive classification. I explained that the majority of such variants are eventually classified as benign, but in some cases, we may come to realize that they are clinically significant. I also explained that when we determine a variant is 'rare' we are typically comparing to control databases that do not represent the full genetic diversity of many world populations. Specifically, the genetic diversity of individuals with native [place] ancestry may not be well represented in such databases.  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 I " "explained that variants in this gene are associated with a condition called \"SCA5.\"  There are some features of this condition that would overlap with Pari's presentation including an ataxia that is fairly young onset and only mildly progressive if not static.  This finding would not explain the schizencephaly.    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 Mutations in this gene are associated with autosomal dominant hereditary spastic paraplegia type 30. Again, there are some clinical features of this condition that could overlap with Pari's presentation- but there is not sufficient evidence available to definitively classify this variant.    We discussed the fact that it may take time to have enough data to classify either of these variants- and so I encouraged her to continue to check in. In the meantime, neither of these variant represents a diagnosis for her. Rather, they are two 'variants of interest' that require additional information in order to determine whether they are clinically significant, or not.    Pari expressed understanding of this information.        Again, thank you for allowing me to participate in the care of your patient.      Sincerely,    Oscar Iyer GC    "

## 2023-12-11 NOTE — PROGRESS NOTES
" SUBJECTIVE:   CC: Rosemary Davis is an 33 year old woman who presents for preventive health visit.     Last Pap: 12/15/2021 - Dr West - OB-Gyn for paps  Ataxia: currently managed by neurology - recent genetic testing was negative   KARMEN/Depression: has had one admission this past year for mental health concerns - she and her  are currently going through marriage counseling, patient  found patient's online dating account and relationship with another male online. Patient has 2 children and does want to remain involved with children and is attending marriage counseling.     Overall feels she is doing well, has RUQ abdominal pain x1 week, 'feel it stretching with deep breath' feels more superficial in nature, no new injury. Uses walking stick on right side for stability due to ataxia. No new nausea/vomiting/gas/bloating/diarrhea/constipation. No history of abdominal surgery. Constantly there, mild dull ache       Answers submitted by the patient for this visit:  Annual Preventive Visit (Submitted on 12/16/2023)  Chief Complaint: Annual Exam:  In general, how would you rate your overall physical health?: fair  Frequency of exercise:: 1 day/week  Do you usually eat at least 4 servings of fruit and vegetables a day, include whole grains & fiber, and avoid regularly eating high fat or \"junk\" foods? : No  Taking medications regularly:: Yes  Medication side effects:: None  Activities of Daily Living: transportation requires assistance, no assistance needed  Home safety: no safety concerns identified  Hearing Impairment:: no hearing concerns  In the past 6 months, have you been bothered by leaking of urine?: No  abdominal pain: Yes  Blood in stool: No  Blood in urine: No  chest pain: No  chills: No  congestion: No  constipation: No  cough: No  diarrhea: No  dizziness: No  ear pain: No  eye pain: No  nervous/anxious: No  fever: No  frequency: No  genital sores: No  headaches: No  hearing loss: " No  heartburn: No  arthralgias: No  joint swelling: No  peripheral edema: No  mood changes: No  myalgias: No  nausea: No  dysuria: No  palpitations: No  Skin sensation changes: No  sore throat: No  urgency: No  rash: No  shortness of breath: No  visual disturbance: No  weakness: No  pelvic pain: No  vaginal bleeding: No  vaginal discharge: No  tenderness: No  breast mass: No  breast discharge: No  In general, how would you rate your overall mental or emotional health?: fair  Additional concerns today:: No  Exercise outside of work (Submitted on 12/16/2023)  Chief Complaint: Annual Exam:  Duration of exercise:: 45-60 minutes    Patient has been advised of split billing requirements and indicates understanding: Yes  Healthy Habits:  Have you had an eye exam in the past two years? Yes   Do you see a dentist twice per year? Yes   What is your current smoking status? Never  Do you use smokeless tobacco? Denies  Abuse: Current or Past(Physical, Sexual or Emotional)- No      Today's PHQ-2 Score:       10/9/2023     3:18 PM 7/22/2022    10:58 AM   PHQ-2 ( 1999 [employer])   Q1: Little interest or pleasure in doing things 1 0   Q2: Feeling down, depressed or hopeless 3 0   PHQ-2 Score 4 0       Abuse: Current or Past(Physical, Sexual or Emotional)- No  Do you feel safe in your environment? Yes    Have you ever done Advance Care Planning? (For example, a Health Directive, POLST, or a discussion with a medical provider or your loved ones about your wishes): No, advance care planning information given to patient to review.  Patient plans to discuss their wishes with loved ones or provider.      Social History     Tobacco Use    Smoking status: Never    Smokeless tobacco: Never   Substance Use Topics    Alcohol use: Not Currently     If you drink alcohol do you typically have >3 drinks per day or >7 drinks per week? No                     Reviewed orders with patient.  Reviewed health maintenance and updated orders accordingly -  Yes  Lab work is in process    FHS-7:        No data to display                Patient under 40 years of age: Routine Mammogram Screening not recommended.   Pertinent mammograms are reviewed under the imaging tab.    Pertinent mammograms are reviewed under the imaging tab.  History of abnormal Pap smear: NO - age 30-65 PAP every 5 years with negative HPV co-testing recommended      2021     9:52 AM   PAP / HPV   PAP-ABSTRACT See Scanned Document           This result is from an external source.     Reviewed and updated as needed this visit by clinical staff                  Reviewed and updated as needed this visit by Provider                 Past Medical History:   Diagnosis Date    Ataxia     Depressive disorder     Humerus fracture initial injury  2012    ORIF left humerus in 2012    Missed          ROS:  CONSTITUTIONAL: NEGATIVE for fever, chills, change in weight  INTEGUMENTARU/SKIN: NEGATIVE for worrisome rashes, moles or lesions  EYES: NEGATIVE for vision changes or irritation  ENT: NEGATIVE for ear, mouth and throat problems  RESP: NEGATIVE for significant cough or SOB  BREAST: NEGATIVE for masses, tenderness or discharge  CV: NEGATIVE for chest pain, palpitations or peripheral edema  GI: NEGATIVE for nausea, abdominal pain, heartburn, or change in bowel habits  : NEGATIVE for unusual urinary or vaginal symptoms. Periods are regular.  MUSCULOSKELETAL: NEGATIVE for significant arthralgias or myalgia  NEURO: NEGATIVE for weakness, dizziness or paresthesias  PSYCHIATRIC: NEGATIVE for changes in mood or affect    OBJECTIVE:   There were no vitals taken for this visit.  EXAM:  GENERAL: healthy, alert and no distress  EYES: Eyes grossly normal to inspection, PERRL and conjunctivae and sclerae normal  HENT: ear canals and TM's normal, nose and mouth without ulcers or lesions  NECK: no adenopathy, no asymmetry, masses, or scars and thyroid normal to palpation  RESP: lungs clear to  auscultation - no rales, rhonchi or wheezes  BREAST: normal without masses, tenderness or nipple discharge and no palpable axillary masses or adenopathy  CV: regular rate and rhythm, normal S1 S2, no S3 or S4, no murmur, click or rub, no peripheral edema and peripheral pulses strong  ABDOMEN: soft, nontender, no hepatosplenomegaly, no masses and bowel sounds normal  MS: no gross musculoskeletal defects noted, no edema  SKIN: no suspicious lesions or rashes  NEURO: Normal strength and tone, mentation intact and speech normal  PSYCH: mentation appears normal, affect normal/bright    Diagnostic Test Results:  Labs reviewed in Epic  Results for orders placed or performed in visit on 12/18/23 (from the past 24 hour(s))   Lipid Profile ([facility])   Result Value Ref Range    Cholesterol 192 100 - 199 mg/dL    HDL 27 (A) 40 mg/dL    Triglycerides 185 (A) 0 - 149 mg/dL    LDL CALCULATED ([facility]) 128 0 - 130 mg/dL    Patient Fasting? Yes        ASSESSMENT/PLAN:   Pari was seen today for abdominal pain and physical.    Diagnoses and all orders for this visit:    Routine general medical examination at a health care facility    Generalized anxiety disorder  -     citalopram (CELEXA) 40 MG tablet; Take 1 tablet (40 mg) by mouth daily  -     busPIRone (BUSPAR) 10 MG tablet; Take 1 tablet (10 mg) by mouth 2 times daily    Ataxia  -managed by neurology, feels stable with her symptoms    Screening for endocrine, metabolic and immunity disorder  -     Comprehensive metabolic panel; Future  -     VENOUS COLLECTION  -     Comprehensive metabolic panel    Encounter for lipid screening for cardiovascular disease  -     Lipid Profile ([facility])  -     VENOUS COLLECTION  - 6 years ago patient had mixed hyperlipidemia     Need for vaccination  -     VACCINE ADMINISTRATION, INITIAL  -     [facility] ADMIN COVID VACCINE, IM    Major depressive disorder, recurrent episode, moderate (H)  -     busPIRone (BUSPAR) 10 MG tablet; Take 1 tablet (10 mg) by mouth 2 times  daily  - Feels stable on current regimen, declines medication changes today     Upper respiratory tract infection, unspecified type  -     guaiFENesin (MUCINEX) 600 MG 12 hr tablet; Take 2 tablets (1,200 mg) by mouth 2 times daily for 90 doses  -     fluticasone (FLONASE) 50 MCG/ACT nasal spray; Spray 2 sprays into both nostrils daily for 60 days  - Suspect viral, Education about adverse effects of prescription medication discussed  - Red flags that warrant emergent evaluation discussed  - Patient verbalized understanding and is agreeable to the discussed plan of care.    Other orders  -     INFLUENZA,INJ,MDCK,PF,QUAD >6MO(FLUCELVAX)  -     COVID-19 12+ (2023-24) (MODERNA)        Patient has been advised of split billing requirements and indicates understanding: Yes  COUNSELING:   Reviewed preventive health counseling, as reflected in patient instructions       Regular exercise       Healthy diet/nutrition    Estimated body mass index is 37.11 kg/m  as calculated from the following:    Height as of 3/27/23: 1.524 m (5').    Weight as of 3/27/23: 86.2 kg (190 lb).    Weight management plan: Discussed healthy diet and exercise guidelines    She reports that she has never smoked. She has never used smokeless tobacco.      Counseling Resources:  ATP IV Guidelines  Pooled Cohorts Equation Calculator  Breast Cancer Risk Calculator  BRCA-Related Cancer Risk Assessment: FHS-7 Tool  FRAX Risk Assessment  ICSI Preventive Guidelines  Dietary Guidelines for Americans, 2010  USDA's MyPlate  ASA Prophylaxis  Lung CA Screening    AMELIE Celestin CNP  [facility]

## 2023-12-11 NOTE — PATIENT INSTRUCTIONS
Heat and tylenol for right side pain if not improving with this then we should consider ultrasound       Preventive Health Recommendations  Female Ages 26 - 39  Yearly exam:   See your health care provider every year in order to  Review health changes.   Discuss preventive care.    Review your medicines if you your doctor has prescribed any.    Until age 30: Get a Pap test every three years (more often if you have had an abnormal result).    After age 30: Talk to your doctor about whether you should have a Pap test every 3 years or have a Pap test with HPV screening every 5 years.   You do not need a Pap test if your uterus was removed (hysterectomy) and you have not had cancer.  You should be tested each year for STDs (sexually transmitted diseases), if you're at risk.   Talk to your provider about how often to have your cholesterol checked.  If you are at risk for diabetes, you should have a diabetes test (fasting glucose).  Shots: Get a flu shot each year. Get a tetanus shot every 10 years.   Nutrition:   Eat at least 5 servings of fruits and vegetables each day.  Eat whole-grain bread, whole-wheat pasta and brown rice instead of white grains and rice.  Get adequate Calcium and Vitamin D.     Lifestyle  Exercise at least 150 minutes a week (30 minutes a day, 5 days of the week). This will help you control your weight and prevent disease.  Limit alcohol to one drink per day.  No smoking.   Wear sunscreen to prevent skin cancer.  See your dentist every six months for an exam and cleaning.

## 2023-12-15 ENCOUNTER — TELEPHONE (OUTPATIENT)
Dept: NEUROLOGY | Facility: CLINIC | Age: 33
End: 2023-12-15
Payer: COMMERCIAL

## 2023-12-15 NOTE — TELEPHONE ENCOUNTER
I spoke with the pt, informed her that NP order was just placed today by their referring provider, I explained that it has to go through a process before it can be scheduled. Once it has completed the process, we will reach out to sched an appt.    12/15/23 BD

## 2023-12-16 ASSESSMENT — ENCOUNTER SYMPTOMS
NERVOUS/ANXIOUS: 0
NAUSEA: 0
ABDOMINAL PAIN: 1
JOINT SWELLING: 0
FEVER: 0
ARTHRALGIAS: 0
SORE THROAT: 0
EYE PAIN: 0
HEMATURIA: 0
HEADACHES: 0
BREAST MASS: 0
DIARRHEA: 0
CONSTIPATION: 0
PARESTHESIAS: 0
CHILLS: 0
WEAKNESS: 0
SHORTNESS OF BREATH: 0
MYALGIAS: 0
FREQUENCY: 0
DIZZINESS: 0
COUGH: 0
HEARTBURN: 0
HEMATOCHEZIA: 0
PALPITATIONS: 0
DYSURIA: 0

## 2023-12-16 ASSESSMENT — ACTIVITIES OF DAILY LIVING (ADL)
CURRENT_FUNCTION: TRANSPORTATION REQUIRES ASSISTANCE
CURRENT_FUNCTION: NO ASSISTANCE NEEDED

## 2023-12-18 ENCOUNTER — OFFICE VISIT (OUTPATIENT)
Dept: FAMILY MEDICINE | Facility: CLINIC | Age: 33
End: 2023-12-18

## 2023-12-18 VITALS
DIASTOLIC BLOOD PRESSURE: 55 MMHG | SYSTOLIC BLOOD PRESSURE: 122 MMHG | WEIGHT: 197 LBS | BODY MASS INDEX: 37.19 KG/M2 | OXYGEN SATURATION: 97 % | HEIGHT: 61 IN | HEART RATE: 92 BPM

## 2023-12-18 DIAGNOSIS — F41.1 GENERALIZED ANXIETY DISORDER: ICD-10-CM

## 2023-12-18 DIAGNOSIS — J06.9 UPPER RESPIRATORY TRACT INFECTION, UNSPECIFIED TYPE: ICD-10-CM

## 2023-12-18 DIAGNOSIS — Z13.6 ENCOUNTER FOR LIPID SCREENING FOR CARDIOVASCULAR DISEASE: ICD-10-CM

## 2023-12-18 DIAGNOSIS — Z13.29 SCREENING FOR ENDOCRINE, METABOLIC AND IMMUNITY DISORDER: ICD-10-CM

## 2023-12-18 DIAGNOSIS — Z00.00 ROUTINE GENERAL MEDICAL EXAMINATION AT A HEALTH CARE FACILITY: Primary | ICD-10-CM

## 2023-12-18 DIAGNOSIS — Z13.220 ENCOUNTER FOR LIPID SCREENING FOR CARDIOVASCULAR DISEASE: ICD-10-CM

## 2023-12-18 DIAGNOSIS — F33.1 MAJOR DEPRESSIVE DISORDER, RECURRENT EPISODE, MODERATE (H): ICD-10-CM

## 2023-12-18 DIAGNOSIS — R27.0 ATAXIA: ICD-10-CM

## 2023-12-18 DIAGNOSIS — Z13.0 SCREENING FOR ENDOCRINE, METABOLIC AND IMMUNITY DISORDER: ICD-10-CM

## 2023-12-18 DIAGNOSIS — Z23 NEED FOR VACCINATION: ICD-10-CM

## 2023-12-18 DIAGNOSIS — Z13.228 SCREENING FOR ENDOCRINE, METABOLIC AND IMMUNITY DISORDER: ICD-10-CM

## 2023-12-18 LAB
ALBUMIN SERPL BCG-MCNC: 4.3 G/DL (ref 3.5–5.2)
ALP SERPL-CCNC: 84 U/L (ref 40–150)
ALT SERPL W P-5'-P-CCNC: 33 U/L (ref 0–50)
ANION GAP SERPL CALCULATED.3IONS-SCNC: 9 MMOL/L (ref 7–15)
AST SERPL W P-5'-P-CCNC: 22 U/L (ref 0–45)
BILIRUB SERPL-MCNC: 0.5 MG/DL
BUN SERPL-MCNC: 8.6 MG/DL (ref 6–20)
CALCIUM SERPL-MCNC: 9 MG/DL (ref 8.6–10)
CHLORIDE SERPL-SCNC: 101 MMOL/L (ref 98–107)
CHOLESTEROL: 192 MG/DL (ref 100–199)
CREAT SERPL-MCNC: 0.65 MG/DL (ref 0.51–0.95)
DEPRECATED HCO3 PLAS-SCNC: 27 MMOL/L (ref 22–29)
EGFRCR SERPLBLD CKD-EPI 2021: >90 ML/MIN/1.73M2
FASTING?: YES
GLUCOSE SERPL-MCNC: 96 MG/DL (ref 70–99)
HDL (RMG): 27 MG/DL (ref 40–?)
LDL CALCULATED (RMG): 128 MG/DL (ref 0–130)
POTASSIUM SERPL-SCNC: 3.9 MMOL/L (ref 3.4–5.3)
PROT SERPL-MCNC: 7.5 G/DL (ref 6.4–8.3)
SODIUM SERPL-SCNC: 137 MMOL/L (ref 135–145)
TRIGLYCERIDES (RMG): 185 MG/DL (ref 0–149)

## 2023-12-18 PROCEDURE — 90480 ADMN SARSCOV2 VAC 1/ONLY CMP: CPT | Mod: 59

## 2023-12-18 PROCEDURE — 36415 COLL VENOUS BLD VENIPUNCTURE: CPT

## 2023-12-18 PROCEDURE — 80061 LIPID PANEL: CPT | Mod: QW

## 2023-12-18 PROCEDURE — 80053 COMPREHEN METABOLIC PANEL: CPT

## 2023-12-18 PROCEDURE — 91322 SARSCOV2 VAC 50 MCG/0.5ML IM: CPT

## 2023-12-18 PROCEDURE — 99395 PREV VISIT EST AGE 18-39: CPT | Mod: 25

## 2023-12-18 PROCEDURE — 90674 CCIIV4 VAC NO PRSV 0.5 ML IM: CPT

## 2023-12-18 PROCEDURE — 90471 IMMUNIZATION ADMIN: CPT | Mod: 59

## 2023-12-18 RX ORDER — BUSPIRONE HYDROCHLORIDE 10 MG/1
10 TABLET ORAL 2 TIMES DAILY
Qty: 180 TABLET | Refills: 0 | Status: SHIPPED | OUTPATIENT
Start: 2023-12-18 | End: 2024-05-05

## 2023-12-18 RX ORDER — CITALOPRAM HYDROBROMIDE 40 MG/1
40 TABLET ORAL DAILY
Qty: 90 TABLET | Refills: 3 | Status: SHIPPED | OUTPATIENT
Start: 2023-12-18

## 2023-12-18 RX ORDER — FLUTICASONE PROPIONATE 50 MCG
2 SPRAY, SUSPENSION (ML) NASAL DAILY
Qty: 16 G | Refills: 0 | Status: SHIPPED | OUTPATIENT
Start: 2023-12-18 | End: 2024-02-16

## 2023-12-18 RX ORDER — TACROLIMUS 1 MG/G
OINTMENT TOPICAL 2 TIMES DAILY
COMMUNITY
Start: 2023-09-28

## 2023-12-18 RX ORDER — GUAIFENESIN 600 MG/1
1200 TABLET, EXTENDED RELEASE ORAL 2 TIMES DAILY
Qty: 180 TABLET | Refills: 0 | Status: SHIPPED | OUTPATIENT
Start: 2023-12-18 | End: 2024-02-01

## 2023-12-18 ASSESSMENT — ANXIETY QUESTIONNAIRES
5. BEING SO RESTLESS THAT IT IS HARD TO SIT STILL: NOT AT ALL
7. FEELING AFRAID AS IF SOMETHING AWFUL MIGHT HAPPEN: NOT AT ALL
1. FEELING NERVOUS, ANXIOUS, OR ON EDGE: SEVERAL DAYS
2. NOT BEING ABLE TO STOP OR CONTROL WORRYING: NOT AT ALL
GAD7 TOTAL SCORE: 2
GAD7 TOTAL SCORE: 2
3. WORRYING TOO MUCH ABOUT DIFFERENT THINGS: NOT AT ALL
6. BECOMING EASILY ANNOYED OR IRRITABLE: SEVERAL DAYS
IF YOU CHECKED OFF ANY PROBLEMS ON THIS QUESTIONNAIRE, HOW DIFFICULT HAVE THESE PROBLEMS MADE IT FOR YOU TO DO YOUR WORK, TAKE CARE OF THINGS AT HOME, OR GET ALONG WITH OTHER PEOPLE: SOMEWHAT DIFFICULT

## 2023-12-18 ASSESSMENT — PATIENT HEALTH QUESTIONNAIRE - PHQ9
SUM OF ALL RESPONSES TO PHQ QUESTIONS 1-9: 5
5. POOR APPETITE OR OVEREATING: NOT AT ALL

## 2024-01-23 ENCOUNTER — OFFICE VISIT (OUTPATIENT)
Dept: OPTOMETRY | Facility: CLINIC | Age: 34
End: 2024-01-23
Payer: COMMERCIAL

## 2024-01-23 ENCOUNTER — TELEPHONE (OUTPATIENT)
Dept: OPTOMETRY | Facility: CLINIC | Age: 34
End: 2024-01-23

## 2024-01-23 DIAGNOSIS — H52.13 MYOPIA OF BOTH EYES: Primary | ICD-10-CM

## 2024-01-23 ASSESSMENT — REFRACTION_CURRENTRX
OS_DIAMETER: 14.0
OS_BASECURVE: 8.4
OS_SPHERE: -4.25
OS_BRAND: ACUVUE OASYS
OD_BRAND: ACUVUE OASYS
OD_DIAMETER: 14.0
OD_SPHERE: -4.25
OD_BASECURVE: 8.4

## 2024-01-23 NOTE — PROGRESS NOTES
No office visit. CL order only    Contact Lens Billing  V-Code:  - Soft spherical  Final Contact Lens Rx         Brand Base Curve Diameter Sphere    Right Acuvue Oasys 8.4 14.0 -4.25    Left Acuvue Oasys 8.4 14.0 -4.25           Fait order mailed direct: [phone]  # of units: 2 24-packs  Price per Unit: $130 per 24-pack + $7.95 shipping = $267.95 total    These are for cosmetic contact lenses.    Encounter Diagnosis   Name Primary?    Myopia of both eyes Yes        Date of last eye exam: 2/25/22

## 2024-02-14 NOTE — PROGRESS NOTES
Please refer to ultrasound report under 'Imaging' Studies of 'Chart Review' tabs.    Les Mc M.D.    
380 cc cell saver

## 2024-05-05 DIAGNOSIS — F41.1 GENERALIZED ANXIETY DISORDER: ICD-10-CM

## 2024-05-05 DIAGNOSIS — F33.1 MAJOR DEPRESSIVE DISORDER, RECURRENT EPISODE, MODERATE (H): ICD-10-CM

## 2024-05-05 RX ORDER — BUSPIRONE HYDROCHLORIDE 10 MG/1
10 TABLET ORAL 2 TIMES DAILY
Qty: 180 TABLET | Refills: 0 | Status: SHIPPED | OUTPATIENT
Start: 2024-05-05

## 2024-09-06 ENCOUNTER — TRANSFERRED RECORDS (OUTPATIENT)
Dept: FAMILY MEDICINE | Facility: CLINIC | Age: 34
End: 2024-09-06

## 2024-10-07 ENCOUNTER — TRANSFERRED RECORDS (OUTPATIENT)
Dept: FAMILY MEDICINE | Facility: CLINIC | Age: 34
End: 2024-10-07

## 2024-10-17 NOTE — PROGRESS NOTES
"Department of Neurology  Movement Disorders Division   Ataxia Clinic  Follow-up Note    Patient: Rosemary Davis   MRN: [id number]   : 1990   Date of Visit: 10/18/24     Ms. 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 She was last seen on 23, at which time plan made to pursue whole exome sequencing. Today, she is accompanied by her mother.    In the past, she has been evaluated for reversible causes of ataxia (vitamin B12, folate, vitamin E, alpha-fetoprotein, thyroid globulin Ab, thyroid peroxidase Ab, TTG, KARMEN) which were normal. MRI's in the past have not demonstrated cerebellar atrophy. She has also undergone whole exome sequencing which did not reveal a genetic etiology of ataxia.     INTERVAL EVENTS:  Ms. Davis presents with her mother. Her mother is wondering about Celiac related ataxia.     She feels like her ataxia symptoms are progressing. She had a really bad fall on  and has been doing hand therapy. She fell forward and bruised her right arm extensively. Fortunately there were no fractures but there was a small \"tear\" at her elbow. She was evaluated at urgent care. She feels like she is progressing more quickly than she thought she would. In a typical month she is falling 3-4 times per month (none major). She has worked with physical therapy for balance previously - her last time would have been around a year, year and a half ago. She has been prescribed balance physical to be done after she completes the hand therapy.     They bought a walker after her serious fall for longer distances outside.     Ms. Davis has not noticed changes in her speech. Her mother feels she is a little harder to understand at times.     She denies any dysphagia.     No diplopia.     Her mother reports that her memory is not as good as it used to be. She loses her " phone frequently, forgets where she put her childrens' socks etc. She struggles to remember the schedule whereas she used to be able to remember the week's schedule. They had talked about possibly doing cognitive testing in the past but this was never done.     Sleep has been ok. It hasn't always been the best but hasn't changed. She feels like she wants to sleep a lot more. She does have depression and anxiety and wonders if this is related to her sleep. She does not believe she snores. He has never mentioned apneic pauses in breathing.     She has two children - 5 and 2 years old. They wake her up early.     MEDICATIONS reviewed & pertinent for:   No pertinent medications    ROS:  All others negative except as listed above.    Past Medical History:   Diagnosis Date    Ataxia     Depressive disorder     Humerus fracture initial injury  2012    ORIF left humerus in 2012    Missed          Past Surgical History:   Procedure Laterality Date     SECTION N/A 2018    Procedure:  SECTION;  Surgeon: Geoffrey Trujillo MD;  Location:  [facility]     SECTION N/A 2022    Procedure: REPEAT  SECTION;  Surgeon: Geoffrey Trujillo MD;  Location:  [facility]    DENTAL SURGERY      wisdom extraction     EYE SURGERY      muscle repair for wandering eye--bilat    OPEN REDUCTION INTERNAL FIXATION ELBOW  2012    Procedure...:OPEN REDUCTION INTERNAL FIXATION ELBOW; REPAIR OF NON-UNION, LEFT ELBOW ; Surgeon:BREE NAVARRETE; Location: OR    OPEN REDUCTION INTERNAL FIXATION HUMERUS DISTAL  2012    Procedure:OPEN REDUCTION INTERNAL FIXATION HUMERUS DISTAL; ORIF LEFT DISTAL HUMERUS FRACTURE. SYNTHES ELBOW PLATES, MINI C-ARM; Surgeon:BREE NAVARRETE; Location: OR        Current Outpatient Medications   Medication Sig Dispense Refill    busPIRone (BUSPAR) 10 MG tablet TAKE 1 TABLET BY MOUTH TWICE A  tablet 0    citalopram (CELEXA) 40 MG tablet Take 1 tablet (40 mg) by  mouth daily 90 tablet 3    clobetasol (TEMOVATE) 0.05 % external ointment       levonorgestrel (MIRENA) 52 MG (20 mcg/day) IUD by Intrauterine route once Placed summer 2022      tacrolimus (PROTOPIC) 0.1 % external ointment Apply topically 2 times daily         Allergies   Allergen Reactions    Sulfa Antibiotics Nausea and Vomiting     Cold/shivering/conjunctival injection    Amoxicillin Hives        Family History   Adopted: Yes   Problem Relation Age of Onset    Glaucoma No family hx of     Macular Degeneration No family hx of         Social History     Socioeconomic History    Marital status:      Spouse name: Not on file    Number of children: Not on file    Years of education: Not on file    Highest education level: Not on file   Occupational History    Not on file   Tobacco Use    Smoking status: Never    Smokeless tobacco: Never   Substance and Sexual Activity    Alcohol use: Not Currently    Drug use: No    Sexual activity: Yes   Other Topics Concern    Parent/sibling w/ CABG, MI or angioplasty before 65F 55M? Not Asked   Social History Narrative    Not on file     Social Determinants of Health     Financial Resource Strain: Low Risk  (12/16/2023)    Financial Resource Strain     Within the past 12 months, have you or your family members you live with been unable to get utilities (heat, electricity) when it was really needed?: No   Food Insecurity: Low Risk  (12/16/2023)    Food Insecurity     Within the past 12 months, did you worry that your food would run out before you got money to buy more?: No     Within the past 12 months, did the food you bought just not last and you didn t have money to get more?: No   Transportation Needs: Low Risk  (12/16/2023)    Transportation Needs     Within the past 12 months, has lack of transportation kept you from medical appointments, getting your medicines, non-medical meetings or appointments, work, or from getting things that you need?: No   Physical Activity:  Not on file   Stress: Not on file   Social Connections: Not on file   Interpersonal Safety: Not on file   Housing Stability: Low Risk  (12/16/2023)    Housing Stability     Do you have housing? : Yes     Are you worried about losing your housing?: No        PHYSICAL EXAM:  /76   Pulse 90   Resp 16   SpO2 95%      Gen: alert, active, attentive, appropriately groomed     NEURO:  MS: Alert and oriented to person, place, time, and situation.  Speech normal to comprehension.  Recent and remote memory intact.  Attention and concentration normal.  Fund of knowledge normal.    CN:  Pupils equal, round, and reactive to light. VFF. EOM normal range, direction changing nystagmus.  Hypometric saccades. Facial sensation intact. Face symmetric at rest and with activation. Hearing grossly intact to conversation. Palate rise b/l, uvula midline, no palatal tremor.  Mildly ataxic speech. Trapezius and SCM 5/5 bilaterally. Tongue protrudes midline. No fasciculation or atrophy noted.    Motor:  Normal tone throughout upper and lower extremities. Strength is 5/5 throughout and symmetric.     Reflexes: 2+ throughout and symmetric.    Sensation:  Intact to LT in all extremities.    Coordination:  FTN and HTN with dysmetria bilaterally, mild.    Gait:  Wide based, ataxic gait. Able to stand with feet together for 10 seconds.         ASSESSMENT:  Rosemary Davis is a 30 year old female with with use of tropia, nystagmus status post eye surgery, global developmental delay, schizencephaly and now with a history of ataxia.  Reversible causes of ataxia were excluded with normal vitamin B12, folate, vitamin E, alpha fetoprotein, thyroid globulin Ab, thyroid peroxidase Ab, TTG , KARMEN. She underwent whole exome sequencing which did not reveal an etiology of ataxia. She has noted progression with a recent significant fall. She will doing balance physical therapy after completing hand therapy. Discussed process for obtaining a  wheelchair - if she feels she needs one, we would place a referral for seated and wheeled mobility clinic. We also discussed trying dalfampridine.     PLAN:  - Referral for neuropsychological testing  - Start dalfampridine (ampyra) 10 mg BID, one month trial to start  - Discussed process for prescribing a wheelchair    Ms. Frailing was seen and discussed with attending physician, Dr. Wood.     Estrellita Jefferson MD  Movement Disorders Fellow    Time Spent: 30 minutes spent on the date of the encounter doing chart review, history and exam, documentation and further activities as noted above

## 2024-10-18 ENCOUNTER — OFFICE VISIT (OUTPATIENT)
Dept: NEUROLOGY | Facility: CLINIC | Age: 34
End: 2024-10-18
Payer: COMMERCIAL

## 2024-10-18 VITALS
RESPIRATION RATE: 16 BRPM | HEART RATE: 90 BPM | OXYGEN SATURATION: 95 % | DIASTOLIC BLOOD PRESSURE: 76 MMHG | SYSTOLIC BLOOD PRESSURE: 110 MMHG

## 2024-10-18 DIAGNOSIS — R27.0 ATAXIA: Primary | ICD-10-CM

## 2024-10-18 DIAGNOSIS — R41.3 MEMORY CHANGES: ICD-10-CM

## 2024-10-18 PROCEDURE — 99214 OFFICE O/P EST MOD 30 MIN: CPT | Mod: GC | Performed by: PSYCHIATRY & NEUROLOGY

## 2024-10-18 RX ORDER — DALFAMPRIDINE 10 MG/1
10 TABLET, FILM COATED, EXTENDED RELEASE ORAL 2 TIMES DAILY
Qty: 60 TABLET | Refills: 3 | Status: SHIPPED | OUTPATIENT
Start: 2024-10-18

## 2024-10-18 ASSESSMENT — PAIN SCALES - GENERAL: PAINLEVEL: MILD PAIN (3)

## 2024-10-18 NOTE — LETTER
"10/18/2024       RE: Rosemary Davis  [address] [address]     Dear Colleague,    Thank you for referring your patient, Rosemary Davis, to the [facility] [place] at [facility]. Please see a copy of my visit note below.    Department of Neurology  Movement Disorders Division   Ataxia Clinic  Follow-up Note    Patient: Rosemary Davis   MRN: [id number]   : 1990   Date of Visit: 10/18/24     Ms. 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 She was last seen on 23, at which time plan made to pursue whole exome sequencing. Today, she is accompanied by her mother.    In the past, she has been evaluated for reversible causes of ataxia (vitamin B12, folate, vitamin E, alpha-fetoprotein, thyroid globulin Ab, thyroid peroxidase Ab, TTG, KARMEN) which were normal. MRI's in the past have not demonstrated cerebellar atrophy. She has also undergone whole exome sequencing which did not reveal a genetic etiology of ataxia.     INTERVAL EVENTS:  Ms. Davis presents with her mother. Her mother is wondering about Celiac related ataxia.     She feels like her ataxia symptoms are progressing. She had a really bad fall on  and has been doing hand therapy. She fell forward and bruised her right arm extensively. Fortunately there were no fractures but there was a small \"tear\" at her elbow. She was evaluated at urgent care. She feels like she is progressing more quickly than she thought she would. In a typical month she is falling 3-4 times per month (none major). She has worked with physical therapy for balance previously - her last time would have been around a year, year and a half ago. She has been prescribed balance physical to be done after she completes the hand therapy. "     They bought a walker after her serious fall for longer distances outside.     Ms. Davis has not noticed changes in her speech. Her mother feels she is a little harder to understand at times.     She denies any dysphagia.     No diplopia.     Her mother reports that her memory is not as good as it used to be. She loses her phone frequently, forgets where she put her childrens' socks etc. She struggles to remember the schedule whereas she used to be able to remember the week's schedule. They had talked about possibly doing cognitive testing in the past but this was never done.     Sleep has been ok. It hasn't always been the best but hasn't changed. She feels like she wants to sleep a lot more. She does have depression and anxiety and wonders if this is related to her sleep. She does not believe she snores. He has never mentioned apneic pauses in breathing.     She has two children - 5 and 2 years old. They wake her up early.     MEDICATIONS reviewed & pertinent for:   No pertinent medications    ROS:  All others negative except as listed above.    Past Medical History:   Diagnosis Date     Ataxia      Depressive disorder      Humerus fracture initial injury  2012    ORIF left humerus in 2012     Missed          Past Surgical History:   Procedure Laterality Date      SECTION N/A 2018    Procedure:  SECTION;  Surgeon: Geoffrey Trujillo MD;  Location:  [facility]D      SECTION N/A 2022    Procedure: REPEAT  SECTION;  Surgeon: Geoffrey Trujillo MD;  Location:  [facility]D     DENTAL SURGERY      wisdom extraction      EYE SURGERY      muscle repair for wandering eye--bilat     OPEN REDUCTION INTERNAL FIXATION ELBOW  2012    Procedure...:OPEN REDUCTION INTERNAL FIXATION ELBOW; REPAIR OF NON-UNION, LEFT ELBOW ; Surgeon:BREE NAVARRETE; Location: OR     OPEN REDUCTION INTERNAL FIXATION HUMERUS DISTAL  2012    Procedure:OPEN REDUCTION INTERNAL FIXATION  HUMERUS DISTAL; ORIF LEFT DISTAL HUMERUS FRACTURE. SYNTHES ELBOW PLATES, MINI C-ARM; Surgeon:BREE NAVARRETE; Location:[facility] OR        Current Outpatient Medications   Medication Sig Dispense Refill     busPIRone (BUSPAR) 10 MG tablet TAKE 1 TABLET BY MOUTH TWICE A  tablet 0     citalopram (CELEXA) 40 MG tablet Take 1 tablet (40 mg) by mouth daily 90 tablet 3     clobetasol (TEMOVATE) 0.05 % external ointment        levonorgestrel (MIRENA) 52 MG (20 mcg/day) IUD by Intrauterine route once Placed summer 2022       tacrolimus (PROTOPIC) 0.1 % external ointment Apply topically 2 times daily         Allergies   Allergen Reactions     Sulfa Antibiotics Nausea and Vomiting     Cold/shivering/conjunctival injection     Amoxicillin Hives        Family History   Adopted: Yes   Problem Relation Age of Onset     Glaucoma No family hx of      Macular Degeneration No family hx of         Social History     Socioeconomic History     Marital status:      Spouse name: Not on file     Number of children: Not on file     Years of education: Not on file     Highest education level: Not on file   Occupational History     Not on file   Tobacco Use     Smoking status: Never     Smokeless tobacco: Never   Substance and Sexual Activity     Alcohol use: Not Currently     Drug use: No     Sexual activity: Yes   Other Topics Concern     Parent/sibling w/ CABG, MI or angioplasty before 65F 55M? Not Asked   Social History Narrative     Not on file     Social Determinants of Health     Financial Resource Strain: Low Risk  (12/16/2023)    Financial Resource Strain      Within the past 12 months, have you or your family members you live with been unable to get utilities (heat, electricity) when it was really needed?: No   Food Insecurity: Low Risk  (12/16/2023)    Food Insecurity      Within the past 12 months, did you worry that your food would run out before you got money to buy more?: No      Within the past 12 months, did the food  you bought just not last and you didn t have money to get more?: No   Transportation Needs: Low Risk  (12/16/2023)    Transportation Needs      Within the past 12 months, has lack of transportation kept you from medical appointments, getting your medicines, non-medical meetings or appointments, work, or from getting things that you need?: No   Physical Activity: Not on file   Stress: Not on file   Social Connections: Not on file   Interpersonal Safety: Not on file   Housing Stability: Low Risk  (12/16/2023)    Housing Stability      Do you have housing? : Yes      Are you worried about losing your housing?: No        PHYSICAL EXAM:  /76   Pulse 90   Resp 16   SpO2 95%      Gen: alert, active, attentive, appropriately groomed     NEURO:  MS: Alert and oriented to person, place, time, and situation.  Speech normal to comprehension.  Recent and remote memory intact.  Attention and concentration normal.  Fund of knowledge normal.    CN:  Pupils equal, round, and reactive to light. VFF. EOM normal range, direction changing nystagmus.  Hypometric saccades. Facial sensation intact. Face symmetric at rest and with activation. Hearing grossly intact to conversation. Palate rise b/l, uvula midline, no palatal tremor.  Mildly ataxic speech. Trapezius and SCM 5/5 bilaterally. Tongue protrudes midline. No fasciculation or atrophy noted.    Motor:  Normal tone throughout upper and lower extremities. Strength is 5/5 throughout and symmetric.     Reflexes: 2+ throughout and symmetric.    Sensation:  Intact to LT in all extremities.    Coordination:  FTN and HTN with dysmetria bilaterally, mild.    Gait:  Wide based, ataxic gait. Able to stand with feet together for 10 seconds.         ASSESSMENT:  Rosemary Davis is a 30 year old female with with use of tropia, nystagmus status post eye surgery, global developmental delay, schizencephaly and now with a history of ataxia.  Reversible causes of ataxia were excluded  with normal vitamin B12, folate, vitamin E, alpha fetoprotein, thyroid globulin Ab, thyroid peroxidase Ab, TTG , KARMEN. She underwent whole exome sequencing which did not reveal an etiology of ataxia. She has noted progression with a recent significant fall. She will doing balance physical therapy after completing hand therapy. Discussed process for obtaining a wheelchair - if she feels she needs one, we would place a referral for seated and wheeled mobility clinic. We also discussed trying dalfampridine.     PLAN:  - Referral for neuropsychological testing  - Start dalfampridine (ampyra) 10 mg BID, one month trial to start  - Discussed process for prescribing a wheelchair    Ms. Davis was seen and discussed with attending physician, Dr. Wood.     Estrellita Jefferson MD  Movement Disorders Fellow    Time Spent: 30 minutes spent on the date of the encounter doing chart review, history and exam, documentation and further activities as noted above           Attestation signed by Suha Wood MD at 10/25/2024  6:02 PM:  I have personally interviewed and examined Ms. Rosemary Davis and agree with diagnosis and management. The total time spent with the patient was 30 minutes, over 50% of the time spent in counseling and coordinating care.      Again, thank you for allowing me to participate in the care of your patient.      Sincerely,    Suha Wood MD

## 2024-10-18 NOTE — PATIENT INSTRUCTIONS
We have ordered neuropsychological testing for detailed cognitive testing. You will be contacted to schedule this.     We can try a medication called Dalfampridine (Ampyra). This is typically used for multiple sclerosis but some folks with ataxia find it helps with their balance. You can try this for one month and then reach out to let us know if it is helping. If it is helping we can order refills to continue taking it.     Start taking 10 mg Dalfampridine (Ampyra) twice a day.     If you feel you need a wheelchair, please reach out and we can place a referral for seated and wheeled mobility clinic. A physical therapist will evaluate you and give a recommendation for the likely best option. We can then place a prescription for this.

## 2024-11-07 ENCOUNTER — TELEPHONE (OUTPATIENT)
Dept: OPTOMETRY | Facility: CLINIC | Age: 34
End: 2024-11-07

## 2024-11-07 NOTE — TELEPHONE ENCOUNTER
Spoke with patient regarding request for copy of her contact lens prescription.     While we can absolutely provide a copy of her most recent contact lens prescription- it is  as of 2024.     Pt demonstrated verbal understanding that she will need to have an eye exam before she is able to order contact lenses.     Scheduling number provided and she will schedule an appt at her convenience.       JOSH Leonardo on 2024 at 11:48 AM

## 2024-11-10 DIAGNOSIS — F41.1 GENERALIZED ANXIETY DISORDER: ICD-10-CM

## 2024-11-10 DIAGNOSIS — F33.1 MAJOR DEPRESSIVE DISORDER, RECURRENT EPISODE, MODERATE (H): ICD-10-CM

## 2024-11-10 RX ORDER — BUSPIRONE HYDROCHLORIDE 10 MG/1
10 TABLET ORAL 2 TIMES DAILY
Qty: 180 TABLET | Refills: 0 | Status: SHIPPED | OUTPATIENT
Start: 2024-11-10

## 2024-11-25 ENCOUNTER — OFFICE VISIT (OUTPATIENT)
Dept: OPTOMETRY | Facility: CLINIC | Age: 34
End: 2024-11-25
Payer: COMMERCIAL

## 2024-11-25 DIAGNOSIS — Q07.8 CONGENITAL ANOMALY OF BOTH OPTIC NERVES (H): ICD-10-CM

## 2024-11-25 DIAGNOSIS — H52.13 MYOPIA OF BOTH EYES: Primary | ICD-10-CM

## 2024-11-25 PROBLEM — F33.1 MDD (MAJOR DEPRESSIVE DISORDER), RECURRENT EPISODE, MODERATE (H): Status: ACTIVE | Noted: 2023-10-04

## 2024-11-25 ASSESSMENT — REFRACTION_CURRENTRX
OS_SPHERE: -4.25
OS_BRAND: ACUVUE OASYS
OS_BASECURVE: 8.4
OD_DIAMETER: 14.0
OD_SPHERE: -4.25
OS_DIAMETER: 14.0
OD_BASECURVE: 8.4
OD_BRAND: ACUVUE OASYS

## 2024-11-25 ASSESSMENT — CONF VISUAL FIELD
OD_NORMAL: 1
OS_SUPERIOR_NASAL_RESTRICTION: 0
OS_INFERIOR_TEMPORAL_RESTRICTION: 0
OD_SUPERIOR_TEMPORAL_RESTRICTION: 0
METHOD: COUNTING FINGERS
OS_SUPERIOR_TEMPORAL_RESTRICTION: 0
OD_INFERIOR_NASAL_RESTRICTION: 0
OS_INFERIOR_NASAL_RESTRICTION: 0
OD_SUPERIOR_NASAL_RESTRICTION: 0
OS_NORMAL: 1
OD_INFERIOR_TEMPORAL_RESTRICTION: 0

## 2024-11-25 ASSESSMENT — REFRACTION
OS_SPHERE: -4.75
OS_SPHERE: -5.00
OS_CYLINDER: +0.50
OD_SPHERE: -4.25
OS_AXIS: 075
OD_SPHERE: -4.50
OD_AXIS: 022
OD_CYLINDER: +0.25

## 2024-11-25 ASSESSMENT — SLIT LAMP EXAM - LIDS
COMMENTS: NORMAL
COMMENTS: NORMAL

## 2024-11-25 ASSESSMENT — REFRACTION_MANIFEST
OD_SPHERE: -3.00
OS_CYLINDER: +0.50
OS_SPHERE: -4.00
OD_CYLINDER: SPHERE
OS_AXIS: 180

## 2024-11-25 ASSESSMENT — REFRACTION_WEARINGRX
OD_SPHERE: -4.75
OS_CYLINDER: SPHERE
OD_CYLINDER: SPHERE
OS_SPHERE: -4.25

## 2024-11-25 ASSESSMENT — VISUAL ACUITY
CORRECTION_TYPE: GLASSES
OS_CC: 20/70
OD_CC: 20/80
OS_CC+: -2
OD_CC+: -1
METHOD: SNELLEN - LINEAR

## 2024-11-25 ASSESSMENT — TONOMETRY
OD_IOP_MMHG: 21
OS_IOP_MMHG: 20
IOP_METHOD: ICARE

## 2024-11-25 ASSESSMENT — EXTERNAL EXAM - RIGHT EYE: OD_EXAM: NORMAL

## 2024-11-25 ASSESSMENT — CUP TO DISC RATIO
OS_RATIO: 0.35
OD_RATIO: 0.35

## 2024-11-25 ASSESSMENT — PATIENT HEALTH QUESTIONNAIRE - PHQ9: SUM OF ALL RESPONSES TO PHQ QUESTIONS 1-9: 5

## 2024-11-25 ASSESSMENT — EXTERNAL EXAM - LEFT EYE: OS_EXAM: NORMAL

## 2024-11-25 NOTE — PROGRESS NOTES
A/P  1.) Congenital optic nerve anomaly OU  -Longstanding, BCVA 20/50-60 range- right and left eye  -Nystagmus, better controlled in CL's  -No changes on dilated exam. Continue to monitor    2.) Myopia OU  -Wearing glasses and CL's  -Doing well in current CL's, continue same brand  -Prefers same power OU on trial lens over today    Monitor 1-2 years comprehensive, sooner prn    I have confirmed the patient's CC, HPI and reviewed Past Medical History, Past Surgical History, Social History, Family History, Problem List, Medication List and agree with Tech note.     Brandy May, JUANJO FAAO FSLS

## 2024-11-25 NOTE — NURSING NOTE
Chief Complaints and History of Present Illnesses   Patient presents with    Annual Eye Exam     Pt here for routine eye exam with contacts.      Chief Complaint(s) and History of Present Illness(es)       Annual Eye Exam              Laterality: both eyes    Comments: Pt here for routine eye exam with contacts.               Comments    Pt has largely unchanged vision since last exam. Maybe mild changes each eye. No new concerns.     JOSH Leonardo on 11/25/2024 at 10:39 AM

## 2024-11-26 ENCOUNTER — OFFICE VISIT (OUTPATIENT)
Dept: OPTOMETRY | Facility: CLINIC | Age: 34
End: 2024-11-26
Payer: COMMERCIAL

## 2024-11-26 DIAGNOSIS — Q07.8 CONGENITAL ANOMALY OF BOTH OPTIC NERVES (H): ICD-10-CM

## 2024-11-26 DIAGNOSIS — H52.13 MYOPIA OF BOTH EYES: Primary | ICD-10-CM

## 2024-11-26 ASSESSMENT — REFRACTION_CURRENTRX
OD_BRAND: ACUVUE OASYS
OS_BASECURVE: 8.4
OD_BASECURVE: 8.4
OS_DIAMETER: 14.0
OD_DIAMETER: 14.0
OS_SPHERE: -4.00
OD_SPHERE: -4.00
OS_BRAND: ACUVUE OASYS

## 2024-11-26 NOTE — PROGRESS NOTES
No office visit. CL order only     Contact Lens Billing  V-Code:  - Soft spherical     Fait order mailed direct: [id number]  # of units: 2 24-packs  Price per Unit: $130 per 24-pack + $7.95 shipping = $267.95 total     These are for cosmetic contact lenses.          Encounter Diagnosis   Name Primary?    Myopia of both eyes Yes         Date of last eye exam: 11/25/2024     Final Contact Lens Rx     Brand Base Curve Diameter Sphere   Right Acuvue Oasys 8.4 14.0 -4.00   Left Acuvue Oasys 8.4 14.0 -4.00   Expiration Date: 11/25/26   Replacement: Two weeks   Solutions: Multipurpose   Wearing Schedule: Daily Wear     Additional Notes    Auto: +0.50 right +0.25 left sph equiv  Edited by: Brandy May, OD

## 2024-11-27 ENCOUNTER — TRANSFERRED RECORDS (OUTPATIENT)
Dept: FAMILY MEDICINE | Facility: CLINIC | Age: 34
End: 2024-11-27

## 2025-01-30 DIAGNOSIS — F41.1 GENERALIZED ANXIETY DISORDER: ICD-10-CM

## 2025-01-30 RX ORDER — CITALOPRAM HYDROBROMIDE 40 MG/1
40 TABLET ORAL DAILY
Qty: 90 TABLET | Refills: 3 | Status: SHIPPED | OUTPATIENT
Start: 2025-01-30

## 2025-02-08 ENCOUNTER — HEALTH MAINTENANCE LETTER (OUTPATIENT)
Age: 35
End: 2025-02-08

## 2025-02-09 DIAGNOSIS — F33.1 MAJOR DEPRESSIVE DISORDER, RECURRENT EPISODE, MODERATE (H): ICD-10-CM

## 2025-02-09 DIAGNOSIS — F41.1 GENERALIZED ANXIETY DISORDER: ICD-10-CM

## 2025-02-10 RX ORDER — BUSPIRONE HYDROCHLORIDE 10 MG/1
10 TABLET ORAL 2 TIMES DAILY
Qty: 180 TABLET | Refills: 0 | Status: SHIPPED | OUTPATIENT
Start: 2025-02-10

## 2025-03-25 ENCOUNTER — VIRTUAL VISIT (OUTPATIENT)
Dept: NEUROPSYCHOLOGY | Facility: CLINIC | Age: 35
End: 2025-03-25
Attending: STUDENT IN AN ORGANIZED HEALTH CARE EDUCATION/TRAINING PROGRAM
Payer: COMMERCIAL

## 2025-03-25 DIAGNOSIS — R41.3 MEMORY CHANGES: ICD-10-CM

## 2025-03-25 DIAGNOSIS — F41.1 GAD (GENERALIZED ANXIETY DISORDER): ICD-10-CM

## 2025-03-25 DIAGNOSIS — R27.0 ATAXIA: ICD-10-CM

## 2025-03-25 DIAGNOSIS — F33.42 RECURRENT MAJOR DEPRESSIVE DISORDER, IN FULL REMISSION: Primary | ICD-10-CM

## 2025-03-25 PROCEDURE — 99207 PR PSYCHIATRIC DIAGNOSTIC EVALUATION: CPT | Mod: 95

## 2025-03-25 NOTE — PROGRESS NOTES
THIS IS A DRAFT REPORT IN PREPARATION.     ANY NOTES AND ERRORS WILL BE CORRECTED PRIOR TO FINALIZING THIS REPORT (WITHIN 2 WEEKS OF SERVICE DATE).    IF RESULTS ARE NEEDED SOONER, PLEASE CONTACT ME VIA EPIC AS I CAN GENERALLY PROVIDE A VERBAL IMPRESSION WITHIN 1-2 DAYS    CONFIDENTIAL NEUROPSYCHOLOGICAL EVALUATION  **This is a medical document intended to be read within the context of the larger medical chart and for communication with the referring provider.  It is writing in medical language and may contain abbreviations that are unfamiliar.  Please consult the provider for further clarification.**    Name:  Rosemary Davis  (Pari)  YOB: 1990  Date of Assessment: Mar 25, 2025  Referring Provider: Estrellita Jefferson  Occupation: Not currently working  Education:  13 years  Handedness: right handed    Reason for Referral: Rosemary Davis is a 35 year old, female who was referred for a neuropsychological evaluation for cognitive problems. .       SUMMARY AND CLINICAL IMPRESSIONS: See below for relevant background information and detailed test results.  See separate abstract for a list of neuropsychological test scores.     It is recommended that Pari undergo the testing portion of the neuropsychological assessment.  It will be important to focus on intellectual functioning (potentially obtaining an IQ - could help with waiver placement), as well as bilateral motor functioning and insight.  Proposed test list below. Further conceptualization and diagnosis will be deferred until after testing is completed.     RECOMMENDATIONS:  Complete neuropsychological testing.     DIAGNOSES (per chart)  MDD  HAD  Subjective cognitive complaints    Beth Schulz PsyD, PERRY  Clinical Neuropsychologist    RELEVANT BACKGROUND INFORMATION  Informed consent was obtained after discussing the purpose and procedures of the evaluation, as well as aspects of confidentiality and effort/engagement.  Background  "information was obtained from a clinical interview with Pari as well as review of available medical records.     HISTORY OF PRESENTING ILLNESS: Pari is a 35-year-old woman with a PMH is esotropia, nystagmus, global developmental delay and schizencephaly.  She has been followed by neurology for ataxia.  In the past, work-up for reversible forms of ataxia were considered normal, past MRIs have not demonstrated cerebellar atrophy, an d exome sequencing was negative for genetic etiology of ataxia. She has had several falls that including falling forward and extensive bruising her right arm. She falls about 3-4 times a month. She last worked with PT about 1.5 years ago. Her mother reported that she has noticed increased memory challenges, such as forgetting where her phone is, misplacing children's clothing items, and struggling to recall appointments.  Neuropsychological testing has not been completed - prompting this referral.     Current Clinical Interview:    Pari reported that attention and memory are her two big concerns.  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  She noted she can't recall what people tell her, and she has difficulties recalling conversations.  She also misplaces important objects such as her phone or glasses.  She does have two small kids, so it's hard for her to determine if this is \"mom brain\" or due to cognitive changes from other factors. It's harder for her to recall remote events.      Pari reported she is very much a \"homebody\" due to the combination of physical and cognitive concerns.   She noted her ataxia prevents her from navigating the community in ways that she wants to.  She noted she is getting weaker and she is concerned about the progression of her ataxia.  She feels weakness and cramping in her feet, back, and has gained a lot of weight because it's hard " "for her to exercise.      Pari lives at home with her  and two young kids (ages 6 and 3).  She is responsible for most things around the house including cleaning and laundry.  Her  cooks and is responsible for all the finances, but it has always been this arrangement.  She manages her own medications - although she will occasionally forget to take her medications.  She denied any difficulties with a cell phone or computer.  She doesn't drive as she is legally blind - although this also contributes to why she is a homebody. She spends her day cleaning and watching TV.  She noted her physical challenges contribute to feelings of depression and anxiety because it's hard for her to engage in some things such as it's hard for her to care for her children without her  around.     She has participated in PT in the past, when she was pregnant, with some efficacy.  She is not currently involved in PT.      HEALTH HABITS, BEHAVIORS AND MOOD:   Description of current mood:  \"For the most part, I'm hanging out at baseline.\"  She reported low energy and coping with anxiety and depression.  She tries to stay positive.  She does have therapist and has done couples counseling in the past.  She has never had a psychiatric hospitalization.  She has attended an IOP when she was going through some challenges with her  (October 2023).     Appetite: \"okay.\" No changes to taste.  Smell - Nose blind to the pets.      Sleep/Energy: \"Same.\" Varies. She does have difficulties falling asleep due to ruminative thoughts.  Her kids also wake her up at night.  She will nap during the day, or every other day for about  minutes around 2-4pm.  She has never been diagnosed with a DENNYS, but after a recent visit - her sister mentioned that she noted and suspects DENNYS.     Chronic Pain: back pain    Tobacco/Marijuana use:  Denied recent or remote    Alcohol use:  3-4 times a week she consumes 1-3 standard drinks.     Other " "Drugs: Denied.    Hallucinations: Denied hallucinations delusions    Suicidal ideation: Denied SI, HI, NSSI    MEDICAL/PSYCHIATRIC/SURGICAL HISTORY: Denied other history of TBI, seizure, stroke, CNS infection, or CNS surgery  Patient Active Problem List   Diagnosis    Nonunion of fracture    Ataxia    Missed     Humerus fracture    Recurrent major depressive disorder, in full remission    Indication for care in labor or delivery    S/P     Generalized anxiety disorder    MDD (major depressive disorder), recurrent episode, moderate (H)       Past Medical History:   Diagnosis Date    Ataxia     Depressive disorder     Humerus fracture initial injury  2012    ORIF left humerus in 2012    Missed         Past Surgical History:   Procedure Laterality Date     SECTION N/A 2018    Procedure:  SECTION;  Surgeon: Geoffrey Trujillo MD;  Location:  [facility]     SECTION N/A 2022    Procedure: REPEAT  SECTION;  Surgeon: Geoffrey Trujillo MD;  Location:  [facility]    DENTAL SURGERY      wisdom extraction     EYE SURGERY      muscle repair for wandering eye--bilat    OPEN REDUCTION INTERNAL FIXATION ELBOW  2012    Procedure...:OPEN REDUCTION INTERNAL FIXATION ELBOW; REPAIR OF NON-UNION, LEFT ELBOW ; Surgeon:BREE NAVARRETE; Location: OR    OPEN REDUCTION INTERNAL FIXATION HUMERUS DISTAL  2012    Procedure:OPEN REDUCTION INTERNAL FIXATION HUMERUS DISTAL; ORIF LEFT DISTAL HUMERUS FRACTURE. 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  Absence of the ventricular sptum is noted.  Corpus callosum is without gross abnormality.\"    Brain Imaging 2-: \"Right hemisphere schizencephaly\"    MEDICATIONS: Rosemary has a current medication list which includes the following prescription(s): buspirone, citalopram, clobetasol, dalfampridine, levonorgestrel, and tacrolimus..    SOCIAL/DEVELOPMENTAL/OCCUPATIONAL HISTORY:  Pari was born in [facility], and moved to [place] at a young age. She reported she was slow to learn how to walk.  She stated she did \"okay\" in school - she technically met criteria for a learning disability given her difficulties in vision.  As a result, and had an IEP and had extra time for tests, englarged texts.  She was an \"average\" student, earning primarily Bs/Cs.  However, she did report difficulties with learning, particularly for math, even after accounting for vision difficulties.She graduated high school on time.  She took a couple of general education requirements at the college level.  For fun, she likes to be involved in her children's PTA and involved in their school.       BEHAVIORAL OBSERVATIONS: Pari arrived to her virtual appointment on time.  Given the telehealth nature of the appointment, motor functioning wasn't assessed. Speech was slightly dysarthric/effortful, but this didn't not functionally impact communication. Affect was consistent with euthymic mood.  Thoughts were goal-directed and linear.  No SI, HI, umu, or confusion noted.  It was challenging to assess insight, and this will be more completely commented on following the neuropsychological testing portion of the assessment.      Procedures Administered by Psychologist: Clinical Interview with Rosemary , review of available medical records, interpretation, preparation of written report.        Activities included in this evaluation CPT Code Time Units   Psychological Diagnostic Interview 07728 - " 1   Neuropsychology Professional Time 81634     [place] [address]     Neuropsychologist Admin/Scoring Tests [id number]     [place] On [address]68     Psychometrician Time - Test 09998     Admin/Scoring [id number]     DX:               Proposed test list to be complete upon returning for testing:   AL Neuropsych Test List: NAB Orientation and Numbers and Letters, ACS TOPF, Dot Counting Test, WAIS-IV Tests: Digit Span, Symbol Search, and Coding, HVLT-R, BVMT-R, RCFT: Copy and 3' Delay, Line Orientation, Trails A&B, Color-Word Interference, WCST-128, Grooved Pegboard, and WASI-II (4 FSIQ subtest)

## 2025-04-07 ENCOUNTER — MYC REFILL (OUTPATIENT)
Dept: NEUROLOGY | Facility: CLINIC | Age: 35
End: 2025-04-07
Payer: COMMERCIAL

## 2025-04-07 DIAGNOSIS — R41.3 MEMORY CHANGES: ICD-10-CM

## 2025-04-07 DIAGNOSIS — R27.0 ATAXIA: ICD-10-CM

## 2025-04-17 RX ORDER — DALFAMPRIDINE 10 MG/1
10 TABLET, FILM COATED, EXTENDED RELEASE ORAL 2 TIMES DAILY
Qty: 60 TABLET | Refills: 5 | Status: SHIPPED | OUTPATIENT
Start: 2025-04-17

## 2025-04-28 ASSESSMENT — PATIENT HEALTH QUESTIONNAIRE - PHQ9
10. IF YOU CHECKED OFF ANY PROBLEMS, HOW DIFFICULT HAVE THESE PROBLEMS MADE IT FOR YOU TO DO YOUR WORK, TAKE CARE OF THINGS AT HOME, OR GET ALONG WITH OTHER PEOPLE: NOT DIFFICULT AT ALL
SUM OF ALL RESPONSES TO PHQ QUESTIONS 1-9: 4
SUM OF ALL RESPONSES TO PHQ QUESTIONS 1-9: 4

## 2025-04-29 ENCOUNTER — OFFICE VISIT (OUTPATIENT)
Dept: FAMILY MEDICINE | Facility: CLINIC | Age: 35
End: 2025-04-29

## 2025-04-29 VITALS
DIASTOLIC BLOOD PRESSURE: 74 MMHG | WEIGHT: 198.3 LBS | OXYGEN SATURATION: 96 % | BODY MASS INDEX: 37.47 KG/M2 | HEART RATE: 105 BPM | SYSTOLIC BLOOD PRESSURE: 117 MMHG

## 2025-04-29 DIAGNOSIS — G11.8 SPINOCEREBELLAR ATAXIA (H): ICD-10-CM

## 2025-04-29 DIAGNOSIS — E66.812 CLASS 2 OBESITY DUE TO EXCESS CALORIES WITHOUT SERIOUS COMORBIDITY WITH BODY MASS INDEX (BMI) OF 37.0 TO 37.9 IN ADULT: ICD-10-CM

## 2025-04-29 DIAGNOSIS — E66.09 CLASS 2 OBESITY DUE TO EXCESS CALORIES WITHOUT SERIOUS COMORBIDITY WITH BODY MASS INDEX (BMI) OF 37.0 TO 37.9 IN ADULT: ICD-10-CM

## 2025-04-29 DIAGNOSIS — E78.5 DYSLIPIDEMIA: ICD-10-CM

## 2025-04-29 PROCEDURE — G2211 COMPLEX E/M VISIT ADD ON: HCPCS | Performed by: FAMILY MEDICINE

## 2025-04-29 PROCEDURE — 3074F SYST BP LT 130 MM HG: CPT | Performed by: FAMILY MEDICINE

## 2025-04-29 PROCEDURE — 3078F DIAST BP <80 MM HG: CPT | Performed by: FAMILY MEDICINE

## 2025-04-29 PROCEDURE — 99214 OFFICE O/P EST MOD 30 MIN: CPT | Performed by: FAMILY MEDICINE

## 2025-04-29 NOTE — PATIENT INSTRUCTIONS
COST AND COVERAGE:    [facility] compounding pharmacy were no longer allowed to provide compounded semaglutide as of 4/22/2025.  The FDA have advised the trade name product (Wegovy) is no longer in short supply so they have to stop producing it.     Alternatives for those whos insurance does not cover injectable medications for weight loss :    1) - [employer] to obtain trizepatide (the drug in Zepbound) in VIALS, for a lower cost than the trade named PENS without insurance coverage. It's still a lot of money. Up to date costs can be found on this website:    https://[email or web address].Easycause/pharmacy/zepbound    Zepbound PENS : $650/month for 2.5mg-15mg at any pharmacy or [facility] using the savings card.  Zepbound VIALS : $349/month for 2.5mg $499/month for 5mg-10mg    2) - [employer]govy (the makers of semaglutide) have also started offering the wegovy pens for $499 per month through [facility] or your local pharmacy with a savings card for those who's insurance does not cover these medications.     https://www.[employer]/coverage-and-savings/save-on-wegovy.html    3) -Some patients obtain compounded semaglutide through [employer] or [employer] or one of the other on line distributors of the compounded products. I cannot vouch for their standards or the product they are issuing and also question how they are able to continue producing compounded weight loss medications when [facility] has been informed they have to cease.    None the less, [employer] have a great page explaining the costs and coverage of semaglutide (trade name Wegovy).    https://www."[employer]/blog/semaglutide-cost      Let me know how you would like to consider moving forward.

## 2025-04-29 NOTE — PROGRESS NOTES
PRIMARY CARE WEIGHT MANAGEMENT INITIAL NOTE    CHIEF COMPLAINT:  Rosemary Davis is a 35 year old female who is following up for medical weight management.     Always small then when turned 19 started gaining weight. Stopped as much exercise. Later couldn't do as much and diagnosed with Ataxia. Tried to do what she could. Tried everything and with balance issues had trouble with exercising. Tried other programs without success. Best results Ghada with meal replacements. Then got pregnant. Dad is on medication-Wegovy and lost weight. Wants to be healthy and lose weight. Wants more energy back.    Medications:   Was obtaining compounded semaglutide via  compounding pharmacy.   Insurance does not injectables. Ok with switching to Zepbound in vials via [employer] direct.     Nutrition:  Focusing on protein and fiber.  Staying hydrated  Minimal alcohol    Behavior:   Typical day doesn't eat too much. Not breakfast eater then lunch early. Big dinner. Doesn't graze eat like her . Working on drinking more water.     Physical Activity:   Minimal right now. Doing body mind with legs and arms. Hoping when nicer to get outside to walk. Walking will be main source. Difficult to lift weights.     WEIGHT HISTORY:   Wt Readings from Last 3 Encounters:   04/29/25 89.9 kg (198 lb 4.8 oz)   04/04/25 92.9 kg (204 lb 12.8 oz)   03/21/25 91.4 kg (201 lb 9.6 oz)       PHYSICAL EXAM:  VITAL SIGNS:  /74   Pulse 105   Wt 89.9 kg (198 lb 4.8 oz)   SpO2 96%   BMI 37.47 kg/m      Weight at start of treatment: 204 lbs  Weigh at last appointment: 204 lbs  Weight today: 198 lbs  Weight change since last appointment: -6 lbs  Weight change since start of treatment : -6 bs  Goal weight loss over first 6 months: 10% of body weight     General: no acute distress, cooperative with exam.   Psych: mental status normal, mood and affect appropriate.          ASSESSMENT/PLAN:  Rosemary Davis is a 35 year old female who is  following up for medical weight management.    Class 2 obesity due to excess calories without serious comorbidity with body mass index (BMI) of 37.0 to 37.9 in adult    -Nutrition: Focusing on protein with increase in hydration. Following with nutrition: No.   -Exercise: Walking 3x/week   -Medications: Switch from Semaglutide 0.25 mg weekly to Zepbound 2.5 mg weekly via [employer].     -     tirzepatide-Weight Management (ZEPBOUND) 2.5 MG/0.5ML prefilled pen; Inject 0.5 mLs (2.5 mg) subcutaneously every 7 days.    Spinocerebellar ataxia (H)   Contributing to obesity. Difficulty with balance. Taking Dalfampridine. Follows with ataxia center. Currently managed by Neurology. Uses cane for ambulation. Continue current medication regimen and treatment plan.     Dyslipidemia  Not on medication. Will treat in part with MWM. Reviewed lifestyle modifications.        Follow up:  4 weeks mwm     The longitudinal plan of care for the diagnosis(es)/condition(s) as documented were addressed during this visit. Due to the added complexity in care, I will continue to support Pari in the subsequent management and with ongoing continuity of care.

## 2025-05-01 ENCOUNTER — DOCUMENTATION ONLY (OUTPATIENT)
Dept: NEUROPSYCHOLOGY | Facility: CLINIC | Age: 35
End: 2025-05-01
Payer: COMMERCIAL

## 2025-05-01 NOTE — PROGRESS NOTES
The testing appointment is scheduled on 5/5/2025 (date)    Who referred the patient for testing? Estrellita Jefferson  (Neurology) (Referred by and phone number)    Case Background:    Have you completed a psychiatric/psychological diagnostic assessment (DA) with this patient? Yes Where is the patient located? At home    Date DA completed: 3/25/2025 (date)    Is the patient currently hospitalized? No  If yes, is it medically necessary for testing to be done prior to discharge? No  Is this patient in a pre-surgical status? No  Are there currently any safety concerns regarding this patient? No  If yes, what are those concerns? 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    It is recommended that Pari undergo the testing portion of the neuropsychological assessment given her neurological history combined with progression in her challenges noted during the clinical interview. It will be important to focus on intellectual functioning (potentially obtaining an IQ - could help with waiver placement), as well as bilateral motor functioning and insight.  Proposed test list below. Further conceptualization and diagnosis will be deferred until after testing is completed.     Please include a description of clinical symptoms and functional impairment. Pari reported that attention and memory are her two big concerns.  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  She noted she can't recall what  "people tell her, and she has difficulties recalling conversations.  She also misplaces important objects such as her phone or glasses.  She does have two small kids, so it's hard for her to determine if this is \"mom brain\" or due to cognitive changes from other factors. It's harder for her to recall remote events.       Pari reported she is very much a \"homebody\" due to the combination of physical and cognitive concerns.   She noted her ataxia prevents her from navigating the community in ways that she wants to.  She noted she is getting weaker and she is concerned about the progression of her ataxia.  She feels weakness and cramping in her feet, back, and has gained a lot of weight because it's hard for her to exercise.       Pari lives at home with her  and two young kids (ages 6 and 3).  She is responsible for most things around the house including cleaning and laundry.  Her  cooks and is responsible for all the finances, but it has always been this arrangement.  She manages her own medications - although she will occasionally forget to take her medications.  She denied any difficulties with a cell phone or computer.  She doesn't drive as she is legally blind - although this also contributes to why she is a homebody. She spends her day cleaning and watching TV.  She noted her physical challenges contribute to feelings of depression and anxiety because it's hard for her to engage in some things such as it's hard for her to care for her children without her  around.   Was their any testing completed previously? No  If so, on what date N/A    List All Tests Required:   Proposed test list to be complete upon returning for testing:   AL Neuropsych Test List: NAB Orientation and Numbers and Letters, ACS TOPF, Dot Counting Test, WAIS-IV Tests: Digit Span, Symbol Search, and Coding, HVLT-R, BVMT-R, RCFT: Copy and 3' Delay, Line Orientation, Trails A&B, Color-Word Interference, WCST-128, Grooved " Pegboard, and WASI-II (4 FSIQ subtest)        Neuropsychological Testing    49057 (Neurobehavioral Status Exam first 1 hour)- 0  28328 (first hour only by QHP)- 1  07708 (each additional hour by QHP)- 2  36591 (first 30 minutes by QHP--maximum 1 unit)- 0  06611 (each additional 30 minutes by QHP)- 0  96421 (first 30 minutes by Tech--maximum 1 unit)- 1  89875 (each additional 30 minutes by Tech)-7    Psychological Testing    25306 (first hour only by QHP--maximum 1 unit)- 0  16438 (each additional hour by QHP)- 0  89672 (first 30 minutes by QHP--maximum 1 unit)- 0  88554 (each additional 30 minutes by QHP)- 0  43854 (first 30 minutes by Tech--maximum 1 unit)- 0  88253 (each additional 30 minutes by Tech)-0

## 2025-05-05 ENCOUNTER — OFFICE VISIT (OUTPATIENT)
Dept: NEUROPSYCHOLOGY | Facility: CLINIC | Age: 35
End: 2025-05-05
Attending: CLINICAL NEUROPSYCHOLOGIST
Payer: COMMERCIAL

## 2025-05-05 DIAGNOSIS — R41.3 MEMORY CHANGES: ICD-10-CM

## 2025-05-05 DIAGNOSIS — F41.1 GAD (GENERALIZED ANXIETY DISORDER): ICD-10-CM

## 2025-05-05 DIAGNOSIS — Q04.6 SCHIZENCEPHALY (H): ICD-10-CM

## 2025-05-05 DIAGNOSIS — G11.8 SPINOCEREBELLAR ATAXIA (H): ICD-10-CM

## 2025-05-05 DIAGNOSIS — F02.80 MAJOR NEUROCOGNITIVE DISORDER DUE TO MULTIPLE ETIOLOGIES (H): Primary | ICD-10-CM

## 2025-05-05 DIAGNOSIS — F33.42 RECURRENT MAJOR DEPRESSIVE DISORDER, IN FULL REMISSION: ICD-10-CM

## 2025-05-05 PROCEDURE — 96139 PSYCL/NRPSYC TST TECH EA: CPT | Performed by: CLINICAL NEUROPSYCHOLOGIST

## 2025-05-05 PROCEDURE — 96133 NRPSYC TST EVAL PHYS/QHP EA: CPT | Performed by: CLINICAL NEUROPSYCHOLOGIST

## 2025-05-05 PROCEDURE — 96132 NRPSYC TST EVAL PHYS/QHP 1ST: CPT | Performed by: CLINICAL NEUROPSYCHOLOGIST

## 2025-05-05 PROCEDURE — 90791 PSYCH DIAGNOSTIC EVALUATION: CPT | Performed by: CLINICAL NEUROPSYCHOLOGIST

## 2025-05-05 PROCEDURE — 96138 PSYCL/NRPSYC TECH 1ST: CPT | Performed by: CLINICAL NEUROPSYCHOLOGIST

## 2025-05-06 NOTE — PROGRESS NOTES
Pt was seen for neuropsychological evaluation at the request of Dr. Beth Schulz for the purposes of diagnostic clarification and treatment planning. 241 minutes of test administration and scoring were provided by this writer. Please see Dr. Beth Schulz's report for a full interpretation of the findings.    Ronda Ibanez  Psychometrist

## 2025-05-16 NOTE — PROGRESS NOTES
CONFIDENTIAL NEUROPSYCHOLOGICAL EVALUATION  **This is a medical document intended to be read within the context of the larger medical chart and for communication with the referring provider.  It is writing in medical language and may contain abbreviations that are unfamiliar.  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       SUMMARY AND CLINICAL IMPRESSIONS: See below for relevant background information and detailed test results.  See separate abstract for a list of neuropsychological test scores.     The results of the neuropsychological evaluation are abnormal.  Within the context of estimated borderline to average ranges, Pari demonstrated consistent impairments on visuospatial tasks including basic line discrimination, construction and pattern recognition and reasoning - even after accounting for visual impairments. She also consistently demonstrated impairments on tasks across all executive functioning including higher order attention, response inhibition, novel problem-solving, and cognitive efficiency. Verbal learning and memory were within expected ranges.  Visual learning and memory was likely undermined by her drawing abilities as her recognition memory was perfect.  Processing speed was quite variable, ranging from the extremely low range to the average range, with most tasks in the average range. Simple attention was average. Language skills were generally in the average range.   Motor dexterity was slowed bilaterally, left moreso than right.  She reported moderate symptoms of anxiety and stress, and mild symptoms of depression.     Taken together, the results of the neuropsychological evaluation revealed both global and lateralized deficits.  Weaknesses or impairments of attention, executive functioning, and processing speed would indicate more global cognitive dysfunction.  Impairments in visuospatial functioning - particularly when compared to average language abilities - suggest right hemisphere dysfunction.  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 Pari may notice increased cognitive challenges when stressed, fatigued, in pain - accounting for some of the changes in cognition noted below. The results are not overly concerning for an ongoing neurodegenerative process - however, Pari may benefit from a follow-up evaluation in 2 years time for further diagnostic clarification as updating recommendations.     RECOMMENDATIONS:    If not already in connection with, Pari is encouraged to explore what services are available to her through low vision programs including those at [facility] or https://[place].gov/deed/ssb/  Pari is encouraged to consider psychotherapy services for herself to help with reported symptoms of depression. She is encouraged to reach out to [facility] or explore [facility] [employer] to find a psychotherapist that best fits with her.   Pari may consider cognitive strategies and interventions through SLP or OT.  She is encouraged to talk to her providers if these referrals would be appropriate.   As mentioned above, she may benefit from another neuropsychological evaluation in 24-36 months to update diagnoses and recommendations.     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   I  hope this report is helpful to all those involved.  I would be happy to discuss these results in detail or answer any other questions.  Please call the clinic at [phone] if further questions arise.    The findings and additional feedback recommendations will be provided via phone feedback and updated in a phone note.       Beth Schulz PsyD, LP  Clinical Neuropsychologist    RELEVANT BACKGROUND INFORMATION obtained 3/25/2025 via clinical interview  Informed consent  was obtained after discussing the purpose and procedures of the evaluation, as well as aspects of confidentiality and effort/engagement.  Background information was obtained from a clinical interview with Pari on 3/25/2025 as well as review of available medical records.     HISTORY OF PRESENTING ILLNESS: Pari is a 35-year-old woman with a PMH is esotropia, nystagmus, global developmental delay and schizencephaly. She has been followed by neurology for ataxia. In the past, work-up for reversible forms of ataxia were considered normal, past MRIs have not demonstrated cerebellar atrophy, an d exome sequencing was negative for genetic etiology of ataxia. She has had several falls that including falling forward and extensive bruising her right arm. She falls about 3-4 times a month. She last worked with PT about 1.5 years ago. Her mother reported that she has noticed increased memory challenges, such as forgetting where her phone is, misplacing children's clothing items, and struggling to recall appointments. Neuropsychological testing has not been completed - prompting this referral.     Clinical Interview 3/25/2025:    Pari reported that attention and memory are her two big concerns.  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  She noted she can't recall what people tell her, and she has difficulties recalling conversations.  " She also misplaces important objects such as her phone or glasses.  She does have two small kids, so it's hard for her to determine if this is \"mom brain\" or due to cognitive changes from other factors. It's harder for her to recall remote events.       Pari reported she is very much a \"homebody\" due to the combination of physical and cognitive concerns.   She noted her ataxia prevents her from navigating the community in ways that she wants to.  She noted she is getting weaker and she is concerned about the progression of her ataxia.  She feels weakness and cramping in her feet, back, and has gained a lot of weight because it's hard for her to exercise.       Pari lives at home with her  and two young kids (ages 6 and 3).  She is responsible for most things around the house including cleaning and laundry.  Her  cooks and is responsible for all the finances, but it has always been this arrangement.  She manages her own medications - although she will occasionally forget to take her medications.  She denied any difficulties with a cell phone or computer.  She doesn't drive as she is legally blind - although this also contributes to why she is a homebody. She spends her day cleaning and watching TV.  She noted her physical challenges contribute to feelings of depression and anxiety because it's hard for her to engage in some things such as it's hard for her to care for her children without her  around.      She has participated in PT in the past, when she was pregnant, with some efficacy.  She is not currently involved in PT.      HEALTH HABITS, BEHAVIORS AND MOOD:   Description of current mood:  \"For the most part, I'm hanging out at baseline.\"  She reported low energy and coping with anxiety and depression.  She tries to stay positive.  She does have therapist and has done couples counseling in the past.  She has never had a psychiatric hospitalization.  She has attended an IOP when she was " "going through some challenges with her  (2023).      Appetite: \"okay.\" No changes to taste.  Smell - Nose blind to the pets.                 Sleep/Energy: \"Same.\" Varies. She does have difficulties falling asleep due to ruminative thoughts.  Her kids also wake her up at night.  She will nap during the day, or every other day for about  minutes around 2-4pm.  She has never been diagnosed with a DENNYS, but after a recent visit - her sister mentioned that she noted and suspects DENNYS.      Chronic Pain: back pain     Tobacco/Marijuana use:  Denied recent or remote     Alcohol use:  3-4 times a week she consumes 1-3 standard drinks.      Other Drugs: Denied.     Hallucinations: Denied hallucinations delusions     Suicidal ideation: Denied SI, HI, NSSI     MEDICAL/PSYCHIATRIC/SURGICAL HISTORY: Denied other history of TBI, seizure, stroke, CNS infection, or CNS surgery  Patient Active Problem List   Diagnosis    Nonunion of fracture    Ataxia    Missed     Humerus fracture    Recurrent major depressive disorder, in full remission    Indication for care in labor or delivery    S/P     Generalized anxiety disorder    MDD (major depressive disorder), recurrent episode, moderate (H)    Spinocerebellar ataxia (H)       Past Medical History:   Diagnosis Date    Ataxia     Depressive disorder     Humerus fracture initial injury  2012    ORIF left humerus in 2012    Missed         Past Surgical History:   Procedure Laterality Date     SECTION N/A 2018    Procedure:  SECTION;  Surgeon: Geoffrey Trujillo MD;  Location:  [facility]     SECTION N/A 2022    Procedure: REPEAT  SECTION;  Surgeon: Geoffrey Trujillo MD;  Location:  [facility]    DENTAL SURGERY      wisdom extraction     EYE SURGERY      muscle repair for wandering eye--bilat    OPEN REDUCTION INTERNAL FIXATION ELBOW  2012    Procedure...:OPEN REDUCTION INTERNAL FIXATION ELBOW; REPAIR " "OF NON-UNION, LEFT ELBOW ; Surgeon:BREE NAVARRETE; Location: OR    OPEN REDUCTION INTERNAL FIXATION HUMERUS DISTAL  1/28/2012    Procedure:OPEN REDUCTION INTERNAL FIXATION HUMERUS DISTAL; ORIF LEFT DISTAL HUMERUS FRACTURE. 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  Absence of the ventricular septum is noted.  Corpus callosum is without gross abnormality.\"     Brain Imaging 2-: \"Right hemisphere schizencephaly\"    MEDICATIONS: Rosemary has a current medication list which includes the following prescription(s): buspirone, citalopram, clobetasol, compounded non-controlled substance, dalfampridine, levonorgestrel, tacrolimus, and tirzepatide-weight management..    SOCIAL/DEVELOPMENTAL/OCCUPATIONAL HISTORY:  Pari was born in [facility], and moved to [place] at a young age. She reported she was slow to learn how to walk.  She stated she did \"okay\" in school - she technically met criteria for a learning disability given her difficulties in vision.  As a result, and had an IEP and had extra time for tests, enlarged texts.  She was an \"average\" student, earning primarily Bs/Cs.  However, she did report difficulties with learning, particularly for math, even after accounting for vision difficulties.She graduated high school on time.  She took a couple of general education requirements at the college level.  For fun, she likes to be involved in her children's PTA and involved in their school.     BEHAVIORAL OBSERVATIONS: Pari arrived to her " appointment, on time and accompanied by her mother and daughter.  She walked with a slowed, shuffled gait and a cane, but otherwise no gross or fine motor impairments were noted in casual observation. Speech was slightly dysarthric/effortful, with some stuttering noted, but this did not greatly effect functional communication. Thoughts were goal-directed and linear.  Affect was consistent with euthymic mood.  Pari was friendly, cooperative, and completely appropriate throughout the lengthy session. No SI, HI, umu, or confusion noted.  She did struggle with some of the motor tasks, specifically holding a pencil and drawing, and as such all drawings were scored liberally because of this. She appeared engaged and attending to the testing.     TEST RESULTS: Please use the following table for reference.   Percentile Ranks Descriptor   <2nd Percentile Extremely Low Range   3rd - 9th Percentile Borderline Range   10th - 16th Percentile Below Average Range   17th - 24th Percentile Low Average   25th - 75th Percentile Average   76th - 90th Percentile Above Average   91st - 97th Percentile Superior   >98th Percentile Very Superior      Performance Validity:  Pari performed within expected ranges on embedded and standalone measures of performance validity.  Taken together with the behavioral observations above, the following results are considered an accurate reflection of her current neuropsychological status.     Pre-Morbid Functioning: Pari performed in the borderline range on a task of single-word reading, equivalent to a 5.7 grade reading level.  Taken together with demographic information, it is estimated that Pari's pre-morbid functioning is within borderline to average ranges.     General Intellectual Functioning: Pari performed in the borderline range on a composite measure of general intellectual functioning (WASI-II FSIQ-4 sliding scale = 72, 3%ile), however, she performed better on verbally-mediated tasks (below  average range) versus her visually-based tasks (extremely low range), as such the FSIQ is unlikely to be an accurate representation of her neuropsychological functioning and domains must be considered individually.     Language Skills: Expressive and receptive language was functional with no concerns noted in observation.  General fund of vocabulary was average.  Verbal abstraction was below average.     Visuospatial: Judgment of line orientation was in the borderline range. Block construction was extremely low.  Pattern recognition and reasoning was extremely low. Copy of a complex figure was extremely low.  Her copy revealed extremely poor visuospatial functioning likely undermined by a piece-meal approach and poor planning.     Attention/Working Memory/Processing Speed: Simple attention and working memory was in the below average range.  Processing speed ranged from the exceptionally low to average range, with most scores in the extremely low to borderline range.  She performed in the average range on tasks of target identification. Processing speeds tasks in the borderline range included rapid sequencing and rapid word reading.  She performed in the extremely low range on tasks of sustained attention and vigilance, speeded working memory tasks, rapid alternating attention, and rapid color naming.     Learning/Memory: New learning for a list of words was in the average range, but characterized by a plateaued learning curve.  Delayed recall was in the average range, and she was generally able to recall all that she learned. Recognition memory was above average, with all targets hits and no false positive errors.  Visual learning was in the extremely low range. She was able to recall more than what she learned initially, however, this was still in the extremely low range. Recognition memory was normal, with all hits and no false positive errors.  Incidental learning and memory was in the extremely low range.  "    Executive Functioning: Two tasks of rapid alternating attention were in the extremely low range, with a large number of errors.  Response inhibition was slowed, with a speed score in the extremely low range, and number of errors in the borderline range. Novel problem-solving was in the borderline range.  She made a large number of errors, many of which were perseverative errors. Combined with a low number of conceptual level of responses, this suggests that Pari used a haphazard approach and tended to \"get stuck\" when her approach was no longer working.  She also made a high number of errors in failing to maintain mental set, suggesting her attention was prone to distractions.     Motor: Dominant hand (right hand) was in the extremely low range for fine motor dexterity.  The grooved pegs trial with her left hand was discontinued as she only had gotten 2 pegs within 60 seconds    Psychological Functioning:     Depression, Anxiety, and Stress Scale (CECIL)   Domain Score Interpretation   Depression 12  Mild    Anxiety  10  Moderate   Stress  19  Moderate       Procedures Administered by Psychologist: Clinical Interview with Rosemary 3/5/2025, review of available medical records, test selection, interpretation, preparation of written report, and feedback. Please see nursing note for procedures administered by psychometrist.      Tests Administered:  [facility] Test List: NAB Orientation and Numbers and Letters, WRAT Word Reading, Dot Counting Test, WAIS-IV Tests: Digit Span, Symbol Search, and Coding, HVLT-R, BVMT-R, RCFT: Copy and 3' Delay, Line Orientation, Trails A&B, Color-Word Interference, WCST-128, CECIL-42, Grooved Pegboard, and WASI-II:  Full 4 subtests      Activities included in this evaluation CPT Code Time Units   Psychological Diagnostic Interview 75294 -    Neuropsychology Professional Time [id number] 1   Add on 13863  2   Neuropsychologist Admin/Scoring Tests [id number]     Add On 22439     Psychometrician " Time - Test 47024 241 1   Admin/Scoring [id number]  7   DX:

## 2025-05-28 ASSESSMENT — PATIENT HEALTH QUESTIONNAIRE - PHQ9
SUM OF ALL RESPONSES TO PHQ QUESTIONS 1-9: 4
SUM OF ALL RESPONSES TO PHQ QUESTIONS 1-9: 4
10. IF YOU CHECKED OFF ANY PROBLEMS, HOW DIFFICULT HAVE THESE PROBLEMS MADE IT FOR YOU TO DO YOUR WORK, TAKE CARE OF THINGS AT HOME, OR GET ALONG WITH OTHER PEOPLE: SOMEWHAT DIFFICULT

## 2025-05-29 ENCOUNTER — OFFICE VISIT (OUTPATIENT)
Dept: FAMILY MEDICINE | Facility: CLINIC | Age: 35
End: 2025-05-29

## 2025-05-29 VITALS
BODY MASS INDEX: 36.84 KG/M2 | HEART RATE: 85 BPM | OXYGEN SATURATION: 94 % | WEIGHT: 195 LBS | DIASTOLIC BLOOD PRESSURE: 63 MMHG | SYSTOLIC BLOOD PRESSURE: 116 MMHG

## 2025-05-29 DIAGNOSIS — E66.812 CLASS 2 OBESITY DUE TO EXCESS CALORIES WITHOUT SERIOUS COMORBIDITY WITH BODY MASS INDEX (BMI) OF 36.0 TO 36.9 IN ADULT: Primary | ICD-10-CM

## 2025-05-29 DIAGNOSIS — G11.8 SPINOCEREBELLAR ATAXIA (H): ICD-10-CM

## 2025-05-29 DIAGNOSIS — E78.5 DYSLIPIDEMIA: ICD-10-CM

## 2025-05-29 DIAGNOSIS — E66.09 CLASS 2 OBESITY DUE TO EXCESS CALORIES WITHOUT SERIOUS COMORBIDITY WITH BODY MASS INDEX (BMI) OF 36.0 TO 36.9 IN ADULT: Primary | ICD-10-CM

## 2025-05-29 NOTE — PROGRESS NOTES
Provider: AL      Tech: DAYANARA      Patient Name: Pari Davis     : 1990      MRN: [id number]     PABON: 2025      Age: 35      Education: 13      Ethnicity: L      Handedness: Right      Station: OP             NEUROPSYCHOLOGICAL TESTS RAW SCORE STANDARDIZED SCORE* PERCENTILE   WAIS-IV Digit Span       RDS 8 --     DCT         E-Score 14             NAB Orientation        Total Score 29 --            Wide Range Achievement Test (WRAT-5, Blue)            Word Reading 47 SS= 76 5%     G.E.= 5.7           Wechsler Adult Intelligence Scale-IV (WAIS-IV)       Digit Span 22 ScS= 7 16%   Symbol Search 27 ScS= 8 25%   Coding 41 ScS= 5 5%   Wechsler Abbreviated Scale of Intelligence-II (WASI-II)            Full-Scale IQ-4 (FSIQ)  SS= 72 3%        Full-Scale IQ-2 (FSIQ)  SS= 75 5%        Verbal Comprehension Index (VCI)  SS= 86 18%        Perceptual Reasoning Index (SKYLA)  SS= 62 1%   Vocabulary 34 T= 44 27%   Similarities 25 T= 39 14%   Block Design 11 T= 28 1%   Matrix Reasoning 8 T= 27 1%          Trail Making Test (Time/Errors)       Part A 62/0 T= 23 <1%   Part B 297/7 T= 7    Neuropsychological Assessment Battery (NAB) Numbers and Letters (Form 1)     Numbers & Letters Part A Speed 435 T= 19 <1%   Numbers & Letters Part A Errors 52 T= 19 <1%   Numbers & Letters Part A Efficiency 42.3 T= 19 <1%   Numbers & Letters Part B Efficiency 0 T= 19 <1%   Numbers & Letters Part C Efficiency 5.65 T= 28 1%   Numbers & Letters Part D Efficiency 0.65 T= 19 <1%   Numbers & Letters Part D Disruption 12.36 T= 19 <1%   Bijal-Blue Executive Function System (D-KEFS) Color-Word Interference Test     Color Naming 42 ScS= 3 1%   Word Reading 30 ScS= 5 5%   Inhibition 81 ScS= 3 1%   Inhibition Total Errors 5 ScS= 4 2%   Inhibition/Switching 86 ScS= 4 2%   Inhibition/Switching Total Errors 6 ScS= 6 9%   [place] Card Sorting Test (WCST-128; E)       Categories Completed 4 --  6-10%   Trials to Complete 1st Category 11 --  >16%   Total  Errors 39 T= 38 12   Perseverative Responses 26 T= 34 5   Failure To Maintain Set 5 --  <1%   % Conceptual Level Responses 62% T= 40 0.16   Learning to Learn -4.99 --  6-10%          Jules Verbal Learning Test - Revised (HVLT-R, Form 1)       Trial 1 8 -     Trial 2 10 -     Trial 3 10 -     Total Trials 1-3 28 T= 50 50%   Delayed Recall 9 T= 45 31%   Recognition Hits 12 -     Recognition False Alarms 0 -     Recognition Discriminability 12 T= 58 79%   Brief Visual Memory Test - Revised (BVMT-R, Form 1)       Trial 1 2 --     Trial 2 4 --     Trial 3 3 --     Trials 1-3 9 T= <20    Learning 2 T= 41 18%   Delayed Recall 6 T= 28 1%   Percent Retained 150% --  >16%   Recognition Hits 6 --  >16%   Recognition False Alarms 0 --  >16%   Discrimination Index 6 --  >16%   Copy Trial 10 --     Huber Complex Figure Test (RCFT)       Time to Copy 285 --  11-16%   Copy 19 --  <1   Immediate Recall 7 T= <20    Delayed Recall <1 T=            Repeatable Battery for the Assessment of Neuropsychological Status (RBANS-)     Line Orientation 11 --  3-9%          Grooved Pegboard Test       RH (Time/Drops) 177/8 T= 2    LH (Time/Drops) DC @ 60- 2 pegs in/3 T=            Depression, Anxiety, Stress Scale (CECIL-42)       Depression 12      Anxiety 10      Stress 19      Abstract       WNL = within normal limits. DC = discontinued due to patient s inability to complete the test. (E) = Utilized age and education-corrected norms.   *Standardized scores: T= T-score; mean = 50, standard deviation =10; Z= z-score; mean = 0, standard deviation = 1; ScS = scaled score; mean = 10, standard deviation = 3; MAS = [place] Older Adult Normative Study age adjusted scaled score; mean = 10, standard deviation = 3; SS = standard score; mean = 100, standard deviation = 15.   **Descriptive ranges are based on American Academy of Clinical Neuropsychology (2020) consensus guidelines, or test manuals where appropriate.

## 2025-05-29 NOTE — PROGRESS NOTES
PRIMARY CARE WEIGHT MANAGEMENT INITIAL NOTE    CHIEF COMPLAINT:  Rosemary Davis is a 35 year old female who is following up for medical weight management.     Always small then when turned 19 started gaining weight. Stopped as much exercise. Later couldn't do as much and diagnosed with Ataxia. Tried to do what she could. Tried everything and with balance issues had trouble with exercising. Tried other programs without success. Best results Ghada with meal replacements. Then got pregnant. Dad is on medication-Wegovy and lost weight. Wants to be healthy and lose weight. Wants more energy back.    Medications:   Taking Zepbound in vials via [employer] direct. Dose 2.5 mg weekly. Feels that it is working. No side effects from medication. Took 4 dose with last injection    Nutrition:  Focusing on protein and fiber.  Staying hydrated  Minimal alcohol    Behavior:   Typical day doesn't eat too much. Not breakfast eater then lunch early. Big dinner. Cut out chips. Doesn't graze eat like her . Working on drinking more water.     Physical Activity:   Minimal right now. Doing body mind with legs and arms. Hoping when nicer to get outside to walk. Walking will be main source. Getting better with up to 2 days a week. Difficult to lift weights. Using a cane for ambulation.     WEIGHT HISTORY:   Wt Readings from Last 3 Encounters:   05/29/25 88.5 kg (195 lb)   04/29/25 89.9 kg (198 lb 4.8 oz)   04/04/25 92.9 kg (204 lb 12.8 oz)       PHYSICAL EXAM:  VITAL SIGNS:  /63   Pulse 85   Wt 88.5 kg (195 lb)   SpO2 94%   BMI 36.84 kg/m      Weight at start of treatment: 204 lbs  Weigh at last appointment: 198 lbs  Weight today: 195 lbs  Weight change since last appointment: -3 lbs  Weight change since start of treatment : -9 bs  Goal weight loss over first 6 months: 10% of body weight     General: no acute distress, cooperative with exam.   Psych: mental status normal, mood and affect  appropriate.        ASSESSMENT/PLAN:  Rosemary Davis is a 35 year old female who is following up for medical weight management.    Class 2 obesity due to excess calories without serious comorbidity with body mass index (BMI) of 36.0 to 36.9 in adult    -Nutrition: Focusing on protein with increase in hydration. Following with nutrition: No.   -Exercise: Goal: Walking 3x/week   -Medications: Taking Zepbound 2.5 mg weekly via [employer] direct will increase Zepbound 5 mg weekly. Education about adverse effects of prescription medication discussed. Follow up in 4 weeks or sooner as needed.   - tirzepatide-weight management (ZEPBOUND) 5         MG/0.5ML vial    Spinocerebellar ataxia (H)   Contributing to obesity. Difficulty with balance. Taking Dalfampridine. Follows with ataxia center. Currently managed by Neurology. Uses cane for ambulation. Continue current medication regimen and treatment plan.     Dyslipidemia  Not on medication. Will treat in part with MWM. Reviewed lifestyle modifications.        Follow up:  4 weeks mwm     The longitudinal plan of care for the diagnosis(es)/condition(s) as documented were addressed during this visit. Due to the added complexity in care, I will continue to support Pari in the subsequent management and with ongoing continuity of care.      Answers submitted by the patient for this visit:  Patient Health Questionnaire (Submitted on 5/28/2025)  If you checked off any problems, how difficult have these problems made it for you to do your work, take care of things at home, or get along with other people?: Somewhat difficult  PHQ9 TOTAL SCORE: 4  General Questionnaire (Submitted on 5/28/2025)  Chief Complaint: Chronic problems general questions HPI Form  What is the reason for your visit today? : Weight management  How many days per week do you miss taking your medication?: 0  Questionnaire about: Chronic problems general questions HPI Form (Submitted on 5/28/2025)  Chief  Complaint: Chronic problems general questions HPI Form

## 2025-05-29 NOTE — PATIENT INSTRUCTIONS
Approximate Comparative doses of GLP1s and GLP1/GIPs (DM=Diabetes indication, WL=Weight loss indication)     Liraglutide   (Victoza- DM)   (Saxenda-WL) Daily 0.6mg 1.2mg 1.8mg                  Exenatide   (Bydreon-DM) Weekly     2mg                  Dulaglutide   (Trulicity-DM) Weekly   0.75mg 1.5mg 3mg 4.5mg              Oral Semaglutide  (Rybelsus -DM) Daily 3mg 7mg 14mg                  Semaglutide  (Ozempic- DM) Weekly   0.25mg 0.5mg   1mg   2mg          Semaglutide  (Compounded) Weekly  0.25mg 0.5mg  1mg 1.5mg 2mg 2.5mg      Semaglutide   (Wegovy-WL) Weekly   0.25mg 0.5mg   1mg 1.7mg  2.4mg         Tirzeptatide  (Mounjaro-DM)  (Zepbound-WL) Weekly     2.5mg       5mg 7.5mg 10mg 12.5mg 15mg

## 2025-06-16 DIAGNOSIS — E66.812 CLASS 2 OBESITY DUE TO EXCESS CALORIES WITHOUT SERIOUS COMORBIDITY WITH BODY MASS INDEX (BMI) OF 36.0 TO 36.9 IN ADULT: ICD-10-CM

## 2025-06-16 DIAGNOSIS — E66.09 CLASS 2 OBESITY DUE TO EXCESS CALORIES WITHOUT SERIOUS COMORBIDITY WITH BODY MASS INDEX (BMI) OF 36.0 TO 36.9 IN ADULT: ICD-10-CM

## 2025-06-17 RX ORDER — TIRZEPATIDE 5 MG/.5ML
INJECTION, SOLUTION SUBCUTANEOUS
Qty: 2 ML | Refills: 0 | Status: SHIPPED | OUTPATIENT
Start: 2025-06-17

## 2025-06-30 ASSESSMENT — PATIENT HEALTH QUESTIONNAIRE - PHQ9
SUM OF ALL RESPONSES TO PHQ QUESTIONS 1-9: 4
10. IF YOU CHECKED OFF ANY PROBLEMS, HOW DIFFICULT HAVE THESE PROBLEMS MADE IT FOR YOU TO DO YOUR WORK, TAKE CARE OF THINGS AT HOME, OR GET ALONG WITH OTHER PEOPLE: SOMEWHAT DIFFICULT
SUM OF ALL RESPONSES TO PHQ QUESTIONS 1-9: 4

## 2025-07-01 ENCOUNTER — OFFICE VISIT (OUTPATIENT)
Dept: FAMILY MEDICINE | Facility: CLINIC | Age: 35
End: 2025-07-01

## 2025-07-01 VITALS
DIASTOLIC BLOOD PRESSURE: 61 MMHG | BODY MASS INDEX: 36.16 KG/M2 | SYSTOLIC BLOOD PRESSURE: 119 MMHG | HEART RATE: 96 BPM | WEIGHT: 191.4 LBS | OXYGEN SATURATION: 99 %

## 2025-07-01 DIAGNOSIS — F41.1 GENERALIZED ANXIETY DISORDER: ICD-10-CM

## 2025-07-01 DIAGNOSIS — G11.8 SPINOCEREBELLAR ATAXIA (H): ICD-10-CM

## 2025-07-01 DIAGNOSIS — F33.1 MAJOR DEPRESSIVE DISORDER, RECURRENT EPISODE, MODERATE (H): ICD-10-CM

## 2025-07-01 DIAGNOSIS — E66.812 CLASS 2 OBESITY DUE TO EXCESS CALORIES WITHOUT SERIOUS COMORBIDITY WITH BODY MASS INDEX (BMI) OF 36.0 TO 36.9 IN ADULT: Primary | ICD-10-CM

## 2025-07-01 DIAGNOSIS — E66.09 CLASS 2 OBESITY DUE TO EXCESS CALORIES WITHOUT SERIOUS COMORBIDITY WITH BODY MASS INDEX (BMI) OF 36.0 TO 36.9 IN ADULT: Primary | ICD-10-CM

## 2025-07-01 DIAGNOSIS — E78.5 DYSLIPIDEMIA: ICD-10-CM

## 2025-07-01 PROCEDURE — G2211 COMPLEX E/M VISIT ADD ON: HCPCS

## 2025-07-01 PROCEDURE — 3074F SYST BP LT 130 MM HG: CPT

## 2025-07-01 PROCEDURE — 3078F DIAST BP <80 MM HG: CPT

## 2025-07-01 PROCEDURE — 99214 OFFICE O/P EST MOD 30 MIN: CPT

## 2025-07-01 NOTE — TELEPHONE ENCOUNTER
Med: Buspar    LOV (related): 3/21/25      Due for F/U around: 7/2025 MWM    Next Appt: 7/1/25 4/28/2025     3:57 PM 5/28/2025     9:53 AM 6/30/2025    11:01 AM   PHQ   PHQ-9 Total Score 4  4  4    Q9: Thoughts of better off dead/self-harm past 2 weeks Not at all Not at all Not at all       Patient-reported           7/27/2023     8:11 AM 8/14/2023     9:16 AM 12/18/2023     9:26 AM   KARMEN-7 SCORE   Total Score  5 (mild anxiety)    Total Score 7 5 2

## 2025-07-01 NOTE — PROGRESS NOTES
"PRIMARY CARE WEIGHT MANAGEMENT INITIAL NOTE    CHIEF COMPLAINT:  Rosemary Davis is a 35 year old female who is following up for medical weight management.     Always small then when turned 19 started gaining weight. Stopped as much exercise. Later couldn't do as much and diagnosed with Ataxia. Tried to do what she could. Tried everything and with balance issues had trouble with exercising. Tried other programs without success. Best results Ghada with meal replacements. Then got pregnant. Dad is on medication-Wegovy and lost weight. Wants to be healthy and lose weight. Wants more energy back.    Medications:   Taking Zepbound in vials via [employer] direct. Dose 5 mg weekly. Feels that it is working. No side effects from medication. Completed 4 dose with 5 mg. Injection days: Fridays. Mom has noticed in her face. Still little energy. One \"kick\" to the stomach feeling one day.     Nutrition:  Diet is going well. Focusing on protein and fiber. Staying hydrated. Minimal alcohol    Behavior:   Typical day doesn't eat too much. Not breakfast eater then lunch early. Not as hungry for dinner. Cut out chips. Doesn't graze eat like her . Working on drinking more water.     Physical Activity:   Has increased. Doing body mind with legs and arms. Trying to get out of the house with summer here. Walking will be main source. Getting better with up to 2 days a week. Difficult to lift weights. Using a cane for ambulation.     WEIGHT HISTORY:   Wt Readings from Last 3 Encounters:   07/01/25 86.8 kg (191 lb 6.4 oz)   05/29/25 88.5 kg (195 lb)   04/29/25 89.9 kg (198 lb 4.8 oz)       PHYSICAL EXAM:  VITAL SIGNS:  /61   Pulse 96   Wt 86.8 kg (191 lb 6.4 oz)   SpO2 99%   BMI 36.16 kg/m      Weight at start of treatment: 204 lbs  Weigh at last appointment: 195 lbs  Weight today: 191 lbs  Weight change since last appointment: -4 lbs  Weight change since start of treatment : -13 bs  Goal weight loss over first 6 months: " 10% of body weight     General: no acute distress, cooperative with exam.   Psych: mental status normal, mood and affect appropriate.      ASSESSMENT/PLAN:  Rosemary Davis is a 35 year old female who is following up for medical weight management.    Class 2 obesity due to excess calories without serious comorbidity with body mass index (BMI) of 36.0 to 36.9 in adult    -Nutrition: Focusing on protein with increase in hydration. Following with nutrition: No.   -Exercise: Goal: Walking 3x/week   -Medications: Taking Zepbound 5 mg weekly via [employer] direct will increase Zepbound 7.5 mg weekly. Education about adverse effects of prescription medication discussed. Follow up in 4 weeks or sooner as needed. Body comp next visit.   - tirzepatide-weight management (ZEPBOUND) 7.5         MG/0.5ML vial    Spinocerebellar ataxia (H)   Contributing to obesity. Difficulty with balance. Taking Dalfampridine. Follows with ataxia center. Currently managed by Neurology. Uses cane for ambulation. Continue current medication regimen and treatment plan.     Dyslipidemia  Not on medication. Will treat in part with MWM. Reviewed lifestyle modifications.      Follow up:  4 weeks mwm     The longitudinal plan of care for the diagnosis(es)/condition(s) as documented were addressed during this visit. Due to the added complexity in care, I will continue to support Pari in the subsequent management and with ongoing continuity of care.    Answers submitted by the patient for this visit:  Patient Health Questionnaire (Submitted on 6/30/2025)  If you checked off any problems, how difficult have these problems made it for you to do your work, take care of things at home, or get along with other people?: Somewhat difficult  PHQ9 TOTAL SCORE: 4  General Questionnaire (Submitted on 6/30/2025)  Chief Complaint: Chronic problems general questions HPI Form  What is the reason for your visit today? : Weight management  How many days per  week do you miss taking your medication?: 0  Questionnaire about: Chronic problems general questions HPI Form (Submitted on 6/30/2025)  Chief Complaint: Chronic problems general questions HPI Form

## 2025-07-02 RX ORDER — BUSPIRONE HYDROCHLORIDE 10 MG/1
10 TABLET ORAL 2 TIMES DAILY
Qty: 180 TABLET | Refills: 2 | Status: SHIPPED | OUTPATIENT
Start: 2025-07-02

## 2025-08-04 ASSESSMENT — PATIENT HEALTH QUESTIONNAIRE - PHQ9
SUM OF ALL RESPONSES TO PHQ QUESTIONS 1-9: 2
SUM OF ALL RESPONSES TO PHQ QUESTIONS 1-9: 2
10. IF YOU CHECKED OFF ANY PROBLEMS, HOW DIFFICULT HAVE THESE PROBLEMS MADE IT FOR YOU TO DO YOUR WORK, TAKE CARE OF THINGS AT HOME, OR GET ALONG WITH OTHER PEOPLE: SOMEWHAT DIFFICULT

## 2025-08-05 ENCOUNTER — OFFICE VISIT (OUTPATIENT)
Dept: FAMILY MEDICINE | Facility: CLINIC | Age: 35
End: 2025-08-05

## 2025-08-05 VITALS
HEART RATE: 96 BPM | OXYGEN SATURATION: 97 % | WEIGHT: 188.8 LBS | BODY MASS INDEX: 35.67 KG/M2 | SYSTOLIC BLOOD PRESSURE: 107 MMHG | DIASTOLIC BLOOD PRESSURE: 50 MMHG

## 2025-08-05 DIAGNOSIS — G11.8 SPINOCEREBELLAR ATAXIA (H): ICD-10-CM

## 2025-08-05 DIAGNOSIS — E78.5 DYSLIPIDEMIA: ICD-10-CM

## 2025-08-05 DIAGNOSIS — E66.09 CLASS 2 OBESITY DUE TO EXCESS CALORIES WITHOUT SERIOUS COMORBIDITY WITH BODY MASS INDEX (BMI) OF 35.0 TO 35.9 IN ADULT: Primary | ICD-10-CM

## 2025-08-05 DIAGNOSIS — E66.812 CLASS 2 OBESITY DUE TO EXCESS CALORIES WITHOUT SERIOUS COMORBIDITY WITH BODY MASS INDEX (BMI) OF 35.0 TO 35.9 IN ADULT: Primary | ICD-10-CM

## 2025-08-05 PROCEDURE — 3078F DIAST BP <80 MM HG: CPT

## 2025-08-05 PROCEDURE — 3074F SYST BP LT 130 MM HG: CPT

## 2025-08-05 PROCEDURE — G2211 COMPLEX E/M VISIT ADD ON: HCPCS

## 2025-08-05 PROCEDURE — 99214 OFFICE O/P EST MOD 30 MIN: CPT

## 2025-08-05 RX ORDER — TIRZEPATIDE 7.5 MG/.5ML
7.5 INJECTION, SOLUTION SUBCUTANEOUS WEEKLY
Qty: 2 ML | Refills: 1 | Status: SHIPPED | OUTPATIENT
Start: 2025-08-05

## (undated) DEVICE — CATH TRAY FOLEY 16FR BARDEX W/DRAIN BAG STATLOCK 300316A

## (undated) DEVICE — SU DERMABOND ADVANCED .7ML DNX12

## (undated) DEVICE — LINEN C-SECTION 5437

## (undated) DEVICE — SUCTION CANISTER MEDIVAC LINER 3000ML W/LID 65651-530

## (undated) DEVICE — SU VICRYL 3-0 CT-1 36" J338H

## (undated) DEVICE — SU VICRYL 0 CT-1 27" J340H

## (undated) DEVICE — SOL NACL 0.9% IRRIG 1000ML BOTTLE 07138-09

## (undated) DEVICE — GLOVE PROTEXIS W/NEU-THERA 7.0  2D73TE70

## (undated) DEVICE — ESU GROUND PAD UNIVERSAL W/O CORD

## (undated) DEVICE — SU MONOCRYL 3-0 SH 27" UND Y416H

## (undated) DEVICE — SU MONOCRYL 0 CTX 36" Y398H

## (undated) DEVICE — SOL WATER IRRIG 1000ML BOTTLE 07139-09

## (undated) DEVICE — BLADE CLIPPER 4406

## (undated) DEVICE — PACK C-SECTION LF PL15OTA83B

## (undated) DEVICE — GLOVE ORTHO 6.5 LATEX

## (undated) DEVICE — PREP CHLORAPREP 26ML TINTED ORANGE  260815

## (undated) DEVICE — LINEN C-SECTION 5415

## (undated) RX ORDER — ONDANSETRON 2 MG/ML
INJECTION INTRAMUSCULAR; INTRAVENOUS
Status: DISPENSED
Start: 2022-04-30

## (undated) RX ORDER — KETOROLAC TROMETHAMINE 30 MG/ML
INJECTION, SOLUTION INTRAMUSCULAR; INTRAVENOUS
Status: DISPENSED
Start: 2022-04-30

## (undated) RX ORDER — MORPHINE SULFATE 1 MG/ML
INJECTION, SOLUTION EPIDURAL; INTRATHECAL; INTRAVENOUS
Status: DISPENSED
Start: 2022-04-30

## (undated) RX ORDER — OXYTOCIN/0.9 % SODIUM CHLORIDE 30/500 ML
PLASTIC BAG, INJECTION (ML) INTRAVENOUS
Status: DISPENSED
Start: 2022-04-30